# Patient Record
Sex: MALE | Race: WHITE | NOT HISPANIC OR LATINO | Employment: OTHER | URBAN - METROPOLITAN AREA
[De-identification: names, ages, dates, MRNs, and addresses within clinical notes are randomized per-mention and may not be internally consistent; named-entity substitution may affect disease eponyms.]

---

## 2019-08-16 ENCOUNTER — HISTORICAL (OUTPATIENT)
Dept: RADIOLOGY | Facility: HOSPITAL | Age: 64
End: 2019-08-16

## 2022-08-30 ENCOUNTER — HOSPITAL ENCOUNTER (INPATIENT)
Facility: HOSPITAL | Age: 67
LOS: 10 days | Discharge: HOME HEALTH | DRG: 231 | End: 2022-09-10
Attending: INTERNAL MEDICINE | Admitting: INTERNAL MEDICINE
Payer: MEDICARE

## 2022-08-30 ENCOUNTER — HOSPITAL ENCOUNTER (EMERGENCY)
Facility: HOSPITAL | Age: 67
Discharge: ANOTHER HOSPITAL | End: 2022-08-30
Attending: EMERGENCY MEDICINE
Payer: MEDICARE

## 2022-08-30 VITALS
SYSTOLIC BLOOD PRESSURE: 135 MMHG | WEIGHT: 150 LBS | DIASTOLIC BLOOD PRESSURE: 73 MMHG | RESPIRATION RATE: 15 BRPM | HEIGHT: 64 IN | HEART RATE: 83 BPM | TEMPERATURE: 98 F | OXYGEN SATURATION: 96 % | BODY MASS INDEX: 25.61 KG/M2

## 2022-08-30 DIAGNOSIS — I25.10 CORONARY ARTERY DISEASE: ICD-10-CM

## 2022-08-30 DIAGNOSIS — I21.4 TYPE 1 NON-ST ELEVATION MYOCARDIAL INFARCTION (NSTEMI): ICD-10-CM

## 2022-08-30 DIAGNOSIS — U07.1 COVID: ICD-10-CM

## 2022-08-30 DIAGNOSIS — R07.9 CHEST PAIN: ICD-10-CM

## 2022-08-30 DIAGNOSIS — I25.10 CAD (CORONARY ARTERY DISEASE): ICD-10-CM

## 2022-08-30 DIAGNOSIS — I20.89 STABLE ANGINA PECTORIS: Primary | ICD-10-CM

## 2022-08-30 DIAGNOSIS — I21.9 MI (MYOCARDIAL INFARCTION): ICD-10-CM

## 2022-08-30 DIAGNOSIS — I21.4 NSTEMI (NON-ST ELEVATED MYOCARDIAL INFARCTION): ICD-10-CM

## 2022-08-30 DIAGNOSIS — I21.4 NSTEMI, INITIAL EPISODE OF CARE: Primary | ICD-10-CM

## 2022-08-30 LAB
ALBUMIN SERPL-MCNC: 3.7 GM/DL (ref 3.4–4.8)
ALBUMIN/GLOB SERPL: 1.4 RATIO (ref 1.1–2)
ALP SERPL-CCNC: 51 UNIT/L (ref 40–150)
ALT SERPL-CCNC: 141 UNIT/L (ref 0–55)
AMPHET UR QL SCN: NEGATIVE
APPEARANCE UR: CLEAR
AST SERPL-CCNC: 112 UNIT/L (ref 5–34)
BACTERIA #/AREA URNS AUTO: ABNORMAL /HPF
BARBITURATE SCN PRESENT UR: NEGATIVE
BASOPHILS # BLD AUTO: 0.04 X10(3)/MCL (ref 0–0.2)
BASOPHILS NFR BLD AUTO: 0.6 %
BENZODIAZ UR QL SCN: NEGATIVE
BILIRUB UR QL STRIP.AUTO: NEGATIVE MG/DL
BILIRUBIN DIRECT+TOT PNL SERPL-MCNC: 0.4 MG/DL
BUN SERPL-MCNC: 13 MG/DL (ref 8.4–25.7)
CALCIUM SERPL-MCNC: 9.3 MG/DL (ref 8.8–10)
CANNABINOIDS UR QL SCN: POSITIVE
CHLORIDE SERPL-SCNC: 110 MMOL/L (ref 98–107)
CO2 SERPL-SCNC: 23 MMOL/L (ref 23–31)
COCAINE UR QL SCN: NEGATIVE
COLOR UR AUTO: YELLOW
CREAT SERPL-MCNC: 0.78 MG/DL (ref 0.73–1.18)
D DIMER PPP IA.FEU-MCNC: 0.44 UG/ML FEU (ref 0–0.5)
EOSINOPHIL # BLD AUTO: 0.07 X10(3)/MCL (ref 0–0.9)
EOSINOPHIL NFR BLD AUTO: 1 %
ERYTHROCYTE [DISTWIDTH] IN BLOOD BY AUTOMATED COUNT: 13.6 % (ref 11.5–17)
FENTANYL UR QL SCN: NEGATIVE
FLUAV AG UPPER RESP QL IA.RAPID: NOT DETECTED
FLUBV AG UPPER RESP QL IA.RAPID: NOT DETECTED
GFR SERPLBLD CREATININE-BSD FMLA CKD-EPI: >60 MLS/MIN/1.73/M2
GLOBULIN SER-MCNC: 2.6 GM/DL (ref 2.4–3.5)
GLUCOSE SERPL-MCNC: 111 MG/DL (ref 82–115)
GLUCOSE UR QL STRIP.AUTO: NEGATIVE MG/DL
HCT VFR BLD AUTO: 36.6 % (ref 42–52)
HGB BLD-MCNC: 11.8 GM/DL (ref 14–18)
IMM GRANULOCYTES # BLD AUTO: 0.02 X10(3)/MCL (ref 0–0.04)
IMM GRANULOCYTES NFR BLD AUTO: 0.3 %
KETONES UR QL STRIP.AUTO: NEGATIVE MG/DL
LEUKOCYTE ESTERASE UR QL STRIP.AUTO: NEGATIVE UNIT/L
LIPASE SERPL-CCNC: 125 U/L
LYMPHOCYTES # BLD AUTO: 1.14 X10(3)/MCL (ref 0.6–4.6)
LYMPHOCYTES NFR BLD AUTO: 15.9 %
MCH RBC QN AUTO: 29.2 PG (ref 27–31)
MCHC RBC AUTO-ENTMCNC: 32.2 MG/DL (ref 33–36)
MCV RBC AUTO: 90.6 FL (ref 80–94)
MDMA UR QL SCN: NEGATIVE
MONOCYTES # BLD AUTO: 0.66 X10(3)/MCL (ref 0.1–1.3)
MONOCYTES NFR BLD AUTO: 9.2 %
MUCOUS THREADS URNS QL MICRO: ABNORMAL /LPF
NEUTROPHILS # BLD AUTO: 5.2 X10(3)/MCL (ref 2.1–9.2)
NEUTROPHILS NFR BLD AUTO: 73 %
NITRITE UR QL STRIP.AUTO: NEGATIVE
OPIATES UR QL SCN: NEGATIVE
PCP UR QL: NEGATIVE
PH UR STRIP.AUTO: 5.5 [PH]
PH UR: 5.5 [PH] (ref 3–11)
PLATELET # BLD AUTO: 291 X10(3)/MCL (ref 130–400)
PMV BLD AUTO: 10.5 FL (ref 7.4–10.4)
POTASSIUM SERPL-SCNC: 4.4 MMOL/L (ref 3.5–5.1)
PROT SERPL-MCNC: 6.3 GM/DL (ref 5.8–7.6)
PROT UR QL STRIP.AUTO: 30 MG/DL
RBC # BLD AUTO: 4.04 X10(6)/MCL (ref 4.7–6.1)
RBC #/AREA URNS AUTO: ABNORMAL /HPF
RBC UR QL AUTO: NEGATIVE UNIT/L
SARS-COV-2 RNA RESP QL NAA+PROBE: DETECTED
SODIUM SERPL-SCNC: 142 MMOL/L (ref 136–145)
SP GR UR STRIP.AUTO: 1.02
SPECIFIC GRAVITY, URINE AUTO (.000) (OHS): 1.02 (ref 1–1.03)
SQUAMOUS #/AREA URNS AUTO: ABNORMAL /HPF
TROPONIN I SERPL-MCNC: 1.08 NG/ML (ref 0–0.04)
UROBILINOGEN UR STRIP-ACNC: 0.2 MG/DL
WBC # SPEC AUTO: 7.2 X10(3)/MCL (ref 4.5–11.5)
WBC #/AREA URNS AUTO: ABNORMAL /HPF

## 2022-08-30 PROCEDURE — 82728 ASSAY OF FERRITIN: CPT | Performed by: INTERNAL MEDICINE

## 2022-08-30 PROCEDURE — 36415 COLL VENOUS BLD VENIPUNCTURE: CPT | Performed by: INTERNAL MEDICINE

## 2022-08-30 PROCEDURE — 87389 HIV-1 AG W/HIV-1&-2 AB AG IA: CPT | Performed by: INTERNAL MEDICINE

## 2022-08-30 PROCEDURE — 93010 ELECTROCARDIOGRAM REPORT: CPT | Mod: ,,, | Performed by: INTERNAL MEDICINE

## 2022-08-30 PROCEDURE — G0379 DIRECT REFER HOSPITAL OBSERV: HCPCS

## 2022-08-30 PROCEDURE — 80307 DRUG TEST PRSMV CHEM ANLYZR: CPT | Performed by: NURSE PRACTITIONER

## 2022-08-30 PROCEDURE — 80061 LIPID PANEL: CPT | Performed by: INTERNAL MEDICINE

## 2022-08-30 PROCEDURE — 83880 ASSAY OF NATRIURETIC PEPTIDE: CPT | Performed by: INTERNAL MEDICINE

## 2022-08-30 PROCEDURE — 83036 HEMOGLOBIN GLYCOSYLATED A1C: CPT | Performed by: INTERNAL MEDICINE

## 2022-08-30 PROCEDURE — 84484 ASSAY OF TROPONIN QUANT: CPT | Performed by: NURSE PRACTITIONER

## 2022-08-30 PROCEDURE — G0378 HOSPITAL OBSERVATION PER HR: HCPCS

## 2022-08-30 PROCEDURE — 85379 FIBRIN DEGRADATION QUANT: CPT | Performed by: NURSE PRACTITIONER

## 2022-08-30 PROCEDURE — 87636 SARSCOV2 & INF A&B AMP PRB: CPT | Performed by: NURSE PRACTITIONER

## 2022-08-30 PROCEDURE — 93010 EKG 12-LEAD: ICD-10-PCS | Mod: ,,, | Performed by: INTERNAL MEDICINE

## 2022-08-30 PROCEDURE — 86141 C-REACTIVE PROTEIN HS: CPT | Performed by: INTERNAL MEDICINE

## 2022-08-30 PROCEDURE — 93005 ELECTROCARDIOGRAM TRACING: CPT

## 2022-08-30 PROCEDURE — 99291 CRITICAL CARE FIRST HOUR: CPT | Mod: 25

## 2022-08-30 PROCEDURE — 81001 URINALYSIS AUTO W/SCOPE: CPT | Performed by: NURSE PRACTITIONER

## 2022-08-30 PROCEDURE — 84484 ASSAY OF TROPONIN QUANT: CPT | Performed by: INTERNAL MEDICINE

## 2022-08-30 PROCEDURE — 80074 ACUTE HEPATITIS PANEL: CPT | Performed by: INTERNAL MEDICINE

## 2022-08-30 PROCEDURE — 85025 COMPLETE CBC W/AUTO DIFF WBC: CPT | Performed by: NURSE PRACTITIONER

## 2022-08-30 PROCEDURE — 83690 ASSAY OF LIPASE: CPT | Performed by: NURSE PRACTITIONER

## 2022-08-30 PROCEDURE — 25000003 PHARM REV CODE 250: Performed by: EMERGENCY MEDICINE

## 2022-08-30 PROCEDURE — 82550 ASSAY OF CK (CPK): CPT | Performed by: INTERNAL MEDICINE

## 2022-08-30 PROCEDURE — 36415 COLL VENOUS BLD VENIPUNCTURE: CPT | Performed by: NURSE PRACTITIONER

## 2022-08-30 PROCEDURE — 80053 COMPREHEN METABOLIC PANEL: CPT | Performed by: NURSE PRACTITIONER

## 2022-08-30 RX ORDER — NAPROXEN SODIUM 220 MG/1
81 TABLET, FILM COATED ORAL DAILY
Status: DISCONTINUED | OUTPATIENT
Start: 2022-08-31 | End: 2022-09-08

## 2022-08-30 RX ORDER — POLYETHYLENE GLYCOL 3350 17 G/17G
17 POWDER, FOR SOLUTION ORAL 2 TIMES DAILY PRN
Status: DISCONTINUED | OUTPATIENT
Start: 2022-08-31 | End: 2022-09-10 | Stop reason: HOSPADM

## 2022-08-30 RX ORDER — MAG HYDROX/ALUMINUM HYD/SIMETH 200-200-20
30 SUSPENSION, ORAL (FINAL DOSE FORM) ORAL 4 TIMES DAILY PRN
Status: DISCONTINUED | OUTPATIENT
Start: 2022-08-31 | End: 2022-09-10 | Stop reason: HOSPADM

## 2022-08-30 RX ORDER — NITROGLYCERIN 0.4 MG/1
0.4 TABLET SUBLINGUAL EVERY 5 MIN PRN
Status: DISCONTINUED | OUTPATIENT
Start: 2022-08-31 | End: 2022-09-10 | Stop reason: HOSPADM

## 2022-08-30 RX ORDER — AMOXICILLIN 250 MG
1 CAPSULE ORAL 2 TIMES DAILY PRN
Status: DISCONTINUED | OUTPATIENT
Start: 2022-08-31 | End: 2022-09-10 | Stop reason: HOSPADM

## 2022-08-30 RX ORDER — FAMOTIDINE 20 MG/1
20 TABLET, FILM COATED ORAL 2 TIMES DAILY
Status: DISCONTINUED | OUTPATIENT
Start: 2022-08-31 | End: 2022-09-10 | Stop reason: HOSPADM

## 2022-08-30 RX ORDER — NAPROXEN SODIUM 220 MG/1
324 TABLET, FILM COATED ORAL ONCE
Status: COMPLETED | OUTPATIENT
Start: 2022-08-30 | End: 2022-08-31

## 2022-08-30 RX ORDER — PROCHLORPERAZINE EDISYLATE 5 MG/ML
5 INJECTION INTRAMUSCULAR; INTRAVENOUS EVERY 6 HOURS PRN
Status: DISCONTINUED | OUTPATIENT
Start: 2022-08-31 | End: 2022-09-10 | Stop reason: HOSPADM

## 2022-08-30 RX ORDER — ENOXAPARIN SODIUM 100 MG/ML
1 INJECTION SUBCUTANEOUS EVERY 12 HOURS
Status: DISCONTINUED | OUTPATIENT
Start: 2022-08-30 | End: 2022-08-31

## 2022-08-30 RX ORDER — TALC
6 POWDER (GRAM) TOPICAL NIGHTLY PRN
Status: DISCONTINUED | OUTPATIENT
Start: 2022-08-31 | End: 2022-09-10 | Stop reason: HOSPADM

## 2022-08-30 RX ORDER — SODIUM CHLORIDE 0.9 % (FLUSH) 0.9 %
10 SYRINGE (ML) INJECTION
Status: DISCONTINUED | OUTPATIENT
Start: 2022-08-31 | End: 2022-09-10 | Stop reason: HOSPADM

## 2022-08-30 RX ORDER — ACETAMINOPHEN 500 MG
1000 TABLET ORAL EVERY 6 HOURS PRN
Status: DISCONTINUED | OUTPATIENT
Start: 2022-08-31 | End: 2022-09-10 | Stop reason: HOSPADM

## 2022-08-30 RX ORDER — BENZONATATE 100 MG/1
200 CAPSULE ORAL 3 TIMES DAILY PRN
Status: DISCONTINUED | OUTPATIENT
Start: 2022-08-31 | End: 2022-09-10 | Stop reason: HOSPADM

## 2022-08-30 RX ORDER — ONDANSETRON 2 MG/ML
4 INJECTION INTRAMUSCULAR; INTRAVENOUS EVERY 4 HOURS PRN
Status: DISCONTINUED | OUTPATIENT
Start: 2022-08-31 | End: 2022-09-10 | Stop reason: HOSPADM

## 2022-08-30 RX ORDER — SIMETHICONE 80 MG
1 TABLET,CHEWABLE ORAL 4 TIMES DAILY PRN
Status: DISCONTINUED | OUTPATIENT
Start: 2022-08-31 | End: 2022-09-10 | Stop reason: HOSPADM

## 2022-08-30 RX ORDER — ACETAMINOPHEN 325 MG/1
650 TABLET ORAL EVERY 4 HOURS PRN
Status: DISCONTINUED | OUTPATIENT
Start: 2022-08-31 | End: 2022-09-10 | Stop reason: HOSPADM

## 2022-08-30 RX ORDER — GUAIFENESIN/DEXTROMETHORPHAN 100-10MG/5
5 SYRUP ORAL EVERY 4 HOURS PRN
Status: DISCONTINUED | OUTPATIENT
Start: 2022-08-30 | End: 2022-09-10 | Stop reason: HOSPADM

## 2022-08-30 RX ADMIN — NITROGLYCERIN 1 INCH: 20 OINTMENT TOPICAL at 02:08

## 2022-08-30 NOTE — ED PROVIDER NOTES
"Encounter Date: 8/30/2022       History     Chief Complaint   Patient presents with    Chest Pain     Brought in per [facility] with reports of chest pain x1 hour with diaphoresis. Pt stated he felt a "weird feeling of a sphere of pain around him and his head." Pain during triage is 2/10.      Please see mid level's note same day same visit    Woke up with chest pain at about 10 and 10 30 today actually denies it being chest pain he describes it as a  "Sphere " of breath fever chills denies SOBnausea vomiting denies radiation of any pain of pain around him associated with diaphoresis general malaise.  Denies shortness of breath     Denies ever having this pain before denies any pain with exertion    Review of patient's allergies indicates:  No Known Allergies  No past medical history on file.  No past surgical history on file.  No family history on file.     Review of Systems   Constitutional:  Positive for diaphoresis. Negative for fever.   HENT:  Negative for sore throat.    Respiratory:  Positive for chest tightness. Negative for shortness of breath.    Cardiovascular:  Negative for chest pain.   Gastrointestinal:  Negative for nausea and vomiting.   Genitourinary:  Negative for dysuria.   Musculoskeletal:  Negative for back pain.   Skin:  Negative for rash.   Neurological:  Negative for weakness.   Hematological:  Does not bruise/bleed easily.     Physical Exam     Initial Vitals [08/30/22 1211]   BP Pulse Resp Temp SpO2   114/77 83 18 98.2 °F (36.8 °C) 95 %      MAP       --         Physical Exam    Nursing note and vitals reviewed.  Constitutional: He appears well-developed and well-nourished.   HENT:   Head: Normocephalic and atraumatic.   Right Ear: Tympanic membrane and external ear normal.   Left Ear: Tympanic membrane and external ear normal.   Nose: Nose normal.   Mouth/Throat: Oropharynx is clear and moist and mucous membranes are normal. Oral lesions: moist muc memb.   Eyes: Conjunctivae and EOM are normal. " Pupils are equal, round, and reactive to light.   Neck: Neck supple. No thyromegaly present. No tracheal deviation present. No JVD present.   Normal range of motion.  Cardiovascular:  Normal rate, regular rhythm, normal heart sounds and intact distal pulses.     Exam reveals no gallop and no friction rub.       No murmur heard.  Pulmonary/Chest: Breath sounds normal. No stridor. No respiratory distress. He has no wheezes. He has no rhonchi. He has no rales. He exhibits no tenderness.   Abdominal: Abdomen is soft. Bowel sounds are normal. He exhibits no distension and no mass. No signs of injury. There is no abdominal tenderness.   Musculoskeletal:         General: No tenderness or edema. Normal range of motion.      Cervical back: Normal range of motion and neck supple.     Lymphadenopathy:     He has no cervical adenopathy.   Neurological: He is alert and oriented to person, place, and time. He has normal strength. No cranial nerve deficit or sensory deficit. GCS score is 15. GCS eye subscore is 4. GCS verbal subscore is 5. GCS motor subscore is 6.   Skin: Skin is warm and dry. Capillary refill takes 2 to 3 seconds. No rash noted.   Psychiatric: He has a normal mood and affect. His behavior is normal. Judgment and thought content normal.       ED Course   Critical Care    Date/Time: 8/30/2022 2:57 PM  Performed by: Kemar Burns MD  Authorized by: Kemar Burns MD   Direct patient critical care time: 15 minutes  Ordering / reviewing critical care time: 5 minutes  Documentation critical care time: 5 minutes  Consulting other physicians critical care time: 8 minutes  Total critical care time (exclusive of procedural time) : 33 minutes  Critical care time was exclusive of separately billable procedures and treating other patients.  Critical care was necessary to treat or prevent imminent or life-threatening deterioration of the following conditions: cardiac failure.  Comments: I did my attestation.  After the  midlevel had appropriately seen and evaluated the patient.  I of course was in charge of the patient the entire time and had substanive input into his treatment plan and consult .  I did  obtain the tele-cardiology consult      Labs Reviewed   COMPREHENSIVE METABOLIC PANEL - Abnormal; Notable for the following components:       Result Value    Chloride 110 (*)     Alanine Aminotransferase 141 (*)     Aspartate Aminotransferase 112 (*)     All other components within normal limits   COVID/FLU A&B PCR - Abnormal; Notable for the following components:    SARS-CoV-2 PCR Detected (*)     All other components within normal limits   LIPASE - Abnormal; Notable for the following components:    Lipase Level 125 (*)     All other components within normal limits   DRUG SCREEN, URINE (BEAKER) - Abnormal; Notable for the following components:    Cannabinoids, Urine Positive (*)     All other components within normal limits    Narrative:     Cut off concentrations:    Amphetamines - 1000 ng/ml  Barbiturates - 200 ng/ml  Benzodiazepine - 200 ng/ml  Cannabinoids (THC) - 50 ng/ml  Cocaine - 300 ng/ml  Fentanyl - 1.0 ng/ml  MDMA - 500 ng/ml  Opiates - 300 ng/ml   Phencyclidine (PCP) - 25 ng/ml    Specimen submitted for drug analysis and tested for pH and specific gravity in order to evaluate sample integrity. Suspect tampering if specific gravity is <1.003 and/or pH is not within the range of 4.5 - 8.0  False negatives may result form substances such as bleach added to urine.  False positives may result for the presence of a substance with similar chemical structure to the drug or its metabolite.    This test provides only a PRELIMINARY analytical test result. A more specific alternate chemical method must be used in order to obtain a confirmed analytical result. Gas chromatography/mass spectrometry (GC/MS) is the preferred confirmatory method. Other chemical confirmation methods are available. Clinical consideration and professional  judgement should be applied to any drug of abuse test result, particularly when preliminary positive results are used.    Positive results will be confirmed only at the physicians request. Unconfirmed screening results are to be used only for medical purposes (treatment).        TROPONIN I - Abnormal; Notable for the following components:    Troponin-I 1.078 (*)     All other components within normal limits   URINALYSIS, REFLEX TO URINE CULTURE - Abnormal; Notable for the following components:    Protein, UA 30 (*)     All other components within normal limits   CBC WITH DIFFERENTIAL - Abnormal; Notable for the following components:    RBC 4.04 (*)     Hgb 11.8 (*)     Hct 36.6 (*)     MCHC 32.2 (*)     MPV 10.5 (*)     All other components within normal limits   URINALYSIS, MICROSCOPIC - Abnormal; Notable for the following components:    Mucous, UA Small (*)     All other components within normal limits   D DIMER, QUANTITATIVE - Normal   CBC W/ AUTO DIFFERENTIAL    Narrative:     The following orders were created for panel order CBC auto differential.  Procedure                               Abnormality         Status                     ---------                               -----------         ------                     CBC with Differential[[id number]]        Abnormal            Final result                 Please view results for these tests on the individual orders.     EKG Readings: (Independently Interpreted)   Initial Reading: Ischemia. Heart Rate: 79. Ectopy: No Ectopy. T Waves Flipped: I, AVL, V2, V3, V4 and V5.   No old EKGs in the systems patient EKG was done at 1:11 p.m. 79 per normal sinus rhythm with marked ST segment inversion.  Leads 1 and avl also in V 2 V3 V4 V5.  With flattening of the T-waves no acute ST segment elevation appreciated   ECG Results              EKG 12-lead (In process)  Result time 08/30/22 14:06:58      In process by Interface, Lab In [facility] (08/30/22 14:06:58)               "     Narrative:    Test Reason : R07.9,    Vent. Rate : 079 BPM     Atrial Rate : 079 BPM     P-R Int : 202 ms          QRS Dur : 080 ms      QT Int : 402 ms       P-R-T Axes : 068 083 131 degrees     QTc Int : 460 ms    Normal sinus rhythm  ST and T wave abnormality, consider anterolateral ischemia  Prolonged QT  Abnormal ECG  No previous ECGs available    Referred By: [email or web address]   SELF           Confirmed By:                                   Imaging Results              X-Ray Chest PA And Lateral (Final result)  Result time 08/30/22 14:36:08      Final result by Kemar Palm MD (08/30/22 14:36:08)                   Impression:      1. No active cardiopulmonary disease identified      Electronically signed by: Kemar Palm  Date:    08/30/2022  Time:    14:36               Narrative:    EXAMINATION:  XR CHEST PA AND LATERAL    CLINICAL HISTORY:  , Chest pain, unspecified.    COMPARISON:  06/11/2019    FINDINGS:  PA/AP and lateral views reveal the heart to be normal in size.  The trachea is midline.  No definite infiltrate or effusion is seen.  Degenerative changes are noted to the thoracic spine.  Central pulmonary vasculature is mildly prominent.                                       Medications   nitroGLYCERIN 2% TD oint ointment 1 inch (1 inch Transdermal Given 8/30/22 2574)                Attending Attestation:   Physician Attestation Statement for Resident:  As the supervising MD   Physician Attestation Statement: I have personally seen and examined this patient.   I agree with the above history.  -: This gentleman comes to the hospital about  this morning he woke up that is his normal routine to get up at that time.  And when he did he had a "Sphere" of pain he indicates around his chest he did not feel good with that he was slightly short of breath no nausea no vomiting no radiation to arms or legs did have diaphoresis however.  He received sublingual nitroglycerin and aspirin.  Two sprays of " nitro that and he says seemed he arrived here he was pain free not having shortness of breath diaphoresis with resolved.  And he had no more SPHERE of pain around him.  He has never had any cardiac issues.  He denies that he had pain in his chest just the sphere of pain around him.    Cardiac risk factors include age smoked until  does not drink does smoke marijuana.  Does not use other drugs.  Has no family history of myocardial infarctions mother or father they both  not from heart attacks.  Normally can walk do his normal daily activities without any chest pain    We have no old EKGs here.   As the supervising MD I agree with the above PE.   -: EKG was done.  EKG was done at 1:11 p.m..  It shows a sinus rhythm with ST segment changes basically there is T-wave inversion in leads 1 aVL.  There is also of inversion of the T-waves throughout leads V2 V3 V4 V5 and flattening of the T-wave in V6.  Very pleasant gentleman awake oriented not toxic normocephalic atraumatic heart is regular rate and rhythm without murmur gallop or rub lungs are clear to auscultation and percussion abdomen is quite benign without masses neurological cranial nerves are intact speech is clear and goal oriented.   As the supervising MD I agree with the above treatment, course, plan, and disposition.   -: The patient does have elevation of his troponin.  Patient has cardiac risk factors.  Patient's pain was relieved with nitroglycerin.  Tele cardiology consult is being obtained.  He is currently pain-free  And says that he feels good at this point.  My working impression is a non ST segment elevation myocardial infarction.    Patient is currently without any healthcare he does not go to clinics or see any body part he does not have a history of diabetes hypertension or prior cardiac issues.    I have had a substansive  input and this patient's diagnosis and care.  Going to empirically place nitro paste on him.  There were going to  await cardiology consult   I have reviewed and agree with the residents interpretation of the following: lab data, x-rays and EKG.               ED Course as of 08/30/22 1755   Tue Aug 30, 2022   1521 Dr SCOT Trevino tele-card consult  said patient should be transferred to facility that can do a angiogram  [DM]   1608 I received a call from the nurse practitioner at our [facility] patient has been accepted to be a direct admit to telemetry on the floor.  I gave her report patient still pain free vital signs are stable and patient is prepared for release he will be transferred by an ACLS unit. [DM]   1603 Regan Rose NP  [DM]      ED Course User Index  [DM] Kemar Burns MD               Clinical Impression:   Final diagnoses:  [R07.9] Chest pain  [I21.4] NSTEMI, initial episode of care (Primary)  [U07.1] COVID        ED Disposition Condition    Transfer to Another Facility Stable                Kemar Burns MD  08/30/22 1459       Kemar Burns MD  08/30/22 1505       Kemar Burns MD  08/30/22 1524       Kemar Burns MD  08/30/22 1755

## 2022-08-30 NOTE — Clinical Note
The catheter was inserted into the circumflex. An angiography was performed of the left coronary arteries.

## 2022-08-30 NOTE — Clinical Note
The radial band was applied to the right radial artery. 12 cc's of air were inserted into the closure device.

## 2022-08-30 NOTE — Clinical Note
The catheter was repositioned into the ostium   left main. An angiography was performed of the left coronary arteries. Multiple views were taken.

## 2022-08-30 NOTE — ED PROVIDER NOTES
"Encounter Date: 8/30/2022       History     Chief Complaint   Patient presents with    Chest Pain     Brought in per [facility] with reports of chest pain x1 hour with diaphoresis. Pt stated he felt a "weird feeling of a sphere of pain around him and his head." Pain during triage is 2/10.      Patient is a 66-year-old  male with complaints of pain to the head chest and shoulders.  Patient states this started approximately 1 hour prior to EKG in coming to his home.  He states that it was a feeling of tightness that surround his head chest and shoulders and he states that he was very clammy and sweating at the time of the chest pain.  Patient denies any shortness of breath.  Patient denies any past medical history and states the last time he saw his doctor he was told he was a borderline diabetic.  Patient states he does smoke marijuana but denies any other substances including tobacco alcohol.    Review of patient's allergies indicates:  No Known Allergies  No past medical history on file.  No past surgical history on file.  No family history on file.     Review of Systems   Constitutional:  Positive for diaphoresis. Negative for chills and fever.   HENT: Negative.  Negative for congestion and postnasal drip.    Eyes: Negative.  Negative for redness and itching.   Respiratory:  Positive for chest tightness. Negative for cough and shortness of breath.    Cardiovascular: Negative.  Negative for chest pain and leg swelling.   Gastrointestinal: Negative.  Negative for abdominal distention, abdominal pain, nausea and vomiting.   Endocrine: Negative.  Negative for polyuria.   Genitourinary: Negative.  Negative for difficulty urinating, dysuria and hematuria.   Musculoskeletal: Negative.  Negative for back pain.   Skin: Negative.  Negative for pallor and rash.   Allergic/Immunologic: Negative.  Negative for environmental allergies and immunocompromised state.   Neurological:  Positive for dizziness. Negative for " light-headedness and headaches.        Reports near syncope with episodes of dizziness.   Hematological: Negative.  Negative for adenopathy. Does not bruise/bleed easily.   Psychiatric/Behavioral: Negative.  Negative for agitation, behavioral problems and confusion.      Physical Exam     Initial Vitals [08/30/22 1211]   BP Pulse Resp Temp SpO2   114/77 83 18 98.2 °F (36.8 °C) 95 %      MAP       --         Physical Exam    Nursing note and vitals reviewed.  Constitutional: Vital signs are normal. He appears well-developed and well-nourished.  Non-toxic appearance. He does not have a sickly appearance.   HENT:   Head: Normocephalic and atraumatic.   Eyes: Conjunctivae, EOM and lids are normal. Lids are everted and swept, no foreign bodies found.   Neck: Trachea normal and phonation normal. Neck supple. No thyroid mass and no thyromegaly present.   Normal range of motion.   Full passive range of motion without pain.     Cardiovascular:  Normal rate, regular rhythm, S1 normal, S2 normal and normal pulses.           Slightly diminished on auscultation.   Pulmonary/Chest: No respiratory distress. He has no wheezes. He has no rhonchi. He has no rales. He exhibits no tenderness.   Musculoskeletal:      Cervical back: Full passive range of motion without pain, normal range of motion and neck supple.     Lymphadenopathy:     He has no cervical adenopathy.   Neurological: He is alert and oriented to person, place, and time. He has normal strength and normal reflexes.   Skin: Skin is warm, dry and intact. Capillary refill takes less than 2 seconds.   Psychiatric: He has a normal mood and affect. His speech is normal and behavior is normal. Judgment normal. Cognition and memory are normal.       ED Course   Procedures  Labs Reviewed - No data to display       Imaging Results    None          Medications - No data to display  Medical Decision Making:   Initial Assessment:   Patient does have history of what he describes as  prediabetes but no other past medical history.  His chest pain is moderately improved after nitroglycerin per [place] an ambulance.  He also received 325 aspirin in route.  He received nitroglycerin sprays x2.  Will begin cardiac workup to include urine, blood work, chest x-ray with EKG.  Will develop further plan of care once his results have been obtained.                    Clinical Impression:   Final diagnoses:  [R07.9] Chest pain               Dajuan Chin, [facility]  08/30/22 1445       Dajuan Chin, [facility]  08/30/22 1447

## 2022-08-31 LAB
ALBUMIN SERPL-MCNC: 3.8 GM/DL (ref 3.4–4.8)
ALBUMIN/GLOB SERPL: 1.7 RATIO (ref 1.1–2)
ALP SERPL-CCNC: 50 UNIT/L (ref 40–150)
ALT SERPL-CCNC: 104 UNIT/L (ref 0–55)
APTT PPP: 34.8 SECONDS (ref 23.2–33.7)
AST SERPL-CCNC: 64 UNIT/L (ref 5–34)
AV INDEX (PROSTH): 0.54
AV MEAN GRADIENT: 4 MMHG
AV PEAK GRADIENT: 8 MMHG
AV VALVE AREA: 1.7 CM2
AV VELOCITY RATIO: 0.53
BASOPHILS # BLD AUTO: 0.05 X10(3)/MCL (ref 0–0.2)
BASOPHILS NFR BLD AUTO: 0.6 %
BILIRUBIN DIRECT+TOT PNL SERPL-MCNC: 0.3 MG/DL
BNP BLD-MCNC: 191.8 PG/ML
BSA FOR ECHO PROCEDURE: 1.75 M2
BUN SERPL-MCNC: 11.2 MG/DL (ref 8.4–25.7)
CALCIUM SERPL-MCNC: 9.3 MG/DL (ref 8.8–10)
CHLORIDE SERPL-SCNC: 110 MMOL/L (ref 98–107)
CHOLEST SERPL-MCNC: 180 MG/DL
CHOLEST/HDLC SERPL: 4 {RATIO} (ref 0–5)
CK SERPL-CCNC: 243 U/L (ref 30–200)
CO2 SERPL-SCNC: 21 MMOL/L (ref 23–31)
CREAT SERPL-MCNC: 0.77 MG/DL (ref 0.73–1.18)
CRP SERPL HS-MCNC: 2.19 MG/L
CV ECHO LV RWT: 0.44 CM
DOP CALC AO PEAK VEL: 1.37 M/S
DOP CALC AO VTI: 25 CM
DOP CALC LVOT AREA: 3.1 CM2
DOP CALC LVOT DIAMETER: 2 CM
DOP CALC LVOT PEAK VEL: 0.73 M/S
DOP CALC LVOT STROKE VOLUME: 42.39 CM3
DOP CALC MV VTI: 20.7 CM
DOP CALCLVOT PEAK VEL VTI: 13.5 CM
E WAVE DECELERATION TIME: 201 MSEC
E/A RATIO: 0.72
E/E' RATIO: 10.67 M/S
ECHO LV POSTERIOR WALL: 1.04 CM (ref 0.6–1.1)
EJECTION FRACTION: 42 %
EOSINOPHIL # BLD AUTO: 0.19 X10(3)/MCL (ref 0–0.9)
EOSINOPHIL NFR BLD AUTO: 2.2 %
ERYTHROCYTE [DISTWIDTH] IN BLOOD BY AUTOMATED COUNT: 13.8 % (ref 11.5–17)
EST. AVERAGE GLUCOSE BLD GHB EST-MCNC: 122.6 MG/DL
FERRITIN SERPL-MCNC: 69.62 NG/ML (ref 21.81–274.66)
FRACTIONAL SHORTENING: 25 % (ref 28–44)
GFR SERPLBLD CREATININE-BSD FMLA CKD-EPI: >60 MLS/MIN/1.73/M2
GLOBULIN SER-MCNC: 2.2 GM/DL (ref 2.4–3.5)
GLUCOSE SERPL-MCNC: 95 MG/DL (ref 82–115)
HAV IGM SERPL QL IA: NONREACTIVE
HBA1C MFR BLD: 5.9 %
HBV CORE IGM SERPL QL IA: NONREACTIVE
HBV SURFACE AG SERPL QL IA: NONREACTIVE
HCT VFR BLD AUTO: 39.3 % (ref 42–52)
HCV AB SERPL QL IA: NONREACTIVE
HDLC SERPL-MCNC: 41 MG/DL (ref 35–60)
HGB BLD-MCNC: 12.5 GM/DL (ref 14–18)
HIV 1+2 AB+HIV1 P24 AG SERPL QL IA: NONREACTIVE
IMM GRANULOCYTES # BLD AUTO: 0.02 X10(3)/MCL (ref 0–0.04)
IMM GRANULOCYTES NFR BLD AUTO: 0.2 %
INR BLD: 1.07 (ref 0–1.3)
INTERVENTRICULAR SEPTUM: 0.86 CM (ref 0.6–1.1)
LDLC SERPL CALC-MCNC: 108 MG/DL (ref 50–140)
LEFT ATRIUM SIZE: 3.5 CM
LEFT ATRIUM VOLUME INDEX MOD: 19.2 ML/M2
LEFT ATRIUM VOLUME MOD: 33.2 CM3
LEFT INTERNAL DIMENSION IN SYSTOLE: 3.55 CM (ref 2.1–4)
LEFT VENTRICLE DIASTOLIC VOLUME INDEX: 60.12 ML/M2
LEFT VENTRICLE DIASTOLIC VOLUME: 104 ML
LEFT VENTRICLE MASS INDEX: 90 G/M2
LEFT VENTRICLE SYSTOLIC VOLUME INDEX: 30.4 ML/M2
LEFT VENTRICLE SYSTOLIC VOLUME: 52.6 ML
LEFT VENTRICULAR INTERNAL DIMENSION IN DIASTOLE: 4.74 CM (ref 3.5–6)
LEFT VENTRICULAR MASS: 155.57 G
LV LATERAL E/E' RATIO: 10.67 M/S
LV SEPTAL E/E' RATIO: 10.67 M/S
LVOT MG: 1 MMHG
LVOT MV: 0.47 CM/S
LYMPHOCYTES # BLD AUTO: 2.32 X10(3)/MCL (ref 0.6–4.6)
LYMPHOCYTES NFR BLD AUTO: 26.7 %
MAGNESIUM SERPL-MCNC: 2.2 MG/DL (ref 1.6–2.6)
MCH RBC QN AUTO: 29.4 PG (ref 27–31)
MCHC RBC AUTO-ENTMCNC: 31.8 MG/DL (ref 33–36)
MCV RBC AUTO: 92.5 FL (ref 80–94)
MONOCYTES # BLD AUTO: 0.72 X10(3)/MCL (ref 0.1–1.3)
MONOCYTES NFR BLD AUTO: 8.3 %
MV MEAN GRADIENT: 1 MMHG
MV PEAK A VEL: 0.89 M/S
MV PEAK E VEL: 0.64 M/S
MV PEAK GRADIENT: 4 MMHG
MV STENOSIS PRESSURE HALF TIME: 64 MS
MV VALVE AREA BY CONTINUITY EQUATION: 2.05 CM2
MV VALVE AREA P 1/2 METHOD: 3.44 CM2
NEUTROPHILS # BLD AUTO: 5.4 X10(3)/MCL (ref 2.1–9.2)
NEUTROPHILS NFR BLD AUTO: 62 %
NRBC BLD AUTO-RTO: 0 %
PHOSPHATE SERPL-MCNC: 3.8 MG/DL (ref 2.3–4.7)
PISA TR MAX VEL: 1.23 M/S
PLATELET # BLD AUTO: 313 X10(3)/MCL (ref 130–400)
PMV BLD AUTO: 11.5 FL (ref 7.4–10.4)
POTASSIUM SERPL-SCNC: 4.2 MMOL/L (ref 3.5–5.1)
PROT SERPL-MCNC: 6 GM/DL (ref 5.8–7.6)
PROTHROMBIN TIME: 13.8 SECONDS (ref 12.5–14.5)
RA PRESSURE: 8 MMHG
RBC # BLD AUTO: 4.25 X10(6)/MCL (ref 4.7–6.1)
RIGHT VENTRICULAR END-DIASTOLIC DIMENSION: 3.25 CM
SODIUM SERPL-SCNC: 140 MMOL/L (ref 136–145)
TDI LATERAL: 0.06 M/S
TDI SEPTAL: 0.06 M/S
TDI: 0.06 M/S
TR MAX PG: 6 MMHG
TRICUSPID ANNULAR PLANE SYSTOLIC EXCURSION: 1.61 CM
TRIGL SERPL-MCNC: 156 MG/DL (ref 34–140)
TROPONIN I SERPL-MCNC: 2.87 NG/ML (ref 0–0.04)
TROPONIN I SERPL-MCNC: 3.87 NG/ML (ref 0–0.04)
TROPONIN I SERPL-MCNC: 4 NG/ML (ref 0–0.04)
TV REST PULMONARY ARTERY PRESSURE: 14 MMHG
VLDLC SERPL CALC-MCNC: 31 MG/DL
WBC # SPEC AUTO: 8.7 X10(3)/MCL (ref 4.5–11.5)

## 2022-08-31 PROCEDURE — C1769 GUIDE WIRE: HCPCS | Performed by: INTERNAL MEDICINE

## 2022-08-31 PROCEDURE — C1894 INTRO/SHEATH, NON-LASER: HCPCS | Performed by: INTERNAL MEDICINE

## 2022-08-31 PROCEDURE — 99153 MOD SED SAME PHYS/QHP EA: CPT | Performed by: INTERNAL MEDICINE

## 2022-08-31 PROCEDURE — 27201423 OPTIME MED/SURG SUP & DEVICES STERILE SUPPLY: Performed by: INTERNAL MEDICINE

## 2022-08-31 PROCEDURE — C1725 CATH, TRANSLUMIN NON-LASER: HCPCS | Performed by: INTERNAL MEDICINE

## 2022-08-31 PROCEDURE — 80053 COMPREHEN METABOLIC PANEL: CPT | Performed by: INTERNAL MEDICINE

## 2022-08-31 PROCEDURE — 84484 ASSAY OF TROPONIN QUANT: CPT | Performed by: INTERNAL MEDICINE

## 2022-08-31 PROCEDURE — 93458 L HRT ARTERY/VENTRICLE ANGIO: CPT | Mod: XU | Performed by: INTERNAL MEDICINE

## 2022-08-31 PROCEDURE — 84100 ASSAY OF PHOSPHORUS: CPT | Performed by: INTERNAL MEDICINE

## 2022-08-31 PROCEDURE — 25000003 PHARM REV CODE 250: Performed by: INTERNAL MEDICINE

## 2022-08-31 PROCEDURE — 21400001 HC TELEMETRY ROOM

## 2022-08-31 PROCEDURE — 83735 ASSAY OF MAGNESIUM: CPT | Performed by: INTERNAL MEDICINE

## 2022-08-31 PROCEDURE — 85730 THROMBOPLASTIN TIME PARTIAL: CPT

## 2022-08-31 PROCEDURE — 92920 PRQ TRLUML C ANGIOP 1ART&/BR: CPT | Mod: LD | Performed by: INTERNAL MEDICINE

## 2022-08-31 PROCEDURE — 25500020 PHARM REV CODE 255: Performed by: INTERNAL MEDICINE

## 2022-08-31 PROCEDURE — 96372 THER/PROPH/DIAG INJ SC/IM: CPT | Performed by: INTERNAL MEDICINE

## 2022-08-31 PROCEDURE — 11000001 HC ACUTE MED/SURG PRIVATE ROOM

## 2022-08-31 PROCEDURE — 85610 PROTHROMBIN TIME: CPT

## 2022-08-31 PROCEDURE — 99152 MOD SED SAME PHYS/QHP 5/>YRS: CPT | Performed by: INTERNAL MEDICINE

## 2022-08-31 PROCEDURE — 85025 COMPLETE CBC W/AUTO DIFF WBC: CPT | Performed by: INTERNAL MEDICINE

## 2022-08-31 PROCEDURE — C1887 CATHETER, GUIDING: HCPCS | Performed by: INTERNAL MEDICINE

## 2022-08-31 PROCEDURE — 93571 IV DOP VEL&/PRESS C FLO 1ST: CPT | Mod: 74 | Performed by: INTERNAL MEDICINE

## 2022-08-31 PROCEDURE — 27000207 HC ISOLATION

## 2022-08-31 PROCEDURE — 36415 COLL VENOUS BLD VENIPUNCTURE: CPT | Performed by: INTERNAL MEDICINE

## 2022-08-31 PROCEDURE — 63600175 PHARM REV CODE 636 W HCPCS: Performed by: INTERNAL MEDICINE

## 2022-08-31 PROCEDURE — 63600175 PHARM REV CODE 636 W HCPCS

## 2022-08-31 RX ORDER — HEPARIN SODIUM 1000 [USP'U]/ML
INJECTION, SOLUTION INTRAVENOUS; SUBCUTANEOUS
Status: DISCONTINUED | OUTPATIENT
Start: 2022-08-31 | End: 2022-09-05

## 2022-08-31 RX ORDER — LIDOCAINE HYDROCHLORIDE 10 MG/ML
INJECTION INFILTRATION; PERINEURAL
Status: DISCONTINUED | OUTPATIENT
Start: 2022-08-31 | End: 2022-09-10 | Stop reason: HOSPADM

## 2022-08-31 RX ORDER — VERAPAMIL HYDROCHLORIDE 2.5 MG/ML
INJECTION, SOLUTION INTRAVENOUS
Status: DISCONTINUED | OUTPATIENT
Start: 2022-08-31 | End: 2022-09-10 | Stop reason: HOSPADM

## 2022-08-31 RX ORDER — MIDAZOLAM HYDROCHLORIDE 1 MG/ML
INJECTION INTRAMUSCULAR; INTRAVENOUS
Status: DISCONTINUED | OUTPATIENT
Start: 2022-08-31 | End: 2022-09-06

## 2022-08-31 RX ORDER — HEPARIN SODIUM,PORCINE/D5W 25000/250
0-40 INTRAVENOUS SOLUTION INTRAVENOUS CONTINUOUS
Status: DISCONTINUED | OUTPATIENT
Start: 2022-08-31 | End: 2022-09-05

## 2022-08-31 RX ORDER — FENTANYL CITRATE 50 UG/ML
INJECTION, SOLUTION INTRAMUSCULAR; INTRAVENOUS
Status: DISCONTINUED | OUTPATIENT
Start: 2022-08-31 | End: 2022-09-06

## 2022-08-31 RX ORDER — SODIUM CHLORIDE 0.9 % (FLUSH) 0.9 %
10 SYRINGE (ML) INJECTION
Status: DISCONTINUED | OUTPATIENT
Start: 2022-08-31 | End: 2022-09-10 | Stop reason: HOSPADM

## 2022-08-31 RX ORDER — SODIUM CHLORIDE 9 MG/ML
INJECTION, SOLUTION INTRAVENOUS CONTINUOUS
Status: ACTIVE | OUTPATIENT
Start: 2022-08-31 | End: 2022-09-01

## 2022-08-31 RX ORDER — NITROGLYCERIN 5 MG/ML
INJECTION, SOLUTION INTRAVENOUS
Status: DISCONTINUED | OUTPATIENT
Start: 2022-08-31 | End: 2022-09-10 | Stop reason: HOSPADM

## 2022-08-31 RX ORDER — ACETAMINOPHEN 325 MG/1
650 TABLET ORAL EVERY 4 HOURS PRN
Status: DISCONTINUED | OUTPATIENT
Start: 2022-08-31 | End: 2022-09-10 | Stop reason: HOSPADM

## 2022-08-31 RX ORDER — ONDANSETRON 4 MG/1
8 TABLET, ORALLY DISINTEGRATING ORAL EVERY 8 HOURS PRN
Status: DISCONTINUED | OUTPATIENT
Start: 2022-08-31 | End: 2022-09-10 | Stop reason: HOSPADM

## 2022-08-31 RX ADMIN — ACETAMINOPHEN 1000 MG: 500 TABLET, FILM COATED ORAL at 07:08

## 2022-08-31 RX ADMIN — TICAGRELOR 180 MG: 90 TABLET ORAL at 02:08

## 2022-08-31 RX ADMIN — HEPARIN SODIUM 12 UNITS/KG/HR: 10000 INJECTION, SOLUTION INTRAVENOUS at 08:08

## 2022-08-31 RX ADMIN — METOPROLOL SUCCINATE 12.5 MG: 25 TABLET, EXTENDED RELEASE ORAL at 12:08

## 2022-08-31 RX ADMIN — Medication 81 MG: at 08:08

## 2022-08-31 RX ADMIN — FAMOTIDINE 20 MG: 20 TABLET, FILM COATED ORAL at 08:08

## 2022-08-31 RX ADMIN — ENOXAPARIN SODIUM 70 MG: 80 INJECTION SUBCUTANEOUS at 12:08

## 2022-08-31 RX ADMIN — ASPIRIN 81 MG CHEWABLE TABLET 324 MG: 81 TABLET CHEWABLE at 12:08

## 2022-08-31 RX ADMIN — SODIUM CHLORIDE: 9 INJECTION, SOLUTION INTRAVENOUS at 05:08

## 2022-08-31 RX ADMIN — METOPROLOL SUCCINATE 12.5 MG: 25 TABLET, EXTENDED RELEASE ORAL at 08:08

## 2022-08-31 RX ADMIN — TICAGRELOR 90 MG: 90 TABLET ORAL at 08:08

## 2022-08-31 NOTE — CARE UPDATE
Troponin trending up  1.078 to  2.866. Repeat EKG with dynamic changes  compared to prior with diffuse T-wave inversions except an AVR and early Q wave in lead II.     He remained chest pain-free.     Received full-dose aspirin and enoxaparin 1mg/kg and beta-blocker earlier     Will loaded with ticagrelor 180 mg.     Notify Cardiology urgently

## 2022-08-31 NOTE — H&P
[facility]  Hospital Medicine   History & Physical Note      Patient Name: Gustavo Cheung  : 1955  MRN: [id number]  PCP: No primary care provider on file.  Admitting Service: Hospital Medicine  Attending Physician: Yomi Milligan MD  Admission Date: 2022 - OP- Observation   Length of Stay: 0  History source: EMR, patient and/or patient's family  Face-to-Face encounter date: 2022  Code status: Full    Chief Complaint    Transfer from [facility] ED for NSTEMI    History of Present Illness    This is a 66-year-old male with medical history of former cigarette smoker quit 2016 present to [facility] ED with complaint of chest pain that started  today 2022 around 10:30 AM.  Described the pain as pressure-like substernal, nonradiating and associated with diaphoresis,  pain lasted for about 1 hour and improved with sublingual nitro spray given with EMS and again in the emergency room.  On arrival he was noted to be hemodynamically stable and afebrile and saturating above 95% on room air. EKG  sinus rhythm and showed T-wave inversion lateral leads.    Chest x-ray showed normal cardiac silhouette and no parenchymal lung opacities. Labs notable for troponin 1.078,  elevated transaminases and COVID-19 positive.  He was given nitro paste and currently is chest pain-free.  Cardiology consulted and transferred to [facility] and referred to hospital medicine service for further evaluation and management.    ROS   Except as documented, all other systems reviewed and negative     Past Medical History    Deny and past medical history   Former tobacco smoker    Past Surgical History   Hernia repair  Right high and tendon repair surgery    Social History   Former smoker quit in   Occasional alcohol less than once per month  No drug use    Family History   Reviewed and negative    Allergies   Patient has no known allergies.    Home Medications   Not taking any  medications    Inpatient Medications   Scheduled Meds   aspirin  324 mg Oral Once    [START ON 8/31/2022] aspirin  81 mg Oral Daily    enoxaparin  1 mg/kg Subcutaneous Q12H    [START ON 8/31/2022] famotidine  20 mg Oral BID    metoprolol succinate  12.5 mg Oral Daily     Continuous Infusions    PRN Meds  acetaminophen, acetaminophen, aluminum-magnesium hydroxide-simethicone, benzonatate, dextromethorphan-guaiFENesin  mg/5 ml, melatonin, nitroGLYCERIN, ondansetron, polyethylene glycol, prochlorperazine, senna-docusate 8.6-50 mg, simethicone, sodium chloride 0.9%    Physical Exam   Vital Signs  Temp:  [98.2 °F (36.8 °C)]   Pulse:  [66-84]   Resp:  [10-22]   BP: (106-138)/(73-98)   SpO2:  [95 %-98 %]    General: Appears comfortable  HEENT: NC/AT  Neck:  No JVD  Chest: CTABL  CVS: Regular rhythm. Normal S1/S2.  Abdomen: nondistended, normoactive BS, soft and non-tender.  MSK: No obvious deformity or joint swelling  Skin: Warm and dry  Neuro: AAOx3, no focal neurological deficit  Psych: Cooperative    Labs     Recent Labs     08/30/22  1337   WBC 7.2   RBC 4.04*   HGB 11.8*   HCT 36.6*   MCV 90.6   MCH 29.2   MCHC 32.2*   RDW 13.6          Recent Labs     08/30/22  1337   D-DIMER 0.44        Recent Labs     08/30/22  1337      K 4.4   CHLORIDE 110*   CO2 23   BUN 13.0   CREATININE 0.78   GLUCOSE 111   CALCIUM 9.3   ALBUMIN 3.7   GLOBULIN 2.6   ALKPHOS 51   *   *   BILITOT 0.4   LIPASE 125*     Recent Labs     08/30/22  1337   TROPONINI 1.078*          Microbiology Results (last 7 days)       ** No results found for the last 168 hours. **           Imaging     No orders to display     Assessment & Plan   NSTEMI - Unknown type  COVID19 postive- no respiratory symptoms at this time  Elevated transaminases    Plan:    Currently chest pain-free, no shortness breath and saturating  above 95% on room air  Trend Troponin Q6H until peak, check CKx1  Repeat EKG NOW  Transthoracic ECHO -  pending  Start Aspirin 324 mg x1 then 81mg daily  Start Enoxaparin 1 mg/kg SC Q12h  Start Metoprolol succinate 12.5 mg daily, first dose NOW  Hold on initiating statin due to elevated transaminases  Check A1c and lipid panel  Abdominal US, Hepatitis viral panel and HIV  D-dimer was normal, check CRP and ferritin  Isolation precation  Cardiology consult  VTE Prophylaxis:  FD enoxaparin    Critical care time:  35 minutes  Critical care diagnosis: NSTEMI    Yomi Milligan MD  Internal Medicine

## 2022-08-31 NOTE — PROGRESS NOTES
[facility] Hospital Medicine Progress Note        Chief Complaint: Inpatient Follow-up for NSTEMI    HPI: This is a 66-year-old male with medical history of former cigarette smoker quit 2016 present to [facility] ED with complaint of chest pain that started  today 8/30/2022 around 10:30 AM.  Described the pain as pressure-like substernal, nonradiating and associated with diaphoresis,  pain lasted for about 1 hour and improved with sublingual nitro spray given with EMS and again in the emergency room.  On arrival he was noted to be hemodynamically stable and afebrile and saturating above 95% on room air. EKG  sinus rhythm and showed T-wave inversion lateral leads.    Chest x-ray showed normal cardiac silhouette and no parenchymal lung opacities. Labs notable for troponin 1.078,  elevated transaminases and COVID-19 positive.  He was given nitro paste and currently is chest pain-free.  Cardiology consulted and transferred to [facility] and referred to hospital medicine service for further evaluation and management.    Interval Hx:   Laying in bed.  Report he is feeling okay.  No chest pain anymore.  Currently NPO.  Plan for angiogram today  Patient report he is vaccinated x2 + booster x1.  Has no COVID symptoms of cough, shortness of breath, body aches, fever  Reports does intermittent fasting  No family at bedside  Case discussed with patient's nurse on the phone    Objective/physical exam:  General: In no acute distress, afebrile  Chest: Clear to auscultation bilaterally  Heart: RRR, +S1, S2, no appreciable murmur  Abdomen: Soft, nontender, BS +  MSK: Warm, no lower extremity edema, no clubbing or cyanosis  Neurologic: Alert and oriented x4, Cranial nerve II-XII intact, Strength 5/5 in all 4 extremities    VITAL SIGNS: 24 HRS MIN & MAX LAST   Temp  Min: 98 °F (36.7 °C)  Max: 98.5 °F (36.9 °C) 98 °F (36.7 °C)   BP  Min: 106/75  Max: 138/94 129/83   Pulse  Min: 66  Max: 84  70   Resp  Min:  10  Max: 22 18   SpO2  Min: 95 %  Max: 98 % 98 %       Recent Labs   Lab 08/30/22  1337 08/31/22  0344   WBC 7.2 8.7   RBC 4.04* 4.25*   HGB 11.8* 12.5*   HCT 36.6* 39.3*   MCV 90.6 92.5   MCH 29.2 29.4   MCHC 32.2* 31.8*   RDW 13.6 13.8    313   MPV 10.5* 11.5*       Recent Labs   Lab 08/30/22  1337 08/31/22  0344    140   K 4.4 4.2   CO2 23 21*   BUN 13.0 11.2   CREATININE 0.78 0.77   CALCIUM 9.3 9.3   MG  --  2.20   ALBUMIN 3.7 3.8   ALKPHOS 51 50   * 104*   * 64*   BILITOT 0.4 0.3          Microbiology Results (last 7 days)       ** No results found for the last 168 hours. **             See below for Radiology    Scheduled Med:   aspirin  81 mg Oral Daily    enoxaparin  1 mg/kg Subcutaneous Q12H    famotidine  20 mg Oral BID    metoprolol succinate  12.5 mg Oral Daily    ticagrelor  90 mg Oral BID        Continuous Infusions:       PRN Meds:  acetaminophen, acetaminophen, aluminum-magnesium hydroxide-simethicone, benzonatate, dextromethorphan-guaiFENesin  mg/5 ml, melatonin, nitroGLYCERIN, ondansetron, polyethylene glycol, prochlorperazine, senna-docusate 8.6-50 mg, simethicone, sodium chloride 0.9%       Assessment/Plan:  NSTEMI - probably type 1  COVID19 postive- no respiratory symptoms at this time  Elevated transaminases          Currently chest pain-free, no shortness breath and saturating  above 95% on room air  Troponin trending up  1.078 --> 2.866--> 4.00--> 3.87  Cardiology on board, appreciate recommendation  Currently NPO for left heart catheterization today  Transthoracic ECHO - pending  Received Aspirin 324 mg x1 then 81mg daily  Started Enoxaparin 1 mg/kg SC Q12h  Started Metoprolol succinate 12.5 mg daily  Hold on initiating statin due to elevated transaminases  A1c 5.9 and lipid panel with .   Abdominal US--> hepatic steatosis   Hepatitis viral panel-nonreactive   HIV- nonreactive  UDS positive for THC   Maintain covid isolation and precautions per  CDC guideline  D-dimer was normal  Check CRP 2.19 and ferritin 69, both wnl  Morning CBC, CMP, Mag ordered       VTE prophylaxis: FD enoxaparin    Patient condition:  Guarded    Anticipated discharge and Disposition:   TBD    Critical care note:  Critical care diagnosis:  NSTEMI type 1 on full-dose Lovenox  Critical care interventions: Hands-on evaluation, review of labs/radiographs/records and discussion with patient and family if present  Critical care time spent: 35 minutes        All diagnosis and differential diagnosis have been reviewed; assessment and plan has been documented; I have personally reviewed the labs and test results that are presently available; I have reviewed the patients medication list; I have reviewed the consulting providers response and recommendations. I have reviewed or attempted to review medical records based upon their availability    All of the patient's questions have been  addressed and answered. Patient's is agreeable to the above stated plan. I will continue to monitor closely and make adjustments to medical management as needed.  _____________________________________________________________________    Nutrition Status:    Radiology:  US Abdomen Limited  Narrative: EXAMINATION:  US ABDOMEN LIMITED    CLINICAL HISTORY:  transaminitis;    COMPARISON:  13 June 2019.    FINDINGS:  Grayscale, color and spectral doppler evaluation of the right upper quadrant.    No focal abnormality of limited visualized pancreas. Abdominal aorta is not seen.  Imaged portion of the IVC normal in caliber.    Liver is upper limit of normal in size.  There is mildly increased hepatic parenchymal echogenicity.  There is no focal liver lesion.  Normal hepatopetal flow is noted in the portal vein.    No gallstones. No significant gallbladder wall thickening or pericholecystic fluid.  The common bile duct is normal in caliber  and measures 3 mm.    Right kidney measures 9 cm in length. No  hydronephrosis.  Impression: Suspect hepatic steatosis.    Electronically signed by: Raghav Her  Date:    08/31/2022  Time:    06:57      Bahman Riojas MD   08/31/2022

## 2022-08-31 NOTE — CONSULTS
Inpatient consult to Cardiology  Consult performed by: SHELBY Coker  Consult ordered by: Yomi Milligan MD  Reason for consult: NSTEMI    [facility] Medical Tele[facility]  Cardiology  Consult Note    Patient Name: Gustavo Cheung  MRN: [id number]  Admission Date: 8/30/2022  Hospital Length of Stay: 0 days  Code Status: Full Code   Attending Provider: Bahman Riojas MD   Consulting Provider: SHELBY Coker  Primary Care Physician: No primary care provider on file.  Principal Problem:<principal problem not specified>    Patient information was obtained from patient, past medical records, and ER records.     Subjective:     Chief Complaint:  CP, NSTEMI     HPI:   Mr. Cheung is a 66 year old male who is unknown to [facility]. He presented to [facility] ED on 8.30.22 with complaints of chest pain that started yesterday. He describes the pain as pressure-like substernal, nonradiating with an association of diaphoresis. He reports that the pain lasted for about an hour and resolved with nitro SL provided by EMS. Upon arrival to the ED, his initial troponin was 1.078 and AST//104. He was also noted to be COVID positive. His UDS was positive for cannabis. His EKG demonstrated SR w/ T-wave inversion in lateral leads. He denies current CP, SOB, or palps. [facility] has been consulted to further evaluate the patient's CP and elevated troponin.     PMH: pre DM  PSH: hernia repair, right thigh & tendon repair surgery  Family History: Noncontributory  Social History: Ex smoker (quit in 2016). Reports occasional alcohol use (less than once/month). Denies drug use.     Previous Cardiac Diagnostics:   None for review      Review of patient's allergies indicates:  No Known Allergies    Review of Systems   Respiratory:  Negative for shortness of breath.    Cardiovascular:  Negative for chest pain and palpitations.     Objective:     Vital Signs (Most Recent):  Temp: 98.5 °F (36.9 °C) (08/31/22 0441)  Pulse:  72 (08/31/22 0441)  Resp: 18 (08/31/22 0057)  BP: 128/80 (08/31/22 0441)  SpO2: 96 % (08/31/22 0441) Vital Signs (24h Range):  Temp:  [98.2 °F (36.8 °C)-98.5 °F (36.9 °C)] 98.5 °F (36.9 °C)  Pulse:  [66-84] 72  Resp:  [10-22] 18  SpO2:  [95 %-98 %] 96 %  BP: (106-138)/(73-98) 128/80     Weight: 68 kg (150 lb)  Body mass index is 25.75 kg/m².    SpO2: 96 %       No intake or output data in the 24 hours ending 08/31/22 0752    Lines/Drains/Airways       Peripheral Intravenous Line  Duration                  Peripheral IV - Single Lumen 08/30/22 1214 20 G Right Antecubital <1 day                    Significant Labs:  Recent Results (from the past 72 hour(s))   COVID/FLU A&B PCR    Collection Time: 08/30/22  1:25 PM   Result Value Ref Range    Influenza A PCR Not Detected Not Detected    Influenza B PCR Not Detected Not Detected    SARS-CoV-2 PCR Detected (A) Not Detected   Comprehensive metabolic panel    Collection Time: 08/30/22  1:37 PM   Result Value Ref Range    Sodium Level 142 136 - 145 mmol/L    Potassium Level 4.4 3.5 - 5.1 mmol/L    Chloride 110 (H) 98 - 107 mmol/L    Carbon Dioxide 23 23 - 31 mmol/L    Glucose Level 111 82 - 115 mg/dL    Blood Urea Nitrogen 13.0 8.4 - 25.7 mg/dL    Creatinine 0.78 0.73 - 1.18 mg/dL    Calcium Level Total 9.3 8.8 - 10.0 mg/dL    Protein Total 6.3 5.8 - 7.6 gm/dL    Albumin Level 3.7 3.4 - 4.8 gm/dL    Globulin 2.6 2.4 - 3.5 gm/dL    Albumin/Globulin Ratio 1.4 1.1 - 2.0 ratio    Bilirubin Total 0.4 <=1.5 mg/dL    Alkaline Phosphatase 51 40 - 150 unit/L    Alanine Aminotransferase 141 (H) 0 - 55 unit/L    Aspartate Aminotransferase 112 (H) 5 - 34 unit/L    eGFR >60 mls/min/1.73/m2   D dimer, quantitative    Collection Time: 08/30/22  1:37 PM   Result Value Ref Range    D-Dimer 0.44 0.00 - 0.50 ug/mL FEU   Lipase    Collection Time: 08/30/22  1:37 PM   Result Value Ref Range    Lipase Level 125 (H) <=60 U/L   Troponin I    Collection Time: 08/30/22  1:37 PM   Result Value Ref  Range    Troponin-I 1.078 (H) 0.000 - 0.045 ng/mL   CBC with Differential    Collection Time: 08/30/22  1:37 PM   Result Value Ref Range    WBC 7.2 4.5 - 11.5 x10(3)/mcL    RBC 4.04 (L) 4.70 - 6.10 x10(6)/mcL    Hgb 11.8 (L) 14.0 - 18.0 gm/dL    Hct 36.6 (L) 42.0 - 52.0 %    MCV 90.6 80.0 - 94.0 fL    MCH 29.2 27.0 - 31.0 pg    MCHC 32.2 (L) 33.0 - 36.0 mg/dL    RDW 13.6 11.5 - 17.0 %    Platelet 291 130 - 400 x10(3)/mcL    MPV 10.5 (H) 7.4 - 10.4 fL    Neut % 73.0 %    Lymph % 15.9 %    Mono % 9.2 %    Eos % 1.0 %    Basophil % 0.6 %    Lymph # 1.14 0.6 - 4.6 x10(3)/mcL    Neut # 5.2 2.1 - 9.2 x10(3)/mcL    Mono # 0.66 0.1 - 1.3 x10(3)/mcL    Eos # 0.07 0 - 0.9 x10(3)/mcL    Baso # 0.04 0 - 0.2 x10(3)/mcL    IG# 0.02 0 - 0.04 x10(3)/mcL    IG% 0.3 %   Drug Screen, Urine    Collection Time: 08/30/22  2:02 PM   Result Value Ref Range    Amphetamines, Urine Negative Negative    Barbituates, Urine Negative Negative    Benzodiazepine, Urine Negative Negative    Cannabinoids, Urine Positive (A) Negative    Cocaine, Urine Negative Negative    Fentanyl, Urine Negative Negative    MDMA, Urine Negative Negative    Opiates, Urine Negative Negative    Phencyclidine, Urine Negative Negative    pH, Urine 5.5 3.0 - 11.0    Specific Gravity, Urine Auto 1.025 1.001 - 1.035   Urinalysis, Reflex to Urine Culture Urine, Clean Catch    Collection Time: 08/30/22  2:02 PM    Specimen: Urine   Result Value Ref Range    Color, UA Yellow Yellow, Colorless, Other, Clear    Appearance, UA Clear Clear    Specific Gravity, UA 1.025     pH, UA 5.5 5.0, 5.5, 6.0, 6.5, 7.0, 7.5, 8.0, 8.5    Protein, UA 30 (A) Negative, 300  mg/dL    Glucose, UA Negative Negative, Normal mg/dL    Ketones, UA Negative Negative, +1, +2, +3, +4, +5, >=160, >=80 mg/dL    Blood, UA Negative Negative unit/L    Bilirubin, UA Negative Negative mg/dL    Urobilinogen, UA 0.2 0.2, 1.0, Normal mg/dL    Nitrites, UA Negative Negative    Leukocyte Esterase, UA Negative Negative,  75  unit/L   Urinalysis, Microscopic    Collection Time: 08/30/22  2:02 PM   Result Value Ref Range    Bacteria, UA None Seen None Seen, Rare, Occasional /HPF    Mucous, UA Small (A) None Seen /LPF    RBC, UA None Seen None Seen, 0-2, 3-5, 0-5 /HPF    WBC, UA 0-2 None Seen, 0-2, 3-5, 0-5 /HPF    Squamous Epithelial Cells, UA Rare None Seen, Rare, Occasional, Occ /HPF   Troponin I    Collection Time: 08/30/22 11:37 PM   Result Value Ref Range    Troponin-I 2.866 (H) 0.000 - 0.045 ng/mL   Hemoglobin A1C    Collection Time: 08/30/22 11:37 PM   Result Value Ref Range    Hemoglobin A1c 5.9 <=7.0 %    Estimated Average Glucose 122.6 mg/dL   Lipid Panel    Collection Time: 08/30/22 11:37 PM   Result Value Ref Range    Cholesterol Total 180 <=200 mg/dL    HDL Cholesterol 41 35 - 60 mg/dL    Triglyceride 156 (H) 34 - 140 mg/dL    Cholesterol/HDL Ratio 4 0 - 5    Very Low Density Lipoprotein 31     LDL Cholesterol 108.00 50.00 - 140.00 mg/dL   CRP, High Sensitivity    Collection Time: 08/30/22 11:37 PM   Result Value Ref Range    C-Reactive Protein High Sensitivity 2.19 <=5.00 mg/L   Ferritin    Collection Time: 08/30/22 11:37 PM   Result Value Ref Range    Ferritin Level 69.62 21.81 - 274.66 ng/mL   BNP    Collection Time: 08/30/22 11:37 PM   Result Value Ref Range    Natriuretic Peptide 191.8 (H) <=100.0 pg/mL   CK    Collection Time: 08/30/22 11:37 PM   Result Value Ref Range    Creatine Kinase 243 (H) 30 - 200 U/L   HIV 1/2 Ag/Ab (4th Gen)    Collection Time: 08/30/22 11:37 PM   Result Value Ref Range    HIV Nonreactive Nonreactive   Troponin I    Collection Time: 08/31/22  3:44 AM   Result Value Ref Range    Troponin-I 4.003 (H) 0.000 - 0.045 ng/mL   Comprehensive Metabolic Panel    Collection Time: 08/31/22  3:44 AM   Result Value Ref Range    Sodium Level 140 136 - 145 mmol/L    Potassium Level 4.2 3.5 - 5.1 mmol/L    Chloride 110 (H) 98 - 107 mmol/L    Carbon Dioxide 21 (L) 23 - 31 mmol/L    Glucose Level 95 82 - 115  mg/dL    Blood Urea Nitrogen 11.2 8.4 - 25.7 mg/dL    Creatinine 0.77 0.73 - 1.18 mg/dL    Calcium Level Total 9.3 8.8 - 10.0 mg/dL    Protein Total 6.0 5.8 - 7.6 gm/dL    Albumin Level 3.8 3.4 - 4.8 gm/dL    Globulin 2.2 (L) 2.4 - 3.5 gm/dL    Albumin/Globulin Ratio 1.7 1.1 - 2.0 ratio    Bilirubin Total 0.3 <=1.5 mg/dL    Alkaline Phosphatase 50 40 - 150 unit/L    Alanine Aminotransferase 104 (H) 0 - 55 unit/L    Aspartate Aminotransferase 64 (H) 5 - 34 unit/L    eGFR >60 mls/min/1.73/m2   Magnesium    Collection Time: 08/31/22  3:44 AM   Result Value Ref Range    Magnesium Level 2.20 1.60 - 2.60 mg/dL   Phosphorus    Collection Time: 08/31/22  3:44 AM   Result Value Ref Range    Phosphorus Level 3.8 2.3 - 4.7 mg/dL   CBC with Differential    Collection Time: 08/31/22  3:44 AM   Result Value Ref Range    WBC 8.7 4.5 - 11.5 x10(3)/mcL    RBC 4.25 (L) 4.70 - 6.10 x10(6)/mcL    Hgb 12.5 (L) 14.0 - 18.0 gm/dL    Hct 39.3 (L) 42.0 - 52.0 %    MCV 92.5 80.0 - 94.0 fL    MCH 29.4 27.0 - 31.0 pg    MCHC 31.8 (L) 33.0 - 36.0 mg/dL    RDW 13.8 11.5 - 17.0 %    Platelet 313 130 - 400 x10(3)/mcL    MPV 11.5 (H) 7.4 - 10.4 fL    Neut % 62.0 %    Lymph % 26.7 %    Mono % 8.3 %    Eos % 2.2 %    Basophil % 0.6 %    Lymph # 2.32 0.6 - 4.6 x10(3)/mcL    Neut # 5.4 2.1 - 9.2 x10(3)/mcL    Mono # 0.72 0.1 - 1.3 x10(3)/mcL    Eos # 0.19 0 - 0.9 x10(3)/mcL    Baso # 0.05 0 - 0.2 x10(3)/mcL    IG# 0.02 0 - 0.04 x10(3)/mcL    IG% 0.2 %    NRBC% 0.0 %       Significant Imaging:      EKG:          Telemetry:  SR    Physical Exam  Vitals reviewed.   Cardiovascular:      Rate and Rhythm: Normal rate and regular rhythm.   Deferred s/t active COVID 19 infection.     Home Medications:   Current Facility-Administered Medications on File Prior to Encounter   Medication Dose Route Frequency Provider Last Rate Last Admin    [COMPLETED] nitroGLYCERIN 2% TD oint ointment 1 inch  1 inch Transdermal ED 1 Time Kemar Burns MD   1 inch at 08/30/22  4141     No current outpatient medications on file prior to encounter.       Current Inpatient Medications:    Current Facility-Administered Medications:     acetaminophen tablet 1,000 mg, 1,000 mg, Oral, Q6H PRN, Yomi Milligan MD    acetaminophen tablet 650 mg, 650 mg, Oral, Q4H PRN, Yomi Milligan MD    aluminum-magnesium hydroxide-simethicone 200-200-20 mg/5 mL suspension 30 mL, 30 mL, Oral, QID PRN, Yomi Milligan MD    aspirin chewable tablet 81 mg, 81 mg, Oral, Daily, Yomi Milligan MD    benzonatate capsule 200 mg, 200 mg, Oral, TID PRN, Ymoi Milligan MD    dextromethorphan-guaiFENesin  mg/5 ml liquid 5 mL, 5 mL, Oral, Q4H PRN, Yomi Milligan MD    enoxaparin injection 70 mg, 1 mg/kg, Subcutaneous, Q12H, Ymoi Milligan MD, 70 mg at 08/31/22 0011    famotidine tablet 20 mg, 20 mg, Oral, BID, Yomi Milligan MD    melatonin tablet 6 mg, 6 mg, Oral, Nightly PRN, Yomi Milligan MD    metoprolol succinate (TOPROL-XL) 24 hr split tablet 12.5 mg, 12.5 mg, Oral, Daily, Yomi Milligan MD, 12.5 mg at 08/31/22 0011    nitroGLYCERIN SL tablet 0.4 mg, 0.4 mg, Sublingual, Q5 Min PRN, Yomi Milligan MD    ondansetron injection 4 mg, 4 mg, Intravenous, Q4H PRN, Yomi Milligan MD    polyethylene glycol packet 17 g, 17 g, Oral, BID PRN, Yomi Milligan MD    prochlorperazine injection Soln 5 mg, 5 mg, Intravenous, Q6H PRN, Yomi Milligan MD    senna-docusate 8.6-50 mg per tablet 1 tablet, 1 tablet, Oral, BID PRN, Yomi Milligan MD    simethicone chewable tablet 80 mg, 1 tablet, Oral, QID PRN, Yomi Milligan MD    sodium chloride 0.9% flush 10 mL, 10 mL, Intravenous, PRN, Yomi Milligan MD    ticagrelor tablet 90 mg, 90 mg, Oral, BID, Yomi Milligan MD         VTE Risk Mitigation (From admission, onward)           Ordered     enoxaparin injection 70 mg  Every 12 hours         08/30/22 2316     IP VTE LOW RISK PATIENT  Once         08/30/22 2316     Place sequential compression device  Until discontinued         08/30/22 2316                    Assessment:    NSTEMI - Unknown Type    - Trop 4.003 (no peak reached yet).    - EKG changes     - Currently CP free  Active COVID 19 infection  Transaminitis  Pre DM  No Hx of GIB    Plan:   Trend troponins until peak.  Echo pending.   Continue ASA & BB.  Hold Statin s/t elevated liver enzymes.  Plans for LHC today +/- stenting.   R/B/A discussed with the patient. He agrees to proceed with the procedure.  Verbal consent obtained.  Labs and EKG in AM: CBC, CMP, & Mg.       Thank you for your consult.     SHELBY Coker  Cardiology  [facility] Tele[facility]  08/31/2022 7:52 AM

## 2022-08-31 NOTE — CLINICAL REVIEW
66-year-old male admitted on 08/30/2022 as a transfer from [facility].  The patient had a significant troponin elevation of 1.078.  He was also found to be COVID-19 positive.  Cardiology consulted and he was transferred here for further management.  The patient has been started on ACS protocol.  Troponin has continued to peak at a level of 4.  With a diagnosis of type 1 NSTEMI, the patient is undergoing cardiac catheterization.  He remains appropriate for inpatient level of care    Thompson Kan MD  Utilization Management  Physician Advisor

## 2022-09-01 LAB
ALBUMIN SERPL-MCNC: 3.8 GM/DL (ref 3.4–4.8)
ALBUMIN/GLOB SERPL: 1.3 RATIO (ref 1.1–2)
ALP SERPL-CCNC: 49 UNIT/L (ref 40–150)
ALT SERPL-CCNC: 77 UNIT/L (ref 0–55)
APTT PPP: 36.6 SECONDS (ref 23.2–33.7)
APTT PPP: 37.7 SECONDS (ref 23.2–33.7)
APTT PPP: 58.2 SECONDS (ref 23.2–33.7)
AST SERPL-CCNC: 47 UNIT/L (ref 5–34)
BASOPHILS # BLD AUTO: 0.07 X10(3)/MCL (ref 0–0.2)
BASOPHILS NFR BLD AUTO: 0.9 %
BILIRUBIN DIRECT+TOT PNL SERPL-MCNC: 0.3 MG/DL
BUN SERPL-MCNC: 8 MG/DL (ref 8.4–25.7)
CALCIUM SERPL-MCNC: 9.3 MG/DL (ref 8.8–10)
CHLORIDE SERPL-SCNC: 110 MMOL/L (ref 98–107)
CO2 SERPL-SCNC: 20 MMOL/L (ref 23–31)
CREAT SERPL-MCNC: 0.71 MG/DL (ref 0.73–1.18)
EOSINOPHIL # BLD AUTO: 0.19 X10(3)/MCL (ref 0–0.9)
EOSINOPHIL NFR BLD AUTO: 2.3 %
ERYTHROCYTE [DISTWIDTH] IN BLOOD BY AUTOMATED COUNT: 14 % (ref 11.5–17)
GFR SERPLBLD CREATININE-BSD FMLA CKD-EPI: >60 MLS/MIN/1.73/M2
GLOBULIN SER-MCNC: 3 GM/DL (ref 2.4–3.5)
GLUCOSE SERPL-MCNC: 85 MG/DL (ref 82–115)
GROUP & RH: NORMAL
HCT VFR BLD AUTO: 41.4 % (ref 42–52)
HGB BLD-MCNC: 13.4 GM/DL (ref 14–18)
IMM GRANULOCYTES # BLD AUTO: 0.03 X10(3)/MCL (ref 0–0.04)
IMM GRANULOCYTES NFR BLD AUTO: 0.4 %
INDIRECT COOMBS GEL: NORMAL
LYMPHOCYTES # BLD AUTO: 2.59 X10(3)/MCL (ref 0.6–4.6)
LYMPHOCYTES NFR BLD AUTO: 32 %
MAGNESIUM SERPL-MCNC: 2.3 MG/DL (ref 1.6–2.6)
MCH RBC QN AUTO: 30 PG (ref 27–31)
MCHC RBC AUTO-ENTMCNC: 32.4 MG/DL (ref 33–36)
MCV RBC AUTO: 92.6 FL (ref 80–94)
MONOCYTES # BLD AUTO: 0.62 X10(3)/MCL (ref 0.1–1.3)
MONOCYTES NFR BLD AUTO: 7.7 %
NEUTROPHILS # BLD AUTO: 4.6 X10(3)/MCL (ref 2.1–9.2)
NEUTROPHILS NFR BLD AUTO: 56.7 %
NRBC BLD AUTO-RTO: 0 %
PATH REV: NORMAL
PLATELET # BLD AUTO: 301 X10(3)/MCL (ref 130–400)
PMV BLD AUTO: 11.3 FL (ref 7.4–10.4)
POTASSIUM SERPL-SCNC: 4.4 MMOL/L (ref 3.5–5.1)
PROT SERPL-MCNC: 6.8 GM/DL (ref 5.8–7.6)
RBC # BLD AUTO: 4.47 X10(6)/MCL (ref 4.7–6.1)
SODIUM SERPL-SCNC: 138 MMOL/L (ref 136–145)
WBC # SPEC AUTO: 8.1 X10(3)/MCL (ref 4.5–11.5)

## 2022-09-01 PROCEDURE — 86901 BLOOD TYPING SEROLOGIC RH(D): CPT | Performed by: INTERNAL MEDICINE

## 2022-09-01 PROCEDURE — 80053 COMPREHEN METABOLIC PANEL: CPT | Performed by: INTERNAL MEDICINE

## 2022-09-01 PROCEDURE — 85025 COMPLETE CBC W/AUTO DIFF WBC: CPT | Performed by: INTERNAL MEDICINE

## 2022-09-01 PROCEDURE — 86920 COMPATIBILITY TEST SPIN: CPT

## 2022-09-01 PROCEDURE — 25000003 PHARM REV CODE 250: Performed by: INTERNAL MEDICINE

## 2022-09-01 PROCEDURE — 36415 COLL VENOUS BLD VENIPUNCTURE: CPT | Performed by: INTERNAL MEDICINE

## 2022-09-01 PROCEDURE — 21400001 HC TELEMETRY ROOM

## 2022-09-01 PROCEDURE — 83735 ASSAY OF MAGNESIUM: CPT | Performed by: INTERNAL MEDICINE

## 2022-09-01 PROCEDURE — 63600175 PHARM REV CODE 636 W HCPCS

## 2022-09-01 PROCEDURE — 85730 THROMBOPLASTIN TIME PARTIAL: CPT | Performed by: INTERNAL MEDICINE

## 2022-09-01 PROCEDURE — 27000207 HC ISOLATION

## 2022-09-01 RX ADMIN — HEPARIN SODIUM 18 UNITS/KG/HR: 10000 INJECTION, SOLUTION INTRAVENOUS at 02:09

## 2022-09-01 RX ADMIN — METOPROLOL SUCCINATE 12.5 MG: 25 TABLET, EXTENDED RELEASE ORAL at 10:09

## 2022-09-01 RX ADMIN — FAMOTIDINE 20 MG: 20 TABLET, FILM COATED ORAL at 10:09

## 2022-09-01 RX ADMIN — Medication 81 MG: at 10:09

## 2022-09-01 RX ADMIN — FAMOTIDINE 20 MG: 20 TABLET, FILM COATED ORAL at 09:09

## 2022-09-01 NOTE — PROGRESS NOTES
[facility] - [facility] Medical Telemetry  Cardiology  Progress Note    Patient Name: Gustavo Cheung  MRN: [id number]  Admission Date: 8/30/2022  Hospital Length of Stay: 1 days  Code Status: Full Code   Attending Physician: Bahman Riojas MD   Primary Care Physician: No primary care provider on file.  Expected Discharge Date:   Principal Problem:<principal problem not specified>    Subjective:     Brief HPI:  Mr. Cheung is a 66 year old male who is unknown to [facility]. He presented to [facility] ED on 8.30.22 with complaints of chest pain that started yesterday. He describes the pain as pressure-like substernal, nonradiating with an association of diaphoresis. He reports that the pain lasted for about an hour and resolved with nitro SL provided by EMS. Upon arrival to the ED, his initial troponin was 1.078 and AST//104. He was also noted to be COVID positive. His UDS was positive for cannabis. His EKG demonstrated SR w/ T-wave inversion in lateral leads. He denies current CP, SOB, or palps. [facility] has been consulted to further evaluate the patient's CP and elevated troponin.     Hospital Course:  PMH: pre DM  PSH: hernia repair, right thigh & tendon repair surgery  Family History: Noncontributory  Social History: Ex smoker (quit in 2016). Reports occasional alcohol use (less than once/month). Denies drug use.      Previous Diagnostics:  [facility] 8.31.22  Left main-short, normal  Lad-99% prox, 80% Mid, Large diagonal occluded with circumflex collaterals  LCX-50% mid, IFR not significant  RCA-dominant, 80% distal involving PDA and PLB  EDP 15 mmHg     Maximize medical managemen  Consult CT surgery for bypass to the LAD, diagonal, PLB, PDA   If patient is turned down for surgery consider staged intervention to LAD across the diagonal  Check out has been given the cardiology service and on-call Cardiology  CT surgery consult has been placed      Echo 8.31.22  The left ventricle is normal in size with concentric  remodeling and mildly decreased systolic function.  The estimated ejection fraction is 42%.  Regional wall motion abnormalities of the LAD and RCA territories.  Grade I left ventricular diastolic dysfunction.  Normal right ventricular size with normal right ventricular systolic function.  The estimated PA systolic pressure is 14 mmHg    Review of Systems   Cardiovascular: Negative.    Respiratory: Negative.     All other systems reviewed and are negative.    Objective:     Vital Signs (Most Recent):  Temp: 98.4 °F (36.9 °C) (09/01/22 1211)  Pulse: 73 (09/01/22 1211)  Resp: (!) 24 (09/01/22 0641)  BP: 132/85 (09/01/22 1211)  SpO2: 97 % (09/01/22 1211)   Vital Signs (24h Range):  Temp:  [97.7 °F (36.5 °C)-98.6 °F (37 °C)] 98.4 °F (36.9 °C)  Pulse:  [59-90] 73  Resp:  [18-24] 24  SpO2:  [96 %-97 %] 97 %  BP: (107-132)/(68-85) 132/85     Weight: 73.4 kg (161 lb 13.1 oz)  Body mass index is 27.78 kg/m².    SpO2: 97 %  O2 Device (Oxygen Therapy): nasal cannula      Intake/Output Summary (Last 24 hours) at 9/1/2022 1518  Last data filed at 9/1/2022 1455  Gross per 24 hour   Intake 720 ml   Output 975 ml   Net -255 ml       Lines/Drains/Airways       Peripheral Intravenous Line  Duration                  Peripheral IV - Single Lumen 08/30/22 1214 20 G Right Antecubital 2 days                    Significant Labs:   Recent Results (from the past 72 hour(s))   COVID/FLU A&B PCR    Collection Time: 08/30/22  1:25 PM   Result Value Ref Range    Influenza A PCR Not Detected Not Detected    Influenza B PCR Not Detected Not Detected    SARS-CoV-2 PCR Detected (A) Not Detected   Comprehensive metabolic panel    Collection Time: 08/30/22  1:37 PM   Result Value Ref Range    Sodium Level 142 136 - 145 mmol/L    Potassium Level 4.4 3.5 - 5.1 mmol/L    Chloride 110 (H) 98 - 107 mmol/L    Carbon Dioxide 23 23 - 31 mmol/L    Glucose Level 111 82 - 115 mg/dL    Blood Urea Nitrogen 13.0 8.4 - 25.7 mg/dL    Creatinine 0.78 0.73 - 1.18 mg/dL     Calcium Level Total 9.3 8.8 - 10.0 mg/dL    Protein Total 6.3 5.8 - 7.6 gm/dL    Albumin Level 3.7 3.4 - 4.8 gm/dL    Globulin 2.6 2.4 - 3.5 gm/dL    Albumin/Globulin Ratio 1.4 1.1 - 2.0 ratio    Bilirubin Total 0.4 <=1.5 mg/dL    Alkaline Phosphatase 51 40 - 150 unit/L    Alanine Aminotransferase 141 (H) 0 - 55 unit/L    Aspartate Aminotransferase 112 (H) 5 - 34 unit/L    eGFR >60 mls/min/1.73/m2   D dimer, quantitative    Collection Time: 08/30/22  1:37 PM   Result Value Ref Range    D-Dimer 0.44 0.00 - 0.50 ug/mL FEU   Lipase    Collection Time: 08/30/22  1:37 PM   Result Value Ref Range    Lipase Level 125 (H) <=60 U/L   Troponin I    Collection Time: 08/30/22  1:37 PM   Result Value Ref Range    Troponin-I 1.078 (H) 0.000 - 0.045 ng/mL   CBC with Differential    Collection Time: 08/30/22  1:37 PM   Result Value Ref Range    WBC 7.2 4.5 - 11.5 x10(3)/mcL    RBC 4.04 (L) 4.70 - 6.10 x10(6)/mcL    Hgb 11.8 (L) 14.0 - 18.0 gm/dL    Hct 36.6 (L) 42.0 - 52.0 %    MCV 90.6 80.0 - 94.0 fL    MCH 29.2 27.0 - 31.0 pg    MCHC 32.2 (L) 33.0 - 36.0 mg/dL    RDW 13.6 11.5 - 17.0 %    Platelet 291 130 - 400 x10(3)/mcL    MPV 10.5 (H) 7.4 - 10.4 fL    Neut % 73.0 %    Lymph % 15.9 %    Mono % 9.2 %    Eos % 1.0 %    Basophil % 0.6 %    Lymph # 1.14 0.6 - 4.6 x10(3)/mcL    Neut # 5.2 2.1 - 9.2 x10(3)/mcL    Mono # 0.66 0.1 - 1.3 x10(3)/mcL    Eos # 0.07 0 - 0.9 x10(3)/mcL    Baso # 0.04 0 - 0.2 x10(3)/mcL    IG# 0.02 0 - 0.04 x10(3)/mcL    IG% 0.3 %   Drug Screen, Urine    Collection Time: 08/30/22  2:02 PM   Result Value Ref Range    Amphetamines, Urine Negative Negative    Barbituates, Urine Negative Negative    Benzodiazepine, Urine Negative Negative    Cannabinoids, Urine Positive (A) Negative    Cocaine, Urine Negative Negative    Fentanyl, Urine Negative Negative    MDMA, Urine Negative Negative    Opiates, Urine Negative Negative    Phencyclidine, Urine Negative Negative    pH, Urine 5.5 3.0 - 11.0    Specific  Gravity, Urine Auto 1.025 1.001 - 1.035   Urinalysis, Reflex to Urine Culture Urine, Clean Catch    Collection Time: 08/30/22  2:02 PM    Specimen: Urine   Result Value Ref Range    Color, UA Yellow Yellow, Colorless, Other, Clear    Appearance, UA Clear Clear    Specific Gravity, UA 1.025     pH, UA 5.5 5.0, 5.5, 6.0, 6.5, 7.0, 7.5, 8.0, 8.5    Protein, UA 30 (A) Negative, 300  mg/dL    Glucose, UA Negative Negative, Normal mg/dL    Ketones, UA Negative Negative, +1, +2, +3, +4, +5, >=160, >=80 mg/dL    Blood, UA Negative Negative unit/L    Bilirubin, UA Negative Negative mg/dL    Urobilinogen, UA 0.2 0.2, 1.0, Normal mg/dL    Nitrites, UA Negative Negative    Leukocyte Esterase, UA Negative Negative, 75  unit/L   Urinalysis, Microscopic    Collection Time: 08/30/22  2:02 PM   Result Value Ref Range    Bacteria, UA None Seen None Seen, Rare, Occasional /HPF    Mucous, UA Small (A) None Seen /LPF    RBC, UA None Seen None Seen, 0-2, 3-5, 0-5 /HPF    WBC, UA 0-2 None Seen, 0-2, 3-5, 0-5 /HPF    Squamous Epithelial Cells, UA Rare None Seen, Rare, Occasional, Occ /HPF   Troponin I    Collection Time: 08/30/22 11:37 PM   Result Value Ref Range    Troponin-I 2.866 (H) 0.000 - 0.045 ng/mL   Hemoglobin A1C    Collection Time: 08/30/22 11:37 PM   Result Value Ref Range    Hemoglobin A1c 5.9 <=7.0 %    Estimated Average Glucose 122.6 mg/dL   Lipid Panel    Collection Time: 08/30/22 11:37 PM   Result Value Ref Range    Cholesterol Total 180 <=200 mg/dL    HDL Cholesterol 41 35 - 60 mg/dL    Triglyceride 156 (H) 34 - 140 mg/dL    Cholesterol/HDL Ratio 4 0 - 5    Very Low Density Lipoprotein 31     LDL Cholesterol 108.00 50.00 - 140.00 mg/dL   CRP, High Sensitivity    Collection Time: 08/30/22 11:37 PM   Result Value Ref Range    C-Reactive Protein High Sensitivity 2.19 <=5.00 mg/L   Ferritin    Collection Time: 08/30/22 11:37 PM   Result Value Ref Range    Ferritin Level 69.62 21.81 - 274.66 ng/mL   BNP    Collection Time:  08/30/22 11:37 PM   Result Value Ref Range    Natriuretic Peptide 191.8 (H) <=100.0 pg/mL   CK    Collection Time: 08/30/22 11:37 PM   Result Value Ref Range    Creatine Kinase 243 (H) 30 - 200 U/L   HIV 1/2 Ag/Ab (4th Gen)    Collection Time: 08/30/22 11:37 PM   Result Value Ref Range    HIV Nonreactive Nonreactive   Hepatitis Panel, Acute    Collection Time: 08/30/22 11:37 PM   Result Value Ref Range    Hepatitis A IgM Nonreactive Nonreactive    Hepatitis B Core IgM Nonreactive Nonreactive    Hepatitis B Surface Antigen Nonreactive Nonreactive    Hepatitis C Antibody Nonreactive Nonreactive   Pathologist Interpretation    Collection Time: 08/30/22 11:37 PM   Result Value Ref Range    Pathology Review       No serological evidence of recent or past hepatitis A, B, or C infection.    Amita Cuevas MD     Troponin I    Collection Time: 08/31/22  3:44 AM   Result Value Ref Range    Troponin-I 4.003 (H) 0.000 - 0.045 ng/mL   Comprehensive Metabolic Panel    Collection Time: 08/31/22  3:44 AM   Result Value Ref Range    Sodium Level 140 136 - 145 mmol/L    Potassium Level 4.2 3.5 - 5.1 mmol/L    Chloride 110 (H) 98 - 107 mmol/L    Carbon Dioxide 21 (L) 23 - 31 mmol/L    Glucose Level 95 82 - 115 mg/dL    Blood Urea Nitrogen 11.2 8.4 - 25.7 mg/dL    Creatinine 0.77 0.73 - 1.18 mg/dL    Calcium Level Total 9.3 8.8 - 10.0 mg/dL    Protein Total 6.0 5.8 - 7.6 gm/dL    Albumin Level 3.8 3.4 - 4.8 gm/dL    Globulin 2.2 (L) 2.4 - 3.5 gm/dL    Albumin/Globulin Ratio 1.7 1.1 - 2.0 ratio    Bilirubin Total 0.3 <=1.5 mg/dL    Alkaline Phosphatase 50 40 - 150 unit/L    Alanine Aminotransferase 104 (H) 0 - 55 unit/L    Aspartate Aminotransferase 64 (H) 5 - 34 unit/L    eGFR >60 mls/min/1.73/m2   Magnesium    Collection Time: 08/31/22  3:44 AM   Result Value Ref Range    Magnesium Level 2.20 1.60 - 2.60 mg/dL   Phosphorus    Collection Time: 08/31/22  3:44 AM   Result Value Ref Range    Phosphorus Level 3.8 2.3 - 4.7 mg/dL   CBC  with Differential    Collection Time: 08/31/22  3:44 AM   Result Value Ref Range    WBC 8.7 4.5 - 11.5 x10(3)/mcL    RBC 4.25 (L) 4.70 - 6.10 x10(6)/mcL    Hgb 12.5 (L) 14.0 - 18.0 gm/dL    Hct 39.3 (L) 42.0 - 52.0 %    MCV 92.5 80.0 - 94.0 fL    MCH 29.4 27.0 - 31.0 pg    MCHC 31.8 (L) 33.0 - 36.0 mg/dL    RDW 13.8 11.5 - 17.0 %    Platelet 313 130 - 400 x10(3)/mcL    MPV 11.5 (H) 7.4 - 10.4 fL    Neut % 62.0 %    Lymph % 26.7 %    Mono % 8.3 %    Eos % 2.2 %    Basophil % 0.6 %    Lymph # 2.32 0.6 - 4.6 x10(3)/mcL    Neut # 5.4 2.1 - 9.2 x10(3)/mcL    Mono # 0.72 0.1 - 1.3 x10(3)/mcL    Eos # 0.19 0 - 0.9 x10(3)/mcL    Baso # 0.05 0 - 0.2 x10(3)/mcL    IG# 0.02 0 - 0.04 x10(3)/mcL    IG% 0.2 %    NRBC% 0.0 %   Troponin I    Collection Time: 08/31/22 11:07 AM   Result Value Ref Range    Troponin-I 3.873 (H) 0.000 - 0.045 ng/mL   Echo    Collection Time: 08/31/22  3:32 PM   Result Value Ref Range    BSA 1.75 m2    TDI SEPTAL 0.06 m/s    LV LATERAL E/E' RATIO 10.67 m/s    LV SEPTAL E/E' RATIO 10.67 m/s    Right Atrial Pressure (from IVC) 8 mmHg    EF 42 %    Left Ventricular Outflow Tract Mean Velocity 0.47 cm/s    Left Ventricular Outflow Tract Mean Gradient 1.00 mmHg    TDI LATERAL 0.06 m/s    LVIDd 4.74 3.5 - 6.0 cm    IVS 0.86 0.6 - 1.1 cm    Posterior Wall 1.04 0.6 - 1.1 cm    LVIDs 3.55 2.1 - 4.0 cm    FS 25 28 - 44 %    LV mass 155.57 g    LA size 3.50 cm    RVDD 3.25 cm    TAPSE 1.61 cm    Left Ventricle Relative Wall Thickness 0.44 cm    AV mean gradient 4 mmHg    AV valve area 1.70 cm2    AV Velocity Ratio 0.53     AV index (prosthetic) 0.54     MV mean gradient 1 mmHg    MV valve area p 1/2 method 3.44 cm2    MV valve area by continuity eq 2.05 cm2    E/A ratio 0.72     Mean e' 0.06 m/s    E wave deceleration time 201.00 msec    LVOT diameter 2.00 cm    LVOT area 3.1 cm2    LVOT peak dominic 0.73 m/s    LVOT peak VTI 13.50 cm    Ao peak dominic 1.37 m/s    Ao VTI 25.0 cm    LVOT stroke volume 42.39 cm3    AV peak  gradient 8 mmHg    MV peak gradient 4 mmHg    TV rest pulmonary artery pressure 14 mmHg    E/E' ratio 10.67 m/s    MV Peak E Dick 0.64 m/s    TR Max Dick 1.23 m/s    MV VTI 20.7 cm    MV stenosis pressure 1/2 time 64.00 ms    MV Peak A Dick 0.89 m/s    LV Systolic Volume 52.60 mL    LV Systolic Volume Index 30.4 mL/m2    LV Diastolic Volume 104.00 mL    LV Diastolic Volume Index 60.12 mL/m2    LV Mass Index 90 g/m2    Triscuspid Valve Regurgitation Peak Gradient 6 mmHg    LA Volume Index (Mod) 19.2 mL/m2    LA volume (mod) 33.20 cm3   APTT    Collection Time: 08/31/22  6:42 PM   Result Value Ref Range    PTT 34.8 (H) 23.2 - 33.7 seconds   Protime-INR    Collection Time: 08/31/22  6:42 PM   Result Value Ref Range    PT 13.8 12.5 - 14.5 seconds    INR 1.07 0.00 - 1.30   PTT Heparin Monitoring    Collection Time: 09/01/22  2:40 AM   Result Value Ref Range    PTT Heparin Monitor 37.7 (H) 23.2 - 33.7 seconds   CBC with Differential    Collection Time: 09/01/22  2:40 AM   Result Value Ref Range    WBC 8.1 4.5 - 11.5 x10(3)/mcL    RBC 4.47 (L) 4.70 - 6.10 x10(6)/mcL    Hgb 13.4 (L) 14.0 - 18.0 gm/dL    Hct 41.4 (L) 42.0 - 52.0 %    MCV 92.6 80.0 - 94.0 fL    MCH 30.0 27.0 - 31.0 pg    MCHC 32.4 (L) 33.0 - 36.0 mg/dL    RDW 14.0 11.5 - 17.0 %    Platelet 301 130 - 400 x10(3)/mcL    MPV 11.3 (H) 7.4 - 10.4 fL    Neut % 56.7 %    Lymph % 32.0 %    Mono % 7.7 %    Eos % 2.3 %    Basophil % 0.9 %    Lymph # 2.59 0.6 - 4.6 x10(3)/mcL    Neut # 4.6 2.1 - 9.2 x10(3)/mcL    Mono # 0.62 0.1 - 1.3 x10(3)/mcL    Eos # 0.19 0 - 0.9 x10(3)/mcL    Baso # 0.07 0 - 0.2 x10(3)/mcL    IG# 0.03 0 - 0.04 x10(3)/mcL    IG% 0.4 %    NRBC% 0.0 %   Comprehensive Metabolic Panel    Collection Time: 09/01/22  3:00 AM   Result Value Ref Range    Sodium Level 138 136 - 145 mmol/L    Potassium Level 4.4 3.5 - 5.1 mmol/L    Chloride 110 (H) 98 - 107 mmol/L    Carbon Dioxide 20 (L) 23 - 31 mmol/L    Glucose Level 85 82 - 115 mg/dL    Blood Urea Nitrogen 8.0  (L) 8.4 - 25.7 mg/dL    Creatinine 0.71 (L) 0.73 - 1.18 mg/dL    Calcium Level Total 9.3 8.8 - 10.0 mg/dL    Protein Total 6.8 5.8 - 7.6 gm/dL    Albumin Level 3.8 3.4 - 4.8 gm/dL    Globulin 3.0 2.4 - 3.5 gm/dL    Albumin/Globulin Ratio 1.3 1.1 - 2.0 ratio    Bilirubin Total 0.3 <=1.5 mg/dL    Alkaline Phosphatase 49 40 - 150 unit/L    Alanine Aminotransferase 77 (H) 0 - 55 unit/L    Aspartate Aminotransferase 47 (H) 5 - 34 unit/L    eGFR >60 mls/min/1.73/m2   Magnesium    Collection Time: 09/01/22  3:00 AM   Result Value Ref Range    Magnesium Level 2.30 1.60 - 2.60 mg/dL   Type & Screen    Collection Time: 09/01/22  5:08 AM   Result Value Ref Range    Group & Rh A POS     Indirect Selena GEL NEG    PTT Heparin Monitoring    Collection Time: 09/01/22 12:22 PM   Result Value Ref Range    PTT Heparin Monitor 36.6 (H) 23.2 - 33.7 seconds       Telemetry: NSR    Physical Exam  Vitals reviewed.   Constitutional:       Appearance: Normal appearance.   Cardiovascular:      Rate and Rhythm: Normal rate and regular rhythm.      Pulses: Normal pulses.      Heart sounds: Normal heart sounds.   Pulmonary:      Effort: Pulmonary effort is normal.      Breath sounds: Normal breath sounds.   Abdominal:      General: Bowel sounds are normal.      Palpations: Abdomen is soft.   Musculoskeletal:         General: Normal range of motion.   Skin:     General: Skin is warm and dry.      Capillary Refill: Capillary refill takes less than 2 seconds.   Neurological:      Mental Status: He is alert and oriented to person, place, and time.       Current Inpatient Medications:    Current Facility-Administered Medications:     acetaminophen tablet 1,000 mg, 1,000 mg, Oral, Q6H PRN, Yomi Milligan MD, 1,000 mg at 08/31/22 1934    acetaminophen tablet 650 mg, 650 mg, Oral, Q4H PRN, Yomi Milligan MD    acetaminophen tablet 650 mg, 650 mg, Oral, Q4H PRN, Luis Daniel Maza MD    aluminum-magnesium hydroxide-simethicone 200-200-20 mg/5 mL suspension  30 mL, 30 mL, Oral, QID PRN, Yomi Milligan MD    aspirin chewable tablet 81 mg, 81 mg, Oral, Daily, Yomi Milligan MD, 81 mg at 09/01/22 1029    benzonatate capsule 200 mg, 200 mg, Oral, TID PRN, Yomi Milligan MD    dextromethorphan-guaiFENesin  mg/5 ml liquid 5 mL, 5 mL, Oral, Q4H PRN, Yomi Milligan MD    famotidine tablet 20 mg, 20 mg, Oral, BID, Yomi Milligan MD, 20 mg at 09/01/22 1029    fentaNYL 50 mcg/mL injection, , , PRN, Luis Daniel Maza MD, 50 mcg at 08/31/22 1546    heparin (porcine) injection, , , PRN, Luis Daniel Maza MD, 5,000 Units at 08/31/22 1549    heparin 25,000 units in dextrose 5% (100 units/ml) IV bolus from bag - ADDITIONAL PRN BOLUS - 30 units/kg (max bolus 4000 units), 30 Units/kg (Adjusted), Intravenous, PRN, Mouna Rm, ARLETP    heparin 25,000 units in dextrose 5% (100 units/ml) IV bolus from bag - ADDITIONAL PRN BOLUS - 60 units/kg (max bolus 4000 units), 60 Units/kg (Adjusted), Intravenous, PRN, SHELBY Coker, 3,760 Units at 09/01/22 1452    heparin 25,000 units in dextrose 5% 250 mL (100 units/mL) infusion LOW INTENSITY nomogram - [facility], 0-40 Units/kg/hr (Adjusted), Intravenous, Continuous, SHELBY Coker, Last Rate: 11.3 mL/hr at 09/01/22 1455, 18 Units/kg/hr at 09/01/22 1455    iopamidoL (ISOVUE-370) injection, , , PRN, Luis Daniel Maza MD, 100 mL at 08/31/22 1635    LIDOcaine HCL 10 mg/ml (1%) injection, , , PRN, Luis Daniel Maza MD, 10 mL at 08/31/22 1546    melatonin tablet 6 mg, 6 mg, Oral, Nightly PRN, Yomi Milligan MD    metoprolol succinate (TOPROL-XL) 24 hr split tablet 12.5 mg, 12.5 mg, Oral, Daily, Yomi Milligan MD, 12.5 mg at 09/01/22 1029    midazolam (VERSED) 1 mg/mL injection, , , PRN, Luis Daniel Maza MD, 1 mg at 08/31/22 1551    nitroGLYCERIN injection, , , PRN, Luis Daniel Maza MD, 200 mcg at 08/31/22 1603    nitroGLYCERIN SL tablet 0.4 mg, 0.4 mg, Sublingual, Q5 Min PRN, Yomi Milligan MD    ondansetron disintegrating tablet 8 mg, 8 mg,  Oral, Q8H PRN, Luis Daniel Maza MD    ondansetron injection 4 mg, 4 mg, Intravenous, Q4H PRN, Yomi Milligan MD    polyethylene glycol packet 17 g, 17 g, Oral, BID PRN, Yomi Milligan MD    prochlorperazine injection Soln 5 mg, 5 mg, Intravenous, Q6H PRN, Yomi Milligan MD    senna-docusate 8.6-50 mg per tablet 1 tablet, 1 tablet, Oral, BID PRN, Yomi Milligan MD    simethicone chewable tablet 80 mg, 1 tablet, Oral, QID PRN, Yomi Milligan MD    sodium chloride 0.9% flush 10 mL, 10 mL, Intravenous, PRN, Yomi Milligan MD    sodium chloride 0.9% flush 10 mL, 10 mL, Intravenous, PRN, Mouna Rm, FNP    verapamiL injection, , , PRN, Luis Daniel Maza MD, 2.5 mg at 08/31/22 1548    VTE Risk Mitigation (From admission, onward)           Ordered     heparin 25,000 units in dextrose 5% (100 units/ml) IV bolus from bag - ADDITIONAL PRN BOLUS - 60 units/kg (max bolus 4000 units)  As needed (PRN)        Question:  Heparin Infusion Adjustment (DO NOT MODIFY ANSWER)  Answer:  \[email or web address]\Images\Pharmacy\HeparinInfusions\heparin LOW INTENSITY nomogram for [facility] [id number].pdf    08/31/22 1642     heparin 25,000 units in dextrose 5% (100 units/ml) IV bolus from bag - ADDITIONAL PRN BOLUS - 30 units/kg (max bolus 4000 units)  As needed (PRN)        Question:  Heparin Infusion Adjustment (DO NOT MODIFY ANSWER)  Answer:  \\[email or web address]\Images\Pharmacy\HeparinInfusions\heparin LOW INTENSITY nomogram for [facility] [id number].pdf    08/31/22 1642     heparin 25,000 units in dextrose 5% 250 mL (100 units/mL) infusion LOW INTENSITY nomogram - [facility]  Continuous        Question Answer Comment   Begin at (in units/kg/hr) 12    Heparin Infusion Adjustment (DO NOT MODIFY ANSWER) [email or web address]\Images\Pharmacy\HeparinInfusions\heparin LOW INTENSITY nomogram for [facility] [id number].pdf        08/31/22 1642     heparin (porcine) injection  As needed (PRN)         08/31/22 1550     IP VTE LOW RISK PATIENT  Once         08/30/22 2316     Place sequential  compression device  Until discontinued         08/30/22 8052                    Assessment:     NSTEMI -Type 1    - Trop 4.00 trending down    - EKG changes     - Currently CP free  Multi Vessel CAD   -Left main-normal; LAD-99% prox, mid, large diagonal occluded with circumflex collaterals; Left CX-50% mid, IFR not significant; RCA-dominant, 80% distal involving PDA and PLB; EDP 15 mm HG.  Acute Systolic/Diastolic Heart Failure-euvolemic on exam   -EF is 42%   -Grade I DD   -regional wall motion abnormalities of the LAD and RCA territories  Active COVID 19 infection  Transaminitis  Pre DM  No Hx of GIB    Plan:   Consult CV surgery for CABG evaluation  Continue heparin gtt.  Continue Asa/BB  Hold Statin s/t elevated liver enzymes  CBC/CMP/Mag in am    SHELBY Sanchez  Cardiology  [facility] - [address] [place] [address]  09/01/2022

## 2022-09-01 NOTE — PROGRESS NOTES
[facility]  Hospital Medicine Progress Note        Chief Complaint: Inpatient Follow-up for NSTEMI    HPI: This is a 66-year-old male with medical history of former cigarette smoker quit 2016 present to [facility] ED with complaint of chest pain that started  today 8/30/2022 around 10:30 AM.  Described the pain as pressure-like substernal, nonradiating and associated with diaphoresis, pain lasted for about 1 hour and improved with sublingual nitro spray given with EMS and again in the emergency room.  On arrival he was noted to be hemodynamically stable and afebrile and saturating above 95% on room air. EKG  sinus rhythm and showed T-wave inversion lateral leads. Chest x-ray showed normal cardiac silhouette and no parenchymal lung opacities. Labs notable for troponin 1.078,  elevated transaminases and COVID-19 positive.  He was given nitro paste and currently is chest pain-free.  Cardiology consulted and transferred to [facility] and referred to hospital medicine service for further evaluation and management.    Interval Hx:   Laying in bed.  Aware of his angiogram result and feels very surprised that he had 3 vessels disease.  Had a long conversation with the patient and he had a lot of questions regarding surgery and recovery, I gave him a brief run and informed him that cardiothoracic surgeon Dr. Le will come see him and answered all his questions in depth.  Verbalized understanding.  Very appreciative of the care he is getting in the hospital    No family at bedside  Case discussed with patient's nurse on the floor    Objective/physical exam:  General: In no acute distress, afebrile  Chest: Clear to auscultation bilaterally  Heart: RRR, +S1, S2, no appreciable murmur  Abdomen: Soft, nontender, BS +  MSK: Warm, no lower extremity edema, no clubbing or cyanosis  Neurologic: Alert and oriented x4, Cranial nerve II-XII intact, Strength 5/5 in all 4 extremities    VITAL SIGNS: 24 HRS MIN  & MAX LAST   Temp  Min: 97.7 °F (36.5 °C)  Max: 98.6 °F (37 °C) 97.9 °F (36.6 °C)   BP  Min: 107/68  Max: 129/83 111/77   Pulse  Min: 59  Max: 90  90   Resp  Min: 18  Max: 24 (!) 24   SpO2  Min: 96 %  Max: 98 % 96 %       Recent Labs   Lab 08/30/22  1337 08/31/22  0344 09/01/22  0240   WBC 7.2 8.7 8.1   RBC 4.04* 4.25* 4.47*   HGB 11.8* 12.5* 13.4*   HCT 36.6* 39.3* 41.4*   MCV 90.6 92.5 92.6   MCH 29.2 29.4 30.0   MCHC 32.2* 31.8* 32.4*   RDW 13.6 13.8 14.0    313 301   MPV 10.5* 11.5* 11.3*       Recent Labs   Lab 08/30/22 1337 08/31/22  0344 09/01/22  0300    140 138   K 4.4 4.2 4.4   CO2 23 21* 20*   BUN 13.0 11.2 8.0*   CREATININE 0.78 0.77 0.71*   CALCIUM 9.3 9.3 9.3   MG  --  2.20 2.30   ALBUMIN 3.7 3.8 3.8   ALKPHOS 51 50 49   * 104* 77*   * 64* 47*   BILITOT 0.4 0.3 0.3          Microbiology Results (last 7 days)       ** No results found for the last 168 hours. **             See below for Radiology    Scheduled Med:   aspirin  81 mg Oral Daily    famotidine  20 mg Oral BID    metoprolol succinate  12.5 mg Oral Daily        Continuous Infusions:   heparin (porcine) in D5W 15 Units/kg/hr (09/01/22 0542)        PRN Meds:  acetaminophen, acetaminophen, acetaminophen, aluminum-magnesium hydroxide-simethicone, benzonatate, dextromethorphan-guaiFENesin  mg/5 ml, fentaNYL, heparin (porcine), heparin (PORCINE), heparin (PORCINE), iopamidoL, LIDOcaine HCL 10 mg/ml (1%), melatonin, midazolam, nitroGLYCERIN, nitroGLYCERIN, ondansetron, ondansetron, polyethylene glycol, prochlorperazine, senna-docusate 8.6-50 mg, simethicone, sodium chloride 0.9%, sodium chloride 0.9%, verapamiL       Assessment/Plan:  NSTEMI - probably type 1  New acute systolic heart failure with EF 42% and grade 1 diastolic dysfunction  COVID 19 postive- no respiratory symptoms at this time  Hyperlipidemia  Elevated transaminases          Currently chest pain-free, no shortness breath and saturating  above 95% on  room air  Troponin trending up  1.078 --> 2.866--> 4.00--> 3.87  8/31- Underwent LHC --> multivessel disease, consulted cardiothoracic surgery Dr Le for bypass to the LAD, diagonal, PLB, PDA   Transthoracic ECHO - The estimated ejection fraction is 42%. Regional wall motion abnormalities of the LAD and RCA territories. Grade I left ventricular diastolic dysfunction.  Received Aspirin 324 mg x1 on admission and now 81mg daily  Received Enoxaparin 1 mg/kg SC Q12h and now changed to heparin drip post LHC   Cont Metoprolol succinate 12.5 mg daily  Hold on initiating statin due to elevated transaminases  A1c 5.9 and lipid panel with .   Abdominal US--> hepatic steatosis   Hepatitis viral panel-nonreactive   HIV- nonreactive  UDS positive for THC   Maintain covid isolation and precautions per CDC guideline  D-dimer was normal  CRP 2.19 and ferritin 69, both wnl  Morning CBC , CMP, Mag ordered       VTE prophylaxis: Heparin drip    Patient condition:  Guarded    Anticipated discharge and Disposition:   TBD    Critical care note:  Critical care diagnosis:  NSTEMI type 1 on heparin drip  Critical care interventions: Hands-on evaluation, review of labs/radiographs/records and discussion with patient and family if present  Critical care time spent: 35 minutes        All diagnosis and differential diagnosis have been reviewed; assessment and plan has been documented; I have personally reviewed the labs and test results that are presently available; I have reviewed the patients medication list; I have reviewed the consulting providers response and recommendations. I have reviewed or attempted to review medical records based upon their availability    All of the patient's questions have been  addressed and answered. Patient's is agreeable to the above stated plan. I will continue to monitor closely and make adjustments to medical management as  needed.  _____________________________________________________________________    Nutrition Status:    Radiology:  Echo  · The left ventricle is normal in size with concentric remodeling and   mildly decreased systolic function.  · The estimated ejection fraction is 42%.  · Regional wall motion abnormalities of the LAD and RCA territories.  · Grade I left ventricular diastolic dysfunction.  · Normal right ventricular size with normal right ventricular systolic   function.  · The estimated PA systolic pressure is 14 mmHg.     Cardiac catheterization    The estimated blood loss was none.    The procedure log was documented by Documenter: Tom Lewis RN and   verified by Luis Daniel Maza MD.    Date: 8/31/2022  Time: 4:40 PM  Preprocedure diagnosis-NSTEMI  Postprocedure diagnosis-Obstructive CAD  Estimated blood loss-5 cc  Tissue removal-none  Complications-none    Procedures performed:  1. Ultrasound-guided right radial access  2. Coronary angiography  3. Left ventricular hemodynamics  4. IFR LCX 0.97, not significant  5. PTA prox LAD 2.5 balloon  4. TR band access site hemostasis    Findings:  1. Left main-short, normal  2. Lad-99% prox, 80% Mid, Large diagonal occluded with circumflex   collaterals  3. LCX-50% mid, IFR not significant  4. RCA-dominant, 80% distal involving PDA and PLB  5. EDP 15 mmHg    Procedure detail:  Ultrasound-guided right radial access obtained.  Sheath inserted.    Vasodilators and heparin administered.  Tiger catheter used for left   ventricular hemodynamics.  Catheter used for selective left and right   coronary angiography.  Patient was found to have multivessel disease.  I   decided to proceed with IFR interrogation of the circumflex.  EBU 3.5   guide was utilized to cannulate the left main.  Artery was treated with IC   nitro.   Wire was normalized in the aorta.  Interrogation of the   circumflex was not significant, pullback to the guide was 1.  Given the   circumflex was not  significant I felt that if I could wire the diagonal I   would proceed with stenting of the LAD if not I would send for surgery   evaluation.  I placed corey blue wire into the LAD and pre-dilated the 99%   proximal LAD stenosis with a 2.5 balloon.  Subsequent angiography   demonstrated MARY ANNE 3 flow, residual 80% stenosis.  Attempts were made at   wiring the diagonal with a corey black but were unsuccessful.  Final   angiography demonstrated MARY ANNE 3 flow in the LAD with residual high-grade   stenosis,  of the large diagonal, collateral flow from the circumflex   to the diagonal.    Catheter was removed and a TR band was utilized for   access site hemostasis.    Plan:  1. Maximize medical management  2. Restart heparin in 2 hours   3. Consult CT surgery for bypass to the LAD, diagonal, PLB, PDA   4. If patient is turned down for surgery consider staged intervention to   LAD across the diagonal  5. Check out has been given the cardiology service and on-call Cardiology  6. CT surgery consult has been placed.    US Abdomen Limited  Narrative: EXAMINATION:  US ABDOMEN LIMITED    CLINICAL HISTORY:  transaminitis;    COMPARISON:  13 June 2019.    FINDINGS:  Grayscale, color and spectral doppler evaluation of the right upper quadrant.    No focal abnormality of limited visualized pancreas. Abdominal aorta is not seen.  Imaged portion of the IVC normal in caliber.    Liver is upper limit of normal in size.  There is mildly increased hepatic parenchymal echogenicity.  There is no focal liver lesion.  Normal hepatopetal flow is noted in the portal vein.    No gallstones. No significant gallbladder wall thickening or pericholecystic fluid.  The common bile duct is normal in caliber  and measures 3 mm.    Right kidney measures 9 cm in length. No hydronephrosis.  Impression: Suspect hepatic steatosis.    Electronically signed by: Raghav Her  Date:    08/31/2022  Time:    06:57      Bahman Riojas MD   09/01/2022

## 2022-09-02 PROBLEM — I20.89 STABLE ANGINA PECTORIS: Status: ACTIVE | Noted: 2022-09-02

## 2022-09-02 PROBLEM — I21.4 TYPE 1 NON-ST ELEVATION MYOCARDIAL INFARCTION (NSTEMI): Status: ACTIVE | Noted: 2022-09-02

## 2022-09-02 LAB
ABORH RETYPE: NORMAL
ALBUMIN SERPL-MCNC: 3.9 GM/DL (ref 3.4–4.8)
ALBUMIN/GLOB SERPL: 1.9 RATIO (ref 1.1–2)
ALP SERPL-CCNC: 48 UNIT/L (ref 40–150)
ALT SERPL-CCNC: 53 UNIT/L (ref 0–55)
APTT PPP: 117.6 SECONDS (ref 23.2–33.7)
APTT PPP: 42.3 SECONDS (ref 23.2–33.7)
APTT PPP: 78.2 SECONDS (ref 23.2–33.7)
AST SERPL-CCNC: 25 UNIT/L (ref 5–34)
BASOPHILS # BLD AUTO: 0.06 X10(3)/MCL (ref 0–0.2)
BASOPHILS NFR BLD AUTO: 0.6 %
BILIRUBIN DIRECT+TOT PNL SERPL-MCNC: 0.4 MG/DL
BUN SERPL-MCNC: 11.9 MG/DL (ref 8.4–25.7)
CALCIUM SERPL-MCNC: 9.4 MG/DL (ref 8.8–10)
CHLORIDE SERPL-SCNC: 107 MMOL/L (ref 98–107)
CO2 SERPL-SCNC: 21 MMOL/L (ref 23–31)
CREAT SERPL-MCNC: 0.75 MG/DL (ref 0.73–1.18)
EOSINOPHIL # BLD AUTO: 0.19 X10(3)/MCL (ref 0–0.9)
EOSINOPHIL NFR BLD AUTO: 2 %
ERYTHROCYTE [DISTWIDTH] IN BLOOD BY AUTOMATED COUNT: 13.8 % (ref 11.5–17)
GFR SERPLBLD CREATININE-BSD FMLA CKD-EPI: >60 MLS/MIN/1.73/M2
GLOBULIN SER-MCNC: 2.1 GM/DL (ref 2.4–3.5)
GLUCOSE SERPL-MCNC: 85 MG/DL (ref 82–115)
HCT VFR BLD AUTO: 40.5 % (ref 42–52)
HGB BLD-MCNC: 12.9 GM/DL (ref 14–18)
IMM GRANULOCYTES # BLD AUTO: 0.03 X10(3)/MCL (ref 0–0.04)
IMM GRANULOCYTES NFR BLD AUTO: 0.3 %
LYMPHOCYTES # BLD AUTO: 2.66 X10(3)/MCL (ref 0.6–4.6)
LYMPHOCYTES NFR BLD AUTO: 27.8 %
MAGNESIUM SERPL-MCNC: 2.2 MG/DL (ref 1.6–2.6)
MCH RBC QN AUTO: 29.5 PG (ref 27–31)
MCHC RBC AUTO-ENTMCNC: 31.9 MG/DL (ref 33–36)
MCV RBC AUTO: 92.7 FL (ref 80–94)
MONOCYTES # BLD AUTO: 0.85 X10(3)/MCL (ref 0.1–1.3)
MONOCYTES NFR BLD AUTO: 8.9 %
NEUTROPHILS # BLD AUTO: 5.8 X10(3)/MCL (ref 2.1–9.2)
NEUTROPHILS NFR BLD AUTO: 60.4 %
NRBC BLD AUTO-RTO: 0 %
PLATELET # BLD AUTO: 328 X10(3)/MCL (ref 130–400)
PMV BLD AUTO: 10.9 FL (ref 7.4–10.4)
POTASSIUM SERPL-SCNC: 4 MMOL/L (ref 3.5–5.1)
PROT SERPL-MCNC: 6 GM/DL (ref 5.8–7.6)
RBC # BLD AUTO: 4.37 X10(6)/MCL (ref 4.7–6.1)
SODIUM SERPL-SCNC: 138 MMOL/L (ref 136–145)
WBC # SPEC AUTO: 9.6 X10(3)/MCL (ref 4.5–11.5)

## 2022-09-02 PROCEDURE — 63600175 PHARM REV CODE 636 W HCPCS

## 2022-09-02 PROCEDURE — 21400001 HC TELEMETRY ROOM

## 2022-09-02 PROCEDURE — 85730 THROMBOPLASTIN TIME PARTIAL: CPT | Performed by: INTERNAL MEDICINE

## 2022-09-02 PROCEDURE — 94761 N-INVAS EAR/PLS OXIMETRY MLT: CPT

## 2022-09-02 PROCEDURE — 99223 PR INITIAL HOSPITAL CARE,LEVL III: ICD-10-PCS | Mod: ,,, | Performed by: THORACIC SURGERY (CARDIOTHORACIC VASCULAR SURGERY)

## 2022-09-02 PROCEDURE — 25000003 PHARM REV CODE 250: Performed by: INTERNAL MEDICINE

## 2022-09-02 PROCEDURE — 27000221 HC OXYGEN, UP TO 24 HOURS

## 2022-09-02 PROCEDURE — 99223 1ST HOSP IP/OBS HIGH 75: CPT | Mod: ,,, | Performed by: THORACIC SURGERY (CARDIOTHORACIC VASCULAR SURGERY)

## 2022-09-02 PROCEDURE — 36415 COLL VENOUS BLD VENIPUNCTURE: CPT | Performed by: NURSE PRACTITIONER

## 2022-09-02 PROCEDURE — 27000207 HC ISOLATION

## 2022-09-02 PROCEDURE — 80053 COMPREHEN METABOLIC PANEL: CPT | Performed by: NURSE PRACTITIONER

## 2022-09-02 PROCEDURE — 36415 COLL VENOUS BLD VENIPUNCTURE: CPT | Performed by: INTERNAL MEDICINE

## 2022-09-02 PROCEDURE — 85025 COMPLETE CBC W/AUTO DIFF WBC: CPT | Performed by: NURSE PRACTITIONER

## 2022-09-02 PROCEDURE — 83735 ASSAY OF MAGNESIUM: CPT | Performed by: NURSE PRACTITIONER

## 2022-09-02 RX ORDER — HYDROCODONE BITARTRATE AND ACETAMINOPHEN 500; 5 MG/1; MG/1
TABLET ORAL
Status: DISCONTINUED | OUTPATIENT
Start: 2022-09-02 | End: 2022-09-10 | Stop reason: HOSPADM

## 2022-09-02 RX ORDER — ATORVASTATIN CALCIUM 40 MG/1
40 TABLET, FILM COATED ORAL NIGHTLY
Status: DISCONTINUED | OUTPATIENT
Start: 2022-09-03 | End: 2022-09-10 | Stop reason: HOSPADM

## 2022-09-02 RX ADMIN — FAMOTIDINE 20 MG: 20 TABLET, FILM COATED ORAL at 09:09

## 2022-09-02 RX ADMIN — Medication 81 MG: at 10:09

## 2022-09-02 RX ADMIN — FAMOTIDINE 20 MG: 20 TABLET, FILM COATED ORAL at 10:09

## 2022-09-02 RX ADMIN — HEPARIN SODIUM 20 UNITS/KG/HR: 10000 INJECTION, SOLUTION INTRAVENOUS at 06:09

## 2022-09-02 RX ADMIN — HEPARIN SODIUM 23 UNITS/KG/HR: 10000 INJECTION, SOLUTION INTRAVENOUS at 11:09

## 2022-09-02 RX ADMIN — METOPROLOL SUCCINATE 12.5 MG: 25 TABLET, EXTENDED RELEASE ORAL at 10:09

## 2022-09-02 NOTE — PLAN OF CARE
09/02/22 1114   Discharge Assessment   Assessment Type Discharge Planning Assessment   Confirmed/corrected address, phone number and insurance Yes   Confirmed Demographics Correct on Facesheet   Source of Information patient   Lives With spouse   Do you expect to return to your current living situation? Yes   Do you have help at home or someone to help you manage your care at home? Yes   Prior to hospitilization cognitive status: Alert/Oriented   Current cognitive status: Alert/Oriented   Walking or Climbing Stairs Difficulty none   Dressing/Bathing Difficulty none   Equipment Currently Used at Home none   Readmission within 30 days? Yes   Patient currently being followed by outpatient case management? No   Do you currently have service(s) that help you manage your care at home? No   Do you take prescription medications? Yes   Do you have prescription coverage? Yes   Do you have any problems affording any of your prescribed medications? No   Is the patient taking medications as prescribed? yes   How do you get to doctors appointments? car, drives self   Are you on dialysis? No   Do you take coumadin? No   Discharge Plan A Home Health   Discharge Plan B Home with family   DME Needed Upon Discharge  none   Discharge Barriers Identified None

## 2022-09-02 NOTE — CONSULTS
CT SURGERY PROGRESS NOTE  Gustavo Cheung  67 y.o.  1955    Patients Procedure: Procedure(s) (LRB):  Left heart cath (Left)  Instantaneous Wave-Free Ratio (IFR) (N/A)  Angioplasty-coronary  Percutaneous coronary intervention (N/A)    Subjective  Interval History: Dr Pickering consulted for CABG eval    Review of Systems   Constitutional:         COVID positive   HENT: Negative.     Eyes: Negative.    Respiratory:  Positive for cough.    Cardiovascular:  Positive for chest pain.        CAD- CABG eval   Gastrointestinal: Negative.    Genitourinary: Negative.    Musculoskeletal: Negative.    Skin: Negative.    Neurological: Negative.    Endo/Heme/Allergies: Negative.      Medication List  Infusions   heparin (porcine) in D5W 20 Units/kg/hr (09/02/22 0632)     Scheduled   aspirin  81 mg Oral Daily    famotidine  20 mg Oral BID    metoprolol succinate  12.5 mg Oral Daily       Objective:  Recent Vitals:  Temp:  [97.3 °F (36.3 °C)-98.8 °F (37.1 °C)] 98.8 °F (37.1 °C)  Pulse:  [71-78] 78  Resp:  [12-20] 18  SpO2:  [94 %-97 %] 94 %  BP: (106-132)/(68-85) 107/73    Physical Exam  Constitutional:       Appearance: Normal appearance.   HENT:      Head: Normocephalic.      Mouth/Throat:      Mouth: Mucous membranes are moist.   Eyes:      Extraocular Movements: Extraocular movements intact.      Pupils: Pupils are equal, round, and reactive to light.   Cardiovascular:      Rate and Rhythm: Normal rate and regular rhythm.      Pulses: Normal pulses.   Pulmonary:      Effort: Pulmonary effort is normal.      Breath sounds: Normal breath sounds.   Abdominal:      General: Abdomen is flat. Bowel sounds are normal.   Musculoskeletal:         General: Normal range of motion.      Cervical back: Normal range of motion.   Skin:     General: Skin is warm.   Neurological:      General: No focal deficit present.      Mental Status: He is alert and oriented to person, place, and time.   Psychiatric:         Mood and Affect: Mood  normal.        I/O last 24 hrs:  Intake/Output - Last 3 Shifts         08/31 0700  09/01 0659 09/01 0700  09/02 0659 09/02 0700  09/03 0659    P.O.  1200     Total Intake(mL/kg)  1200 (16.3)     Urine (mL/kg/hr) 400 (0.2) 575 (0.3) 850 (4.8)    Total Output 400 575 850    Net -400 +625 -850           Urine Occurrence 2 x 6 x     Stool Occurrence 0 x              Labs  BMP:   Recent Labs   Lab 09/02/22  0401      K 4.0   CO2 21*   BUN 11.9   CREATININE 0.75   CALCIUM 9.4   MG 2.20     CBC:   Recent Labs   Lab 09/02/22  0401   WBC 9.6   RBC 4.37*   HGB 12.9*   HCT 40.5*      MCV 92.7   MCH 29.5   MCHC 31.9*     CMP:   Recent Labs   Lab 09/02/22  0401   CALCIUM 9.4   ALBUMIN 3.9      K 4.0   CO2 21*   BUN 11.9   CREATININE 0.75   ALKPHOS 48   ALT 53   AST 25   BILITOT 0.4         Imaging:   CXR: No results found in the last 24 hours.        ASSESSMENT/PLAN:  This is a 67-year-old male who was admitted to an [facility] hospital with substernal chest discomfort.  A left heart catheterization was performed that showed a high-grade proximal lesion in the left anterior descending.  There is a  of the 1st diagonal  and  the distal circumflex is small..  The right coronary has minimal luminal disease however at its bifurcation to the posterior descending and posterolateral, there is another high-grade tubular stenosis.  Left ventricular function is preserved.  The patient was tested for COVID and has come up positive.  He is presently stable and pain-free.       With the patient being COVID positive, we are disinclined to take the patient directly to surgery until he is well recovered.  I would prefer that he be medically stabilized and referred for surgery 2 weeks after he turns COVID negative.  However, if his symptoms require and if the Cardiology Service prefers that he be treated sooner, percutaneous solution should be entertained.          Case and plan of care discussed with MD Ross Gutierrez,  PAVEL

## 2022-09-02 NOTE — H&P (VIEW-ONLY)
[facility]  Cardiothoracic Surgery  Consult Note    Patient Name: Gustavo Cheung  MRN: [id number]  Admission Date: 8/30/2022  Attending Physician: Bahman Riojas MD  Referring Provider: Kemar Burns MD    Patient information was obtained from patient, past medical records, ER records, and primary team.     Inpatient consult to Cardiothoracic Surgery  Consult performed by: Adolfo Coleman MD  Consult ordered by: SHELBY Coker  Reason for consult: Second opinion for surgical revacularization in COVID + patient with chest pain syndrome currently asymptomatic  Assessment/Recommendations: This is a 67-year-old male who was admitted to an [facility] hospital with substernal chest discomfort.  A left heart catheterization was performed that showed a high-grade proximal lesion in the left anterior descending with moderately depressed LVF.   The right coronary has minimal luminal disease however at its bifurcation to the posterior descending and posterolateral, there is another high-grade stenosis.   The patient was tested for COVID and has come up positive.  He is presently stable and pain-free.       Usually with the patient being COVID positive, would defer surgery until he has recovered from his COVID syndrome if at all possible, as the risk of COVID related myocardial dysfunction is quite high.  Fortunately, the patient has minimal COVID symptoms.  Considering the degree of proximal LAD stenosis,  I would recommend surgery this admission.  Also recommend IV heparin drip in the interim.  Will discuss with cardiology.  Please repeat COVID assay Sunday as this will be 5 days from his last positive test.            Subjective:     Chief Complaint/Reason for Admission: Chest pain syndrome    History of Present Illness: Mr. Cheung is a 66 year old male who presented to [place] ED on 8.30.22 with complaints of chest pain that started one day prior. He describes the pain as  pressure-like substernal, nonradiating with an association of diaphoresis. He reports that the pain lasted for about an hour and resolved with nitro SL provided by EMS. Upon arrival to the ED, his initial troponin was 1.078 and AST//104. He was also noted to be COVID positive. His UDS was positive for cannabis. His EKG demonstrated SR w/ T-wave inversion in lateral leads. He denies current CP, SOB, or palps. [facility] has been consulted to further evaluate the patient's CP and elevated troponin. And I have been asked to render a second opinion about treatment.    No current facility-administered medications on file prior to encounter.     No current outpatient medications on file prior to encounter.       Review of patient's allergies indicates:  No Known Allergies    History reviewed. No pertinent past medical history.  Past Surgical History:   Procedure Laterality Date    HERNIA REPAIR      LEFT HEART CATHETERIZATION Left 2022    Procedure: Left heart cath;  Surgeon: Luis Daniel Maza MD;  Location: [facility] CATH LAB;  Service: Cardiology;  Laterality: Left;    PERCUTANEOUS TRANSLUMINAL BALLOON ANGIOPLASTY OF CORONARY ARTERY  2022    Procedure: Angioplasty-coronary;  Surgeon: Luis Daniel Maza MD;  Location: [facility] CATH LAB;  Service: Cardiology;;     Family History    None       Tobacco Use    Smoking status: Former     Types: Cigarettes     Quit date: 2016     Years since quittin.0    Smokeless tobacco: Never   Substance and Sexual Activity    Alcohol use: Not Currently    Drug use: Never    Sexual activity: Not Currently     Birth control/protection: Abstinence     Review of Systems   Constitutional:  Negative for activity change, chills, diaphoresis and fever.   HENT:  Negative for congestion, dental problem, drooling, mouth sores, sore throat, trouble swallowing and voice change.    Eyes: Negative.    Respiratory:  Negative for apnea, cough, choking, chest tightness, shortness of breath,  wheezing and stridor.    Cardiovascular:  Negative for chest pain, palpitations and leg swelling.   Gastrointestinal:  Negative for abdominal distention, abdominal pain, blood in stool, nausea and vomiting.   Endocrine: Negative.    Musculoskeletal:  Negative for arthralgias, back pain, gait problem, neck pain and neck stiffness.   Skin:  Negative for color change, pallor, rash and wound.   Allergic/Immunologic: Negative.    Neurological:  Negative for dizziness, seizures, syncope, facial asymmetry, weakness, light-headedness and headaches.   Hematological:  Negative for adenopathy.   Psychiatric/Behavioral:  Negative for agitation and hallucinations.    Objective:     Vital Signs (Most Recent):  Temp: 98.7 °F (37.1 °C) (09/02/22 1207)  Pulse: 74 (09/02/22 1207)  Resp: 18 (09/02/22 0849)  BP: 120/77 (09/02/22 1207)  SpO2: 97 % (09/02/22 1207) Vital Signs (24h Range):  Temp:  [97.3 °F (36.3 °C)-98.8 °F (37.1 °C)] 98.7 °F (37.1 °C)  Pulse:  [71-78] 74  Resp:  [12-20] 18  SpO2:  [94 %-97 %] 97 %  BP: (106-128)/(68-80) 120/77     Weight: 73.4 kg (161 lb 13.1 oz)  Body mass index is 27.78 kg/m².    SpO2: 97 %  O2 Device (Oxygen Therapy): nasal cannula     Intake/Output - Last 3 Shifts         08/31 0700 09/01 0659 09/01 0700 09/02 0659 09/02 0700  09/03 0659    P.O.  1200     Total Intake(mL/kg)  1200 (16.3)     Urine (mL/kg/hr) 400 (0.2) 575 (0.3) 850 (2)    Total Output 400 575 850    Net -400 +625 -850           Urine Occurrence 2 x 6 x     Stool Occurrence 0 x               Lines/Drains/Airways       Peripheral Intravenous Line  Duration                  Peripheral IV - Single Lumen 08/30/22 1214 20 G Right Antecubital 3 days                     STS Risk Score: 0.6/5%    Physical Exam  Vitals and nursing note reviewed.   Constitutional:       General: He is not in acute distress.     Appearance: Normal appearance.   HENT:      Head: Normocephalic and atraumatic.      Nose: Nose normal. No congestion.       Mouth/Throat:      Mouth: Mucous membranes are moist.   Eyes:      General: No scleral icterus.     Extraocular Movements: Extraocular movements intact.      Conjunctiva/sclera: Conjunctivae normal.      Pupils: Pupils are equal, round, and reactive to light.   Neck:      Thyroid: No thyroid mass or thyromegaly.      Vascular: No carotid bruit or JVD.   Cardiovascular:      Rate and Rhythm: Normal rate and regular rhythm.      Pulses: Normal pulses.           Carotid pulses are 2+ on the right side and 2+ on the left side.       Radial pulses are 2+ on the right side and 2+ on the left side.        Femoral pulses are 2+ on the right side and 2+ on the left side.       Dorsalis pedis pulses are 2+ on the right side and 2+ on the left side.        Posterior tibial pulses are 2+ on the right side and 2+ on the left side.      Heart sounds: No murmur heard.    No friction rub. No gallop.   Pulmonary:      Effort: Pulmonary effort is normal. No respiratory distress.      Breath sounds: Normal breath sounds. No stridor. No wheezing, rhonchi or rales.   Chest:      Chest wall: No tenderness.   Abdominal:      General: Abdomen is flat. Bowel sounds are normal. There is no distension.      Palpations: Abdomen is soft. There is no hepatomegaly, splenomegaly, mass or pulsatile mass.      Tenderness: There is no abdominal tenderness. There is no rebound.   Musculoskeletal:         General: No swelling. Normal range of motion.      Cervical back: Normal range of motion. No rigidity or tenderness.      Right lower leg: No edema.      Left lower leg: No edema.   Lymphadenopathy:      Cervical: No cervical adenopathy.      Upper Body:      Right upper body: No supraclavicular or axillary adenopathy.      Left upper body: No supraclavicular or axillary adenopathy.   Skin:     General: Skin is warm and dry.   Neurological:      General: No focal deficit present.      Mental Status: He is alert and oriented to person, place, and time.  Mental status is at baseline.      Cranial Nerves: No cranial nerve deficit.      Sensory: No sensory deficit.      Motor: No weakness.      Gait: Gait normal.   Psychiatric:         Mood and Affect: Mood normal.       Significant Labs:  All pertinent labs from the last 24 hours have been reviewed.    Significant Diagnostics:  I have reviewed and interpreted all pertinent imaging results/findings within the past 24 hours.    Assessment/Plan:     NYHA Score: NYHA I: cardiac disease, but without resulting limitations of physical activity    Active Diagnoses:    Diagnosis Date Noted POA    PRINCIPAL PROBLEM:  Type 1 non-ST elevation myocardial infarction (NSTEMI) [I21.4] 09/02/2022 Yes      Problems Resolved During this Admission:       Thank you for your consult. I will follow-up with patient. Please contact us if you have any additional questions.    Adolfo Coleman MD  Cardiothoracic Surgery  [facility]

## 2022-09-02 NOTE — PROGRESS NOTES
[facility] Medicine Progress Note        Chief Complaint: Inpatient Follow-up for NSTEMI    HPI: This is a 66-year-old male with medical history of former cigarette smoker quit 2016 present to [facility] ED with complaint of chest pain that started  today 8/30/2022 around 10:30 AM.  Described the pain as pressure-like substernal, nonradiating and associated with diaphoresis, pain lasted for about 1 hour and improved with sublingual nitro spray given with EMS and again in the emergency room.  On arrival he was noted to be hemodynamically stable and afebrile and saturating above 95% on room air. EKG  sinus rhythm and showed T-wave inversion lateral leads. Chest x-ray showed normal cardiac silhouette and no parenchymal lung opacities. Labs notable for troponin 1.078,  elevated transaminases and COVID-19 positive.  He was given nitro paste and currently is chest pain-free.  Cardiology consulted and transferred to [facility] and referred to hospital medicine service for further evaluation and management.    Interval Hx:   Sitting in bed, feeling okay.  Discussed that surgery will be next week probably if his COVID is negative.  Discuss that he cannot undergo surgery while he is positive for COVID.  Patient asked if he can go home and come back for surgery informed patient its is rather impossible given he had an MI and right now with his blockages, he is on heparin drip to keep his blood thin so we can maintain blood supply in the tightest spots even.  Verbalized understanding  Patient still have a lot of perioperative questions.  I did my best to give him all the information but in from home him that the cardiothoracic surgeon will explain everything  No family at bedside  Case discussed with patient's nurse on the floor, case was also discussed with Xenia and with Dr David    Objective/physical exam:  General: In no acute distress, afebrile  Chest: Clear to auscultation  bilaterally  Heart: RRR, +S1, S2, no appreciable murmur  Abdomen: Soft, nontender, BS +  MSK: Warm, no lower extremity edema, no clubbing or cyanosis  Neurologic: Alert and oriented x4, Cranial nerve II-XII intact, Strength 5/5 in all 4 extremities    VITAL SIGNS: 24 HRS MIN & MAX LAST   Temp  Min: 97.3 °F (36.3 °C)  Max: 98.5 °F (36.9 °C) 98.3 °F (36.8 °C)   BP  Min: 106/68  Max: 132/85 106/68   Pulse  Min: 71  Max: 75  74   Resp  Min: 12  Max: 20 12   SpO2  Min: 95 %  Max: 97 % 95 %       Recent Labs   Lab 08/31/22  0344 09/01/22  0240 09/02/22  0401   WBC 8.7 8.1 9.6   RBC 4.25* 4.47* 4.37*   HGB 12.5* 13.4* 12.9*   HCT 39.3* 41.4* 40.5*   MCV 92.5 92.6 92.7   MCH 29.4 30.0 29.5   MCHC 31.8* 32.4* 31.9*   RDW 13.8 14.0 13.8    301 328   MPV 11.5* 11.3* 10.9*       Recent Labs   Lab 08/31/22 0344 09/01/22  0300 09/02/22  0401    138 138   K 4.2 4.4 4.0   CO2 21* 20* 21*   BUN 11.2 8.0* 11.9   CREATININE 0.77 0.71* 0.75   CALCIUM 9.3 9.3 9.4   MG 2.20 2.30 2.20   ALBUMIN 3.8 3.8 3.9   ALKPHOS 50 49 48   * 77* 53   AST 64* 47* 25   BILITOT 0.3 0.3 0.4          Microbiology Results (last 7 days)       ** No results found for the last 168 hours. **             See below for Radiology    Scheduled Med:   aspirin  81 mg Oral Daily    famotidine  20 mg Oral BID    metoprolol succinate  12.5 mg Oral Daily        Continuous Infusions:   heparin (porcine) in D5W 20 Units/kg/hr (09/02/22 0632)        PRN Meds:  acetaminophen, acetaminophen, acetaminophen, aluminum-magnesium hydroxide-simethicone, benzonatate, dextromethorphan-guaiFENesin  mg/5 ml, fentaNYL, heparin (porcine), heparin (PORCINE), heparin (PORCINE), iopamidoL, LIDOcaine HCL 10 mg/ml (1%), melatonin, midazolam, nitroGLYCERIN, nitroGLYCERIN, ondansetron, ondansetron, polyethylene glycol, prochlorperazine, senna-docusate 8.6-50 mg, simethicone, sodium chloride 0.9%, sodium chloride 0.9%, verapamiL       Assessment/Plan:  NSTEMI -  type  1- on heparin  New acute systolic heart failure with EF 42% and grade 1 diastolic dysfunction  COVID 19 postive- no respiratory symptoms at this time  Hyperlipidemia  Transaminitis- resolved       Currently chest pain-free, no shortness breath and saturating  above 95% on room air  Troponin trending up  1.078 --> 2.866--> 4.00--> 3.87  Transthoracic ECHO - The estimated ejection fraction is 42%. Regional wall motion abnormalities of the LAD and RCA territories. Grade I left ventricular diastolic dysfunction.  8/31- Underwent LHC --> multivessel disease, consulted cardiothoracic surgery Dr Pickering for bypass to the LAD, diagonal, PLB, PDA --> reviewed his note, he would prefer to wait 2 weeks post negative COVID test for CABG.  Case personally discussed with cardiology Dr. David, due to the severity of the patient's CAD we all agreed for a 2nd opinion from Dr Jared Coleman evaluated the pt, recommended surgery next week on this admission, if repeat COVID on Sunday is negative, given it will be 5 days from the diagnosis and given the fact he is completely asymptomatic   Greatly appreciate his input  Will order COVID test for Sunday  Received Aspirin 324 mg x1 on admission and now 81mg daily  Received Enoxaparin 1 mg/kg SC Q12h and now changed to heparin drip post LHC   Cont Metoprolol succinate 12.5 mg daily  Liver enzyme normalized, will start atorvastatin tomorrow  A1c 5.9 and lipid panel with .   Abdominal US--> hepatic steatosis   Hepatitis viral panel-nonreactive   HIV- nonreactive  UDS positive for THC   Maintain covid isolation and precautions per CDC guideline  D-dimer was normal  CRP 2.19 and ferritin 69, both wnl  Morning cbc, cmp ordered       VTE prophylaxis: Heparin drip    Patient condition:  Guarded    Anticipated discharge and Disposition:   TBD    Critical care note:  Critical care diagnosis:  NSTEMI type 1 on heparin drip  Critical care interventions: Hands-on evaluation, review of  labs/radiographs/records and discussion with patient and family if present  Critical care time spent: 35 minutes        All diagnosis and differential diagnosis have been reviewed; assessment and plan has been documented; I have personally reviewed the labs and test results that are presently available; I have reviewed the patients medication list; I have reviewed the consulting providers response and recommendations. I have reviewed or attempted to review medical records based upon their availability    All of the patient's questions have been  addressed and answered. Patient's is agreeable to the above stated plan. I will continue to monitor closely and make adjustments to medical management as needed.  _____________________________________________________________________    Nutrition Status:    Radiology:  Cardiac catheterization    The estimated blood loss was none.    The procedure log was documented by Documenter: Tom Lewis RN and   verified by Luis Daniel Maza MD.    Date: 8/31/2022  Time: 4:40 PM  Preprocedure diagnosis-NSTEMI  Postprocedure diagnosis-Obstructive CAD  Estimated blood loss-5 cc  Tissue removal-none  Complications-none    Procedures performed:  1. Ultrasound-guided right radial access  2. Coronary angiography  3. Left ventricular hemodynamics  4. IFR LCX 0.97, not significant  5. PTA prox LAD 2.5 balloon  4. TR band access site hemostasis    Findings:  1. Left main-short, normal  2. Lad-99% prox, 80% Mid, Large diagonal occluded with circumflex   collaterals  3. LCX-50% mid, IFR not significant  4. RCA-dominant, 80% distal involving PDA and PLB  5. EDP 15 mmHg    Procedure detail:  Ultrasound-guided right radial access obtained.  Sheath inserted.    Vasodilators and heparin administered.  Tiger catheter used for left   ventricular hemodynamics.  Catheter used for selective left and right   coronary angiography.  Patient was found to have multivessel disease.  I   decided to proceed with IFR  interrogation of the circumflex.  EBU 3.5   guide was utilized to cannulate the left main.  Artery was treated with IC   nitro.   Wire was normalized in the aorta.  Interrogation of the   circumflex was not significant, pullback to the guide was 1.  Given the   circumflex was not significant I felt that if I could wire the diagonal I   would proceed with stenting of the LAD if not I would send for surgery   evaluation.  I placed corey blue wire into the LAD and pre-dilated the 99%   proximal LAD stenosis with a 2.5 balloon.  Subsequent angiography   demonstrated MARY ANNE 3 flow, residual 80% stenosis.  Attempts were made at   wiring the diagonal with a corey black but were unsuccessful.  Final   angiography demonstrated MARY ANNE 3 flow in the LAD with residual high-grade   stenosis,  of the large diagonal, collateral flow from the circumflex   to the diagonal.    Catheter was removed and a TR band was utilized for   access site hemostasis.    Plan:  1. Maximize medical management  2. Restart heparin in 2 hours   3. Consult CT surgery for bypass to the LAD, diagonal, PLB, PDA   4. If patient is turned down for surgery consider staged intervention to   LAD across the diagonal  5. Check out has been given the cardiology service and on-call Cardiology  6. CT surgery consult has been placed.        Bahman Riojas MD   09/02/2022

## 2022-09-02 NOTE — PROGRESS NOTES
[facility] - [facility] Medical Telemetry  Cardiology  Progress Note    Patient Name: Gustavo Cheung  MRN: [id number]  Admission Date: 8/30/2022  Hospital Length of Stay: 2 days  Code Status: Full Code   Attending Physician: Bahman Riojas MD   Primary Care Physician: No primary care provider on file.  Expected Discharge Date:   Principal Problem:Type 1 non-ST elevation myocardial infarction (NSTEMI)    Subjective:     Brief HPI:  Mr. Cheung is a 66 year old male who is unknown to [facility]. [facility] with complaints of chest pain that started yesterday. He describes the pain as pressure-like substernal, nonradiating with an association of diaphoresis. He reports that the pain lasted for about an hour and resolved with nitro SL provided by EMS. Upon arrival to the ED, his initial troponin was 1.078 and AST//104. He was also noted to be COVID positive. His UDS was positive for cannabis. His EKG demonstrated SR w/ T-wave inversion in lateral leads. He denies current CP, SOB, or palps. [facility] has been consulted to further evaluate the patient's CP and elevated troponin.     9.2.22: NAD. Denies CP, SOB, or palps. Heparin drip per protocol.     Hospital Course:  PMH: pre DM  PSH: hernia repair, right thigh & tendon repair surgery  Family History: Noncontributory  Social History: Ex smoker (quit in 2016). Reports occasional alcohol use (less than once/month). Denies drug use.      Previous Diagnostics:  [facility] 8.31.22  Left main-short, normal  Lad-99% prox, 80% Mid, Large diagonal occluded with circumflex collaterals  LCX-50% mid, IFR not significant  RCA-dominant, 80% distal involving PDA and PLB  EDP 15 mmHg     Maximize medical managemen  Consult CT surgery for bypass to the LAD, diagonal, PLB, PDA   If patient is turned down for surgery consider staged intervention to LAD across the diagonal  Check out has been given the cardiology service and on-call Cardiology  CT surgery consult  has been placed      Echo 8.31.22  The left ventricle is normal in size with concentric remodeling and mildly decreased systolic function.  The estimated ejection fraction is 42%.  Regional wall motion abnormalities of the LAD and RCA territories.  Grade I left ventricular diastolic dysfunction.  Normal right ventricular size with normal right ventricular systolic function.  The estimated PA systolic pressure is 14 mmHg    Review of Systems   Cardiovascular: Negative.  Negative for chest pain and palpitations.   Respiratory: Negative.  Negative for shortness of breath.    All other systems reviewed and are negative.    Objective:     Vital Signs (Most Recent):  Temp: 98.7 °F (37.1 °C) (09/02/22 1207)  Pulse: 74 (09/02/22 1207)  Resp: 18 (09/02/22 0849)  BP: 120/77 (09/02/22 1207)  SpO2: 97 % (09/02/22 1207)   Vital Signs (24h Range):  Temp:  [97.3 °F (36.3 °C)-98.8 °F (37.1 °C)] 98.7 °F (37.1 °C)  Pulse:  [71-78] 74  Resp:  [12-20] 18  SpO2:  [94 %-97 %] 97 %  BP: (106-128)/(68-80) 120/77     Weight: 73.4 kg (161 lb 13.1 oz)  Body mass index is 27.78 kg/m².    SpO2: 97 %  O2 Device (Oxygen Therapy): nasal cannula      Intake/Output Summary (Last 24 hours) at 9/2/2022 1314  Last data filed at 9/2/2022 0700  Gross per 24 hour   Intake 1200 ml   Output 850 ml   Net 350 ml         Lines/Drains/Airways       Peripheral Intravenous Line  Duration                  Peripheral IV - Single Lumen 08/30/22 1214 20 G Right Antecubital 3 days                    Significant Labs:   Recent Results (from the past 72 hour(s))   COVID/FLU A&B PCR    Collection Time: 08/30/22  1:25 PM   Result Value Ref Range    Influenza A PCR Not Detected Not Detected    Influenza B PCR Not Detected Not Detected    SARS-CoV-2 PCR Detected (A) Not Detected   Comprehensive metabolic panel    Collection Time: 08/30/22  1:37 PM   Result Value Ref Range    Sodium Level 142 136 - 145 mmol/L    Potassium Level 4.4 3.5 - 5.1 mmol/L    Chloride 110 (H) 98 -  107 mmol/L    Carbon Dioxide 23 23 - 31 mmol/L    Glucose Level 111 82 - 115 mg/dL    Blood Urea Nitrogen 13.0 8.4 - 25.7 mg/dL    Creatinine 0.78 0.73 - 1.18 mg/dL    Calcium Level Total 9.3 8.8 - 10.0 mg/dL    Protein Total 6.3 5.8 - 7.6 gm/dL    Albumin Level 3.7 3.4 - 4.8 gm/dL    Globulin 2.6 2.4 - 3.5 gm/dL    Albumin/Globulin Ratio 1.4 1.1 - 2.0 ratio    Bilirubin Total 0.4 <=1.5 mg/dL    Alkaline Phosphatase 51 40 - 150 unit/L    Alanine Aminotransferase 141 (H) 0 - 55 unit/L    Aspartate Aminotransferase 112 (H) 5 - 34 unit/L    eGFR >60 mls/min/1.73/m2   D dimer, quantitative    Collection Time: 08/30/22  1:37 PM   Result Value Ref Range    D-Dimer 0.44 0.00 - 0.50 ug/mL FEU   Lipase    Collection Time: 08/30/22  1:37 PM   Result Value Ref Range    Lipase Level 125 (H) <=60 U/L   Troponin I    Collection Time: 08/30/22  1:37 PM   Result Value Ref Range    Troponin-I 1.078 (H) 0.000 - 0.045 ng/mL   CBC with Differential    Collection Time: 08/30/22  1:37 PM   Result Value Ref Range    WBC 7.2 4.5 - 11.5 x10(3)/mcL    RBC 4.04 (L) 4.70 - 6.10 x10(6)/mcL    Hgb 11.8 (L) 14.0 - 18.0 gm/dL    Hct 36.6 (L) 42.0 - 52.0 %    MCV 90.6 80.0 - 94.0 fL    MCH 29.2 27.0 - 31.0 pg    MCHC 32.2 (L) 33.0 - 36.0 mg/dL    RDW 13.6 11.5 - 17.0 %    Platelet 291 130 - 400 x10(3)/mcL    MPV 10.5 (H) 7.4 - 10.4 fL    Neut % 73.0 %    Lymph % 15.9 %    Mono % 9.2 %    Eos % 1.0 %    Basophil % 0.6 %    Lymph # 1.14 0.6 - 4.6 x10(3)/mcL    Neut # 5.2 2.1 - 9.2 x10(3)/mcL    Mono # 0.66 0.1 - 1.3 x10(3)/mcL    Eos # 0.07 0 - 0.9 x10(3)/mcL    Baso # 0.04 0 - 0.2 x10(3)/mcL    IG# 0.02 0 - 0.04 x10(3)/mcL    IG% 0.3 %   Drug Screen, Urine    Collection Time: 08/30/22  2:02 PM   Result Value Ref Range    Amphetamines, Urine Negative Negative    Barbituates, Urine Negative Negative    Benzodiazepine, Urine Negative Negative    Cannabinoids, Urine Positive (A) Negative    Cocaine, Urine Negative Negative    Fentanyl, Urine Negative  Negative    MDMA, Urine Negative Negative    Opiates, Urine Negative Negative    Phencyclidine, Urine Negative Negative    pH, Urine 5.5 3.0 - 11.0    Specific Gravity, Urine Auto 1.025 1.001 - 1.035   Urinalysis, Reflex to Urine Culture Urine, Clean Catch    Collection Time: 08/30/22  2:02 PM    Specimen: Urine   Result Value Ref Range    Color, UA Yellow Yellow, Colorless, Other, Clear    Appearance, UA Clear Clear    Specific Gravity, UA 1.025     pH, UA 5.5 5.0, 5.5, 6.0, 6.5, 7.0, 7.5, 8.0, 8.5    Protein, UA 30 (A) Negative, 300  mg/dL    Glucose, UA Negative Negative, Normal mg/dL    Ketones, UA Negative Negative, +1, +2, +3, +4, +5, >=160, >=80 mg/dL    Blood, UA Negative Negative unit/L    Bilirubin, UA Negative Negative mg/dL    Urobilinogen, UA 0.2 0.2, 1.0, Normal mg/dL    Nitrites, UA Negative Negative    Leukocyte Esterase, UA Negative Negative, 75  unit/L   Urinalysis, Microscopic    Collection Time: 08/30/22  2:02 PM   Result Value Ref Range    Bacteria, UA None Seen None Seen, Rare, Occasional /HPF    Mucous, UA Small (A) None Seen /LPF    RBC, UA None Seen None Seen, 0-2, 3-5, 0-5 /HPF    WBC, UA 0-2 None Seen, 0-2, 3-5, 0-5 /HPF    Squamous Epithelial Cells, UA Rare None Seen, Rare, Occasional, Occ /HPF   Troponin I    Collection Time: 08/30/22 11:37 PM   Result Value Ref Range    Troponin-I 2.866 (H) 0.000 - 0.045 ng/mL   Hemoglobin A1C    Collection Time: 08/30/22 11:37 PM   Result Value Ref Range    Hemoglobin A1c 5.9 <=7.0 %    Estimated Average Glucose 122.6 mg/dL   Lipid Panel    Collection Time: 08/30/22 11:37 PM   Result Value Ref Range    Cholesterol Total 180 <=200 mg/dL    HDL Cholesterol 41 35 - 60 mg/dL    Triglyceride 156 (H) 34 - 140 mg/dL    Cholesterol/HDL Ratio 4 0 - 5    Very Low Density Lipoprotein 31     LDL Cholesterol 108.00 50.00 - 140.00 mg/dL   CRP, High Sensitivity    Collection Time: 08/30/22 11:37 PM   Result Value Ref Range    C-Reactive Protein High Sensitivity 2.19  <=5.00 mg/L   Ferritin    Collection Time: 08/30/22 11:37 PM   Result Value Ref Range    Ferritin Level 69.62 21.81 - 274.66 ng/mL   BNP    Collection Time: 08/30/22 11:37 PM   Result Value Ref Range    Natriuretic Peptide 191.8 (H) <=100.0 pg/mL   CK    Collection Time: 08/30/22 11:37 PM   Result Value Ref Range    Creatine Kinase 243 (H) 30 - 200 U/L   HIV 1/2 Ag/Ab (4th Gen)    Collection Time: 08/30/22 11:37 PM   Result Value Ref Range    HIV Nonreactive Nonreactive   Hepatitis Panel, Acute    Collection Time: 08/30/22 11:37 PM   Result Value Ref Range    Hepatitis A IgM Nonreactive Nonreactive    Hepatitis B Core IgM Nonreactive Nonreactive    Hepatitis B Surface Antigen Nonreactive Nonreactive    Hepatitis C Antibody Nonreactive Nonreactive   Pathologist Interpretation    Collection Time: 08/30/22 11:37 PM   Result Value Ref Range    Pathology Review       No serological evidence of recent or past hepatitis A, B, or C infection.    Amita Cuevas MD     Troponin I    Collection Time: 08/31/22  3:44 AM   Result Value Ref Range    Troponin-I 4.003 (H) 0.000 - 0.045 ng/mL   Comprehensive Metabolic Panel    Collection Time: 08/31/22  3:44 AM   Result Value Ref Range    Sodium Level 140 136 - 145 mmol/L    Potassium Level 4.2 3.5 - 5.1 mmol/L    Chloride 110 (H) 98 - 107 mmol/L    Carbon Dioxide 21 (L) 23 - 31 mmol/L    Glucose Level 95 82 - 115 mg/dL    Blood Urea Nitrogen 11.2 8.4 - 25.7 mg/dL    Creatinine 0.77 0.73 - 1.18 mg/dL    Calcium Level Total 9.3 8.8 - 10.0 mg/dL    Protein Total 6.0 5.8 - 7.6 gm/dL    Albumin Level 3.8 3.4 - 4.8 gm/dL    Globulin 2.2 (L) 2.4 - 3.5 gm/dL    Albumin/Globulin Ratio 1.7 1.1 - 2.0 ratio    Bilirubin Total 0.3 <=1.5 mg/dL    Alkaline Phosphatase 50 40 - 150 unit/L    Alanine Aminotransferase 104 (H) 0 - 55 unit/L    Aspartate Aminotransferase 64 (H) 5 - 34 unit/L    eGFR >60 mls/min/1.73/m2   Magnesium    Collection Time: 08/31/22  3:44 AM   Result Value Ref Range     Magnesium Level 2.20 1.60 - 2.60 mg/dL   Phosphorus    Collection Time: 08/31/22  3:44 AM   Result Value Ref Range    Phosphorus Level 3.8 2.3 - 4.7 mg/dL   CBC with Differential    Collection Time: 08/31/22  3:44 AM   Result Value Ref Range    WBC 8.7 4.5 - 11.5 x10(3)/mcL    RBC 4.25 (L) 4.70 - 6.10 x10(6)/mcL    Hgb 12.5 (L) 14.0 - 18.0 gm/dL    Hct 39.3 (L) 42.0 - 52.0 %    MCV 92.5 80.0 - 94.0 fL    MCH 29.4 27.0 - 31.0 pg    MCHC 31.8 (L) 33.0 - 36.0 mg/dL    RDW 13.8 11.5 - 17.0 %    Platelet 313 130 - 400 x10(3)/mcL    MPV 11.5 (H) 7.4 - 10.4 fL    Neut % 62.0 %    Lymph % 26.7 %    Mono % 8.3 %    Eos % 2.2 %    Basophil % 0.6 %    Lymph # 2.32 0.6 - 4.6 x10(3)/mcL    Neut # 5.4 2.1 - 9.2 x10(3)/mcL    Mono # 0.72 0.1 - 1.3 x10(3)/mcL    Eos # 0.19 0 - 0.9 x10(3)/mcL    Baso # 0.05 0 - 0.2 x10(3)/mcL    IG# 0.02 0 - 0.04 x10(3)/mcL    IG% 0.2 %    NRBC% 0.0 %   Troponin I    Collection Time: 08/31/22 11:07 AM   Result Value Ref Range    Troponin-I 3.873 (H) 0.000 - 0.045 ng/mL   Echo    Collection Time: 08/31/22  3:32 PM   Result Value Ref Range    BSA 1.75 m2    TDI SEPTAL 0.06 m/s    LV LATERAL E/E' RATIO 10.67 m/s    LV SEPTAL E/E' RATIO 10.67 m/s    Right Atrial Pressure (from IVC) 8 mmHg    EF 42 %    Left Ventricular Outflow Tract Mean Velocity 0.47 cm/s    Left Ventricular Outflow Tract Mean Gradient 1.00 mmHg    TDI LATERAL 0.06 m/s    LVIDd 4.74 3.5 - 6.0 cm    IVS 0.86 0.6 - 1.1 cm    Posterior Wall 1.04 0.6 - 1.1 cm    LVIDs 3.55 2.1 - 4.0 cm    FS 25 28 - 44 %    LV mass 155.57 g    LA size 3.50 cm    RVDD 3.25 cm    TAPSE 1.61 cm    Left Ventricle Relative Wall Thickness 0.44 cm    AV mean gradient 4 mmHg    AV valve area 1.70 cm2    AV Velocity Ratio 0.53     AV index (prosthetic) 0.54     MV mean gradient 1 mmHg    MV valve area p 1/2 method 3.44 cm2    MV valve area by continuity eq 2.05 cm2    E/A ratio 0.72     Mean e' 0.06 m/s    E wave deceleration time 201.00 msec    LVOT diameter 2.00  cm    LVOT area 3.1 cm2    LVOT peak dick 0.73 m/s    LVOT peak VTI 13.50 cm    Ao peak dick 1.37 m/s    Ao VTI 25.0 cm    LVOT stroke volume 42.39 cm3    AV peak gradient 8 mmHg    MV peak gradient 4 mmHg    TV rest pulmonary artery pressure 14 mmHg    E/E' ratio 10.67 m/s    MV Peak E Dick 0.64 m/s    TR Max Dick 1.23 m/s    MV VTI 20.7 cm    MV stenosis pressure 1/2 time 64.00 ms    MV Peak A Dick 0.89 m/s    LV Systolic Volume 52.60 mL    LV Systolic Volume Index 30.4 mL/m2    LV Diastolic Volume 104.00 mL    LV Diastolic Volume Index 60.12 mL/m2    LV Mass Index 90 g/m2    Triscuspid Valve Regurgitation Peak Gradient 6 mmHg    LA Volume Index (Mod) 19.2 mL/m2    LA volume (mod) 33.20 cm3   APTT    Collection Time: 08/31/22  6:42 PM   Result Value Ref Range    PTT 34.8 (H) 23.2 - 33.7 seconds   Protime-INR    Collection Time: 08/31/22  6:42 PM   Result Value Ref Range    PT 13.8 12.5 - 14.5 seconds    INR 1.07 0.00 - 1.30   PTT Heparin Monitoring    Collection Time: 09/01/22  2:40 AM   Result Value Ref Range    PTT Heparin Monitor 37.7 (H) 23.2 - 33.7 seconds   CBC with Differential    Collection Time: 09/01/22  2:40 AM   Result Value Ref Range    WBC 8.1 4.5 - 11.5 x10(3)/mcL    RBC 4.47 (L) 4.70 - 6.10 x10(6)/mcL    Hgb 13.4 (L) 14.0 - 18.0 gm/dL    Hct 41.4 (L) 42.0 - 52.0 %    MCV 92.6 80.0 - 94.0 fL    MCH 30.0 27.0 - 31.0 pg    MCHC 32.4 (L) 33.0 - 36.0 mg/dL    RDW 14.0 11.5 - 17.0 %    Platelet 301 130 - 400 x10(3)/mcL    MPV 11.3 (H) 7.4 - 10.4 fL    Neut % 56.7 %    Lymph % 32.0 %    Mono % 7.7 %    Eos % 2.3 %    Basophil % 0.9 %    Lymph # 2.59 0.6 - 4.6 x10(3)/mcL    Neut # 4.6 2.1 - 9.2 x10(3)/mcL    Mono # 0.62 0.1 - 1.3 x10(3)/mcL    Eos # 0.19 0 - 0.9 x10(3)/mcL    Baso # 0.07 0 - 0.2 x10(3)/mcL    IG# 0.03 0 - 0.04 x10(3)/mcL    IG% 0.4 %    NRBC% 0.0 %   Comprehensive Metabolic Panel    Collection Time: 09/01/22  3:00 AM   Result Value Ref Range    Sodium Level 138 136 - 145 mmol/L    Potassium  Level 4.4 3.5 - 5.1 mmol/L    Chloride 110 (H) 98 - 107 mmol/L    Carbon Dioxide 20 (L) 23 - 31 mmol/L    Glucose Level 85 82 - 115 mg/dL    Blood Urea Nitrogen 8.0 (L) 8.4 - 25.7 mg/dL    Creatinine 0.71 (L) 0.73 - 1.18 mg/dL    Calcium Level Total 9.3 8.8 - 10.0 mg/dL    Protein Total 6.8 5.8 - 7.6 gm/dL    Albumin Level 3.8 3.4 - 4.8 gm/dL    Globulin 3.0 2.4 - 3.5 gm/dL    Albumin/Globulin Ratio 1.3 1.1 - 2.0 ratio    Bilirubin Total 0.3 <=1.5 mg/dL    Alkaline Phosphatase 49 40 - 150 unit/L    Alanine Aminotransferase 77 (H) 0 - 55 unit/L    Aspartate Aminotransferase 47 (H) 5 - 34 unit/L    eGFR >60 mls/min/1.73/m2   Magnesium    Collection Time: 09/01/22  3:00 AM   Result Value Ref Range    Magnesium Level 2.30 1.60 - 2.60 mg/dL   Type & Screen    Collection Time: 09/01/22  5:08 AM   Result Value Ref Range    Group & Rh A POS     Indirect Selena GEL NEG    PTT Heparin Monitoring    Collection Time: 09/01/22 12:22 PM   Result Value Ref Range    PTT Heparin Monitor 36.6 (H) 23.2 - 33.7 seconds   PTT Heparin Monitoring    Collection Time: 09/01/22  8:32 PM   Result Value Ref Range    PTT Heparin Monitor 58.2 (H) 23.2 - 33.7 seconds   Magnesium    Collection Time: 09/02/22  4:01 AM   Result Value Ref Range    Magnesium Level 2.20 1.60 - 2.60 mg/dL   Comprehensive Metabolic Panel    Collection Time: 09/02/22  4:01 AM   Result Value Ref Range    Sodium Level 138 136 - 145 mmol/L    Potassium Level 4.0 3.5 - 5.1 mmol/L    Chloride 107 98 - 107 mmol/L    Carbon Dioxide 21 (L) 23 - 31 mmol/L    Glucose Level 85 82 - 115 mg/dL    Blood Urea Nitrogen 11.9 8.4 - 25.7 mg/dL    Creatinine 0.75 0.73 - 1.18 mg/dL    Calcium Level Total 9.4 8.8 - 10.0 mg/dL    Protein Total 6.0 5.8 - 7.6 gm/dL    Albumin Level 3.9 3.4 - 4.8 gm/dL    Globulin 2.1 (L) 2.4 - 3.5 gm/dL    Albumin/Globulin Ratio 1.9 1.1 - 2.0 ratio    Bilirubin Total 0.4 <=1.5 mg/dL    Alkaline Phosphatase 48 40 - 150 unit/L    Alanine Aminotransferase 53 0 - 55  unit/L    Aspartate Aminotransferase 25 5 - 34 unit/L    eGFR >60 mls/min/1.73/m2   PTT Heparin Monitoring    Collection Time: 09/02/22  4:01 AM   Result Value Ref Range    PTT Heparin Monitor 78.2 (H) 23.2 - 33.7 seconds   CBC with Differential    Collection Time: 09/02/22  4:01 AM   Result Value Ref Range    WBC 9.6 4.5 - 11.5 x10(3)/mcL    RBC 4.37 (L) 4.70 - 6.10 x10(6)/mcL    Hgb 12.9 (L) 14.0 - 18.0 gm/dL    Hct 40.5 (L) 42.0 - 52.0 %    MCV 92.7 80.0 - 94.0 fL    MCH 29.5 27.0 - 31.0 pg    MCHC 31.9 (L) 33.0 - 36.0 mg/dL    RDW 13.8 11.5 - 17.0 %    Platelet 328 130 - 400 x10(3)/mcL    MPV 10.9 (H) 7.4 - 10.4 fL    Neut % 60.4 %    Lymph % 27.8 %    Mono % 8.9 %    Eos % 2.0 %    Basophil % 0.6 %    Lymph # 2.66 0.6 - 4.6 x10(3)/mcL    Neut # 5.8 2.1 - 9.2 x10(3)/mcL    Mono # 0.85 0.1 - 1.3 x10(3)/mcL    Eos # 0.19 0 - 0.9 x10(3)/mcL    Baso # 0.06 0 - 0.2 x10(3)/mcL    IG# 0.03 0 - 0.04 x10(3)/mcL    IG% 0.3 %    NRBC% 0.0 %   PTT Heparin Monitoring    Collection Time: 09/02/22 10:39 AM   Result Value Ref Range    PTT Heparin Monitor 42.3 (H) 23.2 - 33.7 seconds       Telemetry: NSR    Physical Exam  Vitals and nursing note reviewed.   Deferred s/t active COVID 19 infection    Current Inpatient Medications:    Current Facility-Administered Medications:     acetaminophen tablet 1,000 mg, 1,000 mg, Oral, Q6H PRN, Yomi Milligan MD, 1,000 mg at 08/31/22 1934    acetaminophen tablet 650 mg, 650 mg, Oral, Q4H PRN, Yomi Milligan MD    acetaminophen tablet 650 mg, 650 mg, Oral, Q4H PRN, Luis Daniel Maza MD    aluminum-magnesium hydroxide-simethicone 200-200-20 mg/5 mL suspension 30 mL, 30 mL, Oral, QID PRN, Yomi Milligan MD    aspirin chewable tablet 81 mg, 81 mg, Oral, Daily, Yomi Milligan MD, 81 mg at 09/02/22 1023    benzonatate capsule 200 mg, 200 mg, Oral, TID PRN, Yomi Milligan MD    dextromethorphan-guaiFENesin  mg/5 ml liquid 5 mL, 5 mL, Oral, Q4H PRN, Yomi Milligan MD    famotidine tablet 20 mg, 20 mg,  Oral, BID, Yomi Milligan MD, 20 mg at 09/02/22 1023    fentaNYL 50 mcg/mL injection, , , PRN, Luis Daniel Maza MD, 50 mcg at 08/31/22 1546    heparin (porcine) injection, , , PRN, Luis Daniel Maza MD, 5,000 Units at 08/31/22 1549    heparin 25,000 units in dextrose 5% (100 units/ml) IV bolus from bag - ADDITIONAL PRN BOLUS - 30 units/kg (max bolus 4000 units), 30 Units/kg (Adjusted), Intravenous, PRN, Mouna Rm, FNP, 1,880 Units at 09/01/22 2158    heparin 25,000 units in dextrose 5% (100 units/ml) IV bolus from bag - ADDITIONAL PRN BOLUS - 60 units/kg (max bolus 4000 units), 60 Units/kg (Adjusted), Intravenous, PRN, Mouna Rm, FNP, 3,760 Units at 09/01/22 1452    heparin 25,000 units in dextrose 5% 250 mL (100 units/mL) infusion LOW INTENSITY nomogram - [facility], 0-40 Units/kg/hr (Adjusted), Intravenous, Continuous, ARLET CokerP, Last Rate: 12.5 mL/hr at 09/02/22 0632, 20 Units/kg/hr at 09/02/22 0632    iopamidoL (ISOVUE-370) injection, , , PRN, Luis Daniel Maza MD, 100 mL at 08/31/22 1635    LIDOcaine HCL 10 mg/ml (1%) injection, , , PRN, Luis Daniel Maza MD, 10 mL at 08/31/22 1546    melatonin tablet 6 mg, 6 mg, Oral, Nightly PRN, Yomi Milligan MD    metoprolol succinate (TOPROL-XL) 24 hr split tablet 12.5 mg, 12.5 mg, Oral, Daily, Yomi Milligan MD, 12.5 mg at 09/02/22 1023    midazolam (VERSED) 1 mg/mL injection, , , PRN, Luis Daniel Maza MD, 1 mg at 08/31/22 1551    nitroGLYCERIN injection, , , PRN, Luis Daniel Maza MD, 200 mcg at 08/31/22 1603    nitroGLYCERIN SL tablet 0.4 mg, 0.4 mg, Sublingual, Q5 Min PRN, Yomi Milligan MD    ondansetron disintegrating tablet 8 mg, 8 mg, Oral, Q8H PRN, Luis Daniel Maza MD    ondansetron injection 4 mg, 4 mg, Intravenous, Q4H PRN, Yomi Milligan MD    polyethylene glycol packet 17 g, 17 g, Oral, BID PRN, Yomi Milligan MD    prochlorperazine injection Soln 5 mg, 5 mg, Intravenous, Q6H PRN, Yomi Milligan MD    senna-docusate 8.6-50 mg per tablet 1  tablet, 1 tablet, Oral, BID PRN, Yomi Milligan MD    simethicone chewable tablet 80 mg, 1 tablet, Oral, QID PRN, Yomi Milligan MD    sodium chloride 0.9% flush 10 mL, 10 mL, Intravenous, PRN, Yomi Milligan MD    sodium chloride 0.9% flush 10 mL, 10 mL, Intravenous, PRN, Mouna Rm, FNP    verapamiL injection, , , PRN, Luis Daniel Maza MD, 2.5 mg at 08/31/22 1548    VTE Risk Mitigation (From admission, onward)           Ordered     heparin 25,000 units in dextrose 5% (100 units/ml) IV bolus from bag - ADDITIONAL PRN BOLUS - 60 units/kg (max bolus 4000 units)  As needed (PRN)        Question:  Heparin Infusion Adjustment (DO NOT MODIFY ANSWER)  Answer:  \\[email or web address]\Images\Pharmacy\HeparinInfusions\heparin LOW INTENSITY nomogram for [facility] [id number].pdf    08/31/22 1642     heparin 25,000 units in dextrose 5% (100 units/ml) IV bolus from bag - ADDITIONAL PRN BOLUS - 30 units/kg (max bolus 4000 units)  As needed (PRN)        Question:  Heparin Infusion Adjustment (DO NOT MODIFY ANSWER)  Answer:  \\[email or web address]\Images\Pharmacy\HeparinInfusions\heparin LOW INTENSITY nomogram for [facility] [id number].pdf    08/31/22 1642     heparin 25,000 units in dextrose 5% 250 mL (100 units/mL) infusion LOW INTENSITY nomogram - [facility]  Continuous        Question Answer Comment   Begin at (in units/kg/hr) 12    Heparin Infusion Adjustment (DO NOT MODIFY ANSWER) [email or web address]\Images\Pharmacy\HeparinInfusions\heparin LOW INTENSITY nomogram for [facility] [id number].pdf        08/31/22 1642     heparin (porcine) injection  As needed (PRN)         08/31/22 1550     IP VTE LOW RISK PATIENT  Once         08/30/22 2316     Place sequential compression device  Until discontinued         08/30/22 2316                    Assessment:   NSTEMI -Type 1    - Trop 4.00 trending down    - EKG changes     - Currently CP free  Multi Vessel CAD    - Left main-normal; LAD-99% prox, mid, large diagonal occluded with circumflex collaterals; Left CX-50% mid, IFR not  significant; RCA-dominant, 80% distal involving PDA and PLB; EDP 15 mm HG.  Acute Systolic/Diastolic Heart Failure-euvolemic on exam    - EF is 42%    - Grade I DD    - regional wall motion abnormalities of the LAD and RCA territories  Active COVID 19 infection  Transaminitis  Pre DM  No Hx of GIB    Plan:   Continue heparin gtt.  Continue Asa/BB  Transaminitis resolved. Will start atorvastatin 40 mg daily.   Appreciate recs from [facility].   Due to the severity of the patient's CAD, will obtain a second opinion.   CBC/CMP/Mag in am    SHELBY Coker  Cardiology  [facility] - [facility] Telemetry  09/02/2022

## 2022-09-02 NOTE — PROGRESS NOTES
Cardiovascular surgery-initial note   Full consult to follow     This is a 67-year-old male who was admitted to an [facility] hospital with substernal chest discomfort.  A left heart catheterization was performed that showed a high-grade proximal lesion in the left anterior descending.  There is a  of the 1st diagonal  and  the distal circumflex is small..  The right coronary has minimal luminal disease however at its bifurcation to the posterior descending and posterolateral, there is another high-grade tubular stenosis.  Left ventricular function is preserved.  The patient was tested for COVID and has come up positive.  He is presently stable and pain-free.      With the patient being COVID positive, we are disinclined to take the patient directly to surgery until he is well recovered.  I would prefer that he be medically stabilized and referred for surgery 2 weeks after he turns COVID negative.  However, if his symptoms require and if the Cardiology Service prefers that he be treated sooner, percutaneous solution should be entertained.

## 2022-09-02 NOTE — CONSULTS
[facility] - [facility]  Cardiothoracic Surgery  Consult Note    Patient Name: Gustavo Cheung  MRN: [id number]  Admission Date: 8/30/2022  Attending Physician: Bahman Riojas MD  Referring Provider: Kemar Burns MD    Patient information was obtained from patient, past medical records, ER records, and primary team.     Inpatient consult to Cardiothoracic Surgery  Consult performed by: Adolfo Coleman MD  Consult ordered by: SHELBY Coker  Reason for consult: Second opinion for surgical revacularization in COVID + patient with chest pain syndrome currently asymptomatic  Assessment/Recommendations: This is a 67-year-old male who was admitted to an [facility] hospital with substernal chest discomfort.  A left heart catheterization was performed that showed a high-grade proximal lesion in the left anterior descending with moderately depressed LVF.   The right coronary has minimal luminal disease however at its bifurcation to the posterior descending and posterolateral, there is another high-grade stenosis.   The patient was tested for COVID and has come up positive.  He is presently stable and pain-free.       Usually with the patient being COVID positive, would defer surgery until he has recovered from his COVID syndrome if at all possible, as the risk of COVID related myocardial dysfunction is quite high.  Fortunately, the patient has minimal COVID symptoms.  Considering the degree of proximal LAD stenosis,  I would recommend surgery this admission.  Also recommend IV heparin drip in the interim.  Will discuss with cardiology.  Please repeat COVID assay Sunday as this will be 5 days from his last positive test.            Subjective:     Chief Complaint/Reason for Admission: Chest pain syndrome    History of Present Illness: Mr. Cheung is a 66 year old male who presented to [facility] ED on 8.30.22 with complaints of chest pain that started one day prior. He describes the pain as  pressure-like substernal, nonradiating with an association of diaphoresis. He reports that the pain lasted for about an hour and resolved with nitro SL provided by EMS. Upon arrival to the ED, his initial troponin was 1.078 and AST//104. He was also noted to be COVID positive. His UDS was positive for cannabis. His EKG demonstrated SR w/ T-wave inversion in lateral leads. He denies current CP, SOB, or palps. [facility] has been consulted to further evaluate the patient's CP and elevated troponin. And I have been asked to render a second opinion about treatment.    No current facility-administered medications on file prior to encounter.     No current outpatient medications on file prior to encounter.       Review of patient's allergies indicates:  No Known Allergies    History reviewed. No pertinent past medical history.  Past Surgical History:   Procedure Laterality Date    HERNIA REPAIR      LEFT HEART CATHETERIZATION Left 2022    Procedure: Left heart cath;  Surgeon: Luis Daniel Maza MD;  Location: [facility] CATH LAB;  Service: Cardiology;  Laterality: Left;    PERCUTANEOUS TRANSLUMINAL BALLOON ANGIOPLASTY OF CORONARY ARTERY  2022    Procedure: Angioplasty-coronary;  Surgeon: Luis Daniel Maza MD;  Location: [facility] CATH LAB;  Service: Cardiology;;     Family History    None       Tobacco Use    Smoking status: Former     Types: Cigarettes     Quit date: 2016     Years since quittin.0    Smokeless tobacco: Never   Substance and Sexual Activity    Alcohol use: Not Currently    Drug use: Never    Sexual activity: Not Currently     Birth control/protection: Abstinence     Review of Systems   Constitutional:  Negative for activity change, chills, diaphoresis and fever.   HENT:  Negative for congestion, dental problem, drooling, mouth sores, sore throat, trouble swallowing and voice change.    Eyes: Negative.    Respiratory:  Negative for apnea, cough, choking, chest tightness, shortness of breath,  wheezing and stridor.    Cardiovascular:  Negative for chest pain, palpitations and leg swelling.   Gastrointestinal:  Negative for abdominal distention, abdominal pain, blood in stool, nausea and vomiting.   Endocrine: Negative.    Musculoskeletal:  Negative for arthralgias, back pain, gait problem, neck pain and neck stiffness.   Skin:  Negative for color change, pallor, rash and wound.   Allergic/Immunologic: Negative.    Neurological:  Negative for dizziness, seizures, syncope, facial asymmetry, weakness, light-headedness and headaches.   Hematological:  Negative for adenopathy.   Psychiatric/Behavioral:  Negative for agitation and hallucinations.    Objective:     Vital Signs (Most Recent):  Temp: 98.7 °F (37.1 °C) (09/02/22 1207)  Pulse: 74 (09/02/22 1207)  Resp: 18 (09/02/22 0849)  BP: 120/77 (09/02/22 1207)  SpO2: 97 % (09/02/22 1207) Vital Signs (24h Range):  Temp:  [97.3 °F (36.3 °C)-98.8 °F (37.1 °C)] 98.7 °F (37.1 °C)  Pulse:  [71-78] 74  Resp:  [12-20] 18  SpO2:  [94 %-97 %] 97 %  BP: (106-128)/(68-80) 120/77     Weight: 73.4 kg (161 lb 13.1 oz)  Body mass index is 27.78 kg/m².    SpO2: 97 %  O2 Device (Oxygen Therapy): nasal cannula     Intake/Output - Last 3 Shifts         08/31 0700 09/01 0659 09/01 0700 09/02 0659 09/02 0700  09/03 0659    P.O.  1200     Total Intake(mL/kg)  1200 (16.3)     Urine (mL/kg/hr) 400 (0.2) 575 (0.3) 850 (2)    Total Output 400 575 850    Net -400 +625 -850           Urine Occurrence 2 x 6 x     Stool Occurrence 0 x               Lines/Drains/Airways       Peripheral Intravenous Line  Duration                  Peripheral IV - Single Lumen 08/30/22 1214 20 G Right Antecubital 3 days                     STS Risk Score: 0.6/5%    Physical Exam  Vitals and nursing note reviewed.   Constitutional:       General: He is not in acute distress.     Appearance: Normal appearance.   HENT:      Head: Normocephalic and atraumatic.      Nose: Nose normal. No congestion.       Mouth/Throat:      Mouth: Mucous membranes are moist.   Eyes:      General: No scleral icterus.     Extraocular Movements: Extraocular movements intact.      Conjunctiva/sclera: Conjunctivae normal.      Pupils: Pupils are equal, round, and reactive to light.   Neck:      Thyroid: No thyroid mass or thyromegaly.      Vascular: No carotid bruit or JVD.   Cardiovascular:      Rate and Rhythm: Normal rate and regular rhythm.      Pulses: Normal pulses.           Carotid pulses are 2+ on the right side and 2+ on the left side.       Radial pulses are 2+ on the right side and 2+ on the left side.        Femoral pulses are 2+ on the right side and 2+ on the left side.       Dorsalis pedis pulses are 2+ on the right side and 2+ on the left side.        Posterior tibial pulses are 2+ on the right side and 2+ on the left side.      Heart sounds: No murmur heard.    No friction rub. No gallop.   Pulmonary:      Effort: Pulmonary effort is normal. No respiratory distress.      Breath sounds: Normal breath sounds. No stridor. No wheezing, rhonchi or rales.   Chest:      Chest wall: No tenderness.   Abdominal:      General: Abdomen is flat. Bowel sounds are normal. There is no distension.      Palpations: Abdomen is soft. There is no hepatomegaly, splenomegaly, mass or pulsatile mass.      Tenderness: There is no abdominal tenderness. There is no rebound.   Musculoskeletal:         General: No swelling. Normal range of motion.      Cervical back: Normal range of motion. No rigidity or tenderness.      Right lower leg: No edema.      Left lower leg: No edema.   Lymphadenopathy:      Cervical: No cervical adenopathy.      Upper Body:      Right upper body: No supraclavicular or axillary adenopathy.      Left upper body: No supraclavicular or axillary adenopathy.   Skin:     General: Skin is warm and dry.   Neurological:      General: No focal deficit present.      Mental Status: He is alert and oriented to person, place, and time.  Mental status is at baseline.      Cranial Nerves: No cranial nerve deficit.      Sensory: No sensory deficit.      Motor: No weakness.      Gait: Gait normal.   Psychiatric:         Mood and Affect: Mood normal.       Significant Labs:  All pertinent labs from the last 24 hours have been reviewed.    Significant Diagnostics:  I have reviewed and interpreted all pertinent imaging results/findings within the past 24 hours.    Assessment/Plan:     NYHA Score: NYHA I: cardiac disease, but without resulting limitations of physical activity    Active Diagnoses:    Diagnosis Date Noted POA    PRINCIPAL PROBLEM:  Type 1 non-ST elevation myocardial infarction (NSTEMI) [I21.4] 09/02/2022 Yes      Problems Resolved During this Admission:       Thank you for your consult. I will follow-up with patient. Please contact us if you have any additional questions.    Adolfo Coleman MD  Cardiothoracic Surgery  [facility] - [facility]

## 2022-09-03 ENCOUNTER — ANESTHESIA EVENT (OUTPATIENT)
Dept: SURGERY | Facility: HOSPITAL | Age: 67
DRG: 231 | End: 2022-09-03
Payer: MEDICARE

## 2022-09-03 LAB
ALBUMIN SERPL-MCNC: 3.8 GM/DL (ref 3.4–4.8)
ALBUMIN/GLOB SERPL: 1.5 RATIO (ref 1.1–2)
ALP SERPL-CCNC: 50 UNIT/L (ref 40–150)
ALT SERPL-CCNC: 47 UNIT/L (ref 0–55)
AST SERPL-CCNC: 21 UNIT/L (ref 5–34)
BILIRUBIN DIRECT+TOT PNL SERPL-MCNC: 0.4 MG/DL
BUN SERPL-MCNC: 13.4 MG/DL (ref 8.4–25.7)
CALCIUM SERPL-MCNC: 9.5 MG/DL (ref 8.8–10)
CHLORIDE SERPL-SCNC: 108 MMOL/L (ref 98–107)
CO2 SERPL-SCNC: 24 MMOL/L (ref 23–31)
CREAT SERPL-MCNC: 0.82 MG/DL (ref 0.73–1.18)
ERYTHROCYTE [DISTWIDTH] IN BLOOD BY AUTOMATED COUNT: 13.8 % (ref 11.5–17)
GFR SERPLBLD CREATININE-BSD FMLA CKD-EPI: >60 MLS/MIN/1.73/M2
GLOBULIN SER-MCNC: 2.5 GM/DL (ref 2.4–3.5)
GLUCOSE SERPL-MCNC: 93 MG/DL (ref 82–115)
HCT VFR BLD AUTO: 41.4 % (ref 42–52)
HGB BLD-MCNC: 13.2 GM/DL (ref 14–18)
MCH RBC QN AUTO: 29.8 PG (ref 27–31)
MCHC RBC AUTO-ENTMCNC: 31.9 MG/DL (ref 33–36)
MCV RBC AUTO: 93.5 FL (ref 80–94)
NRBC BLD AUTO-RTO: 0 %
PLATELET # BLD AUTO: 317 X10(3)/MCL (ref 130–400)
PMV BLD AUTO: 10.5 FL (ref 7.4–10.4)
POTASSIUM SERPL-SCNC: 4.6 MMOL/L (ref 3.5–5.1)
PROT SERPL-MCNC: 6.3 GM/DL (ref 5.8–7.6)
RBC # BLD AUTO: 4.43 X10(6)/MCL (ref 4.7–6.1)
SODIUM SERPL-SCNC: 141 MMOL/L (ref 136–145)
WBC # SPEC AUTO: 7.8 X10(3)/MCL (ref 4.5–11.5)

## 2022-09-03 PROCEDURE — 21400001 HC TELEMETRY ROOM

## 2022-09-03 PROCEDURE — 80053 COMPREHEN METABOLIC PANEL: CPT | Performed by: INTERNAL MEDICINE

## 2022-09-03 PROCEDURE — 85027 COMPLETE CBC AUTOMATED: CPT | Performed by: INTERNAL MEDICINE

## 2022-09-03 PROCEDURE — 25000003 PHARM REV CODE 250: Performed by: INTERNAL MEDICINE

## 2022-09-03 PROCEDURE — 63600175 PHARM REV CODE 636 W HCPCS

## 2022-09-03 PROCEDURE — 27000207 HC ISOLATION

## 2022-09-03 PROCEDURE — 36415 COLL VENOUS BLD VENIPUNCTURE: CPT | Performed by: INTERNAL MEDICINE

## 2022-09-03 RX ADMIN — Medication 81 MG: at 11:09

## 2022-09-03 RX ADMIN — METOPROLOL SUCCINATE 12.5 MG: 25 TABLET, EXTENDED RELEASE ORAL at 11:09

## 2022-09-03 RX ADMIN — HEPARIN SODIUM 23 UNITS/KG/HR: 10000 INJECTION, SOLUTION INTRAVENOUS at 08:09

## 2022-09-03 RX ADMIN — FAMOTIDINE 20 MG: 20 TABLET, FILM COATED ORAL at 11:09

## 2022-09-03 RX ADMIN — FAMOTIDINE 20 MG: 20 TABLET, FILM COATED ORAL at 08:09

## 2022-09-03 RX ADMIN — ACETAMINOPHEN 650 MG: 325 TABLET ORAL at 08:09

## 2022-09-03 RX ADMIN — ATORVASTATIN CALCIUM 40 MG: 40 TABLET, FILM COATED ORAL at 08:09

## 2022-09-03 NOTE — PROGRESS NOTES
[facility] - [facility] Medical Telemetry  Cardiology  Progress Note    Patient Name: Gustavo Cheung  MRN: [id number]  Admission Date: 8/30/2022  Hospital Length of Stay: 3 days  Code Status: Full Code   Attending Physician: Bahman Riojas MD   Primary Care Physician: No primary care provider on file.  Expected Discharge Date:   Principal Problem:Type 1 non-ST elevation myocardial infarction (NSTEMI)    Subjective:     Brief HPI:  Mr. Cheung is a 66 year old male who is unknown to [facility]. He presented to [facility] ED on 8.30.22 with complaints of chest pain that started yesterday. He describes the pain as pressure-like substernal, nonradiating with an association of diaphoresis. He reports that the pain lasted for about an hour and resolved with nitro SL provided by EMS. Upon arrival to the ED, his initial troponin was 1.078 and AST//104. He was also noted to be COVID positive. His UDS was positive for cannabis. His EKG demonstrated SR w/ T-wave inversion in lateral leads. He denies current CP, SOB, or palps. [facility] has been consulted to further evaluate the patient's CP and elevated troponin.     9.2.22: NAD. Denies CP, SOB, or palps. Heparin drip per protocol.   9.3.22: NAD. Denies CP, SOB, or palps. Continues on heparin drip per protocol.     Hospital Course:  PMH: pre DM  PSH: hernia repair, right thigh & tendon repair surgery  Family History: Noncontributory  Social History: Ex smoker (quit in 2016). Reports occasional alcohol use (less than once/month). Denies drug use.      Previous Diagnostics:  [facility] 8.31.22  Left main-short, normal  Lad-99% prox, 80% Mid, Large diagonal occluded with circumflex collaterals  LCX-50% mid, IFR not significant  RCA-dominant, 80% distal involving PDA and PLB  EDP 15 mmHg     Maximize medical management  Consult CT surgery for bypass to the LAD, diagonal, PLB, PDA   If patient is turned down for surgery consider staged intervention to LAD across the diagonal  Check  out has been given the cardiology service and on-call Cardiology  CT surgery consult has been placed      Echo 8.31.22  The left ventricle is normal in size with concentric remodeling and mildly decreased systolic function.  The estimated ejection fraction is 42%.  Regional wall motion abnormalities of the LAD and RCA territories.  Grade I left ventricular diastolic dysfunction.  Normal right ventricular size with normal right ventricular systolic function.  The estimated PA systolic pressure is 14 mmHg    Review of Systems   Cardiovascular: Negative.  Negative for chest pain and palpitations.   Respiratory: Negative.  Negative for shortness of breath.    All other systems reviewed and are negative.    Objective:     Vital Signs (Most Recent):  Temp: 97.7 °F (36.5 °C) (09/03/22 0913)  Pulse: 69 (09/03/22 0913)  Resp: 16 (09/03/22 0456)  BP: 108/73 (09/03/22 1107)  SpO2: 96 % (09/03/22 0913)   Vital Signs (24h Range):  Temp:  [97.7 °F (36.5 °C)-98.4 °F (36.9 °C)] 97.7 °F (36.5 °C)  Pulse:  [58-69] 69  Resp:  [16-20] 16  SpO2:  [95 %-96 %] 96 %  BP: (108-115)/(71-73) 108/73     Weight: 73.4 kg (161 lb 13.1 oz)  Body mass index is 27.78 kg/m².    SpO2: 96 %  O2 Device (Oxygen Therapy): room air      Intake/Output Summary (Last 24 hours) at 9/3/2022 1302  Last data filed at 9/2/2022 1500  Gross per 24 hour   Intake 720 ml   Output 300 ml   Net 420 ml         Lines/Drains/Airways       Peripheral Intravenous Line  Duration                  Peripheral IV - Single Lumen 08/30/22 1214 20 G Right Antecubital 4 days                    Significant Labs:   Recent Results (from the past 72 hour(s))   Echo    Collection Time: 08/31/22  3:32 PM   Result Value Ref Range    BSA 1.75 m2    TDI SEPTAL 0.06 m/s    LV LATERAL E/E' RATIO 10.67 m/s    LV SEPTAL E/E' RATIO 10.67 m/s    Right Atrial Pressure (from IVC) 8 mmHg    EF 42 %    Left Ventricular Outflow Tract Mean Velocity 0.47 cm/s    Left Ventricular Outflow Tract Mean Gradient  1.00 mmHg    TDI LATERAL 0.06 m/s    LVIDd 4.74 3.5 - 6.0 cm    IVS 0.86 0.6 - 1.1 cm    Posterior Wall 1.04 0.6 - 1.1 cm    LVIDs 3.55 2.1 - 4.0 cm    FS 25 28 - 44 %    LV mass 155.57 g    LA size 3.50 cm    RVDD 3.25 cm    TAPSE 1.61 cm    Left Ventricle Relative Wall Thickness 0.44 cm    AV mean gradient 4 mmHg    AV valve area 1.70 cm2    AV Velocity Ratio 0.53     AV index (prosthetic) 0.54     MV mean gradient 1 mmHg    MV valve area p 1/2 method 3.44 cm2    MV valve area by continuity eq 2.05 cm2    E/A ratio 0.72     Mean e' 0.06 m/s    E wave deceleration time 201.00 msec    LVOT diameter 2.00 cm    LVOT area 3.1 cm2    LVOT peak dick 0.73 m/s    LVOT peak VTI 13.50 cm    Ao peak dick 1.37 m/s    Ao VTI 25.0 cm    LVOT stroke volume 42.39 cm3    AV peak gradient 8 mmHg    MV peak gradient 4 mmHg    TV rest pulmonary artery pressure 14 mmHg    E/E' ratio 10.67 m/s    MV Peak E Dick 0.64 m/s    TR Max Dick 1.23 m/s    MV VTI 20.7 cm    MV stenosis pressure 1/2 time 64.00 ms    MV Peak A Dick 0.89 m/s    LV Systolic Volume 52.60 mL    LV Systolic Volume Index 30.4 mL/m2    LV Diastolic Volume 104.00 mL    LV Diastolic Volume Index 60.12 mL/m2    LV Mass Index 90 g/m2    Triscuspid Valve Regurgitation Peak Gradient 6 mmHg    LA Volume Index (Mod) 19.2 mL/m2    LA volume (mod) 33.20 cm3   APTT    Collection Time: 08/31/22  6:42 PM   Result Value Ref Range    PTT 34.8 (H) 23.2 - 33.7 seconds   Protime-INR    Collection Time: 08/31/22  6:42 PM   Result Value Ref Range    PT 13.8 12.5 - 14.5 seconds    INR 1.07 0.00 - 1.30   PTT Heparin Monitoring    Collection Time: 09/01/22  2:40 AM   Result Value Ref Range    PTT Heparin Monitor 37.7 (H) 23.2 - 33.7 seconds   CBC with Differential    Collection Time: 09/01/22  2:40 AM   Result Value Ref Range    WBC 8.1 4.5 - 11.5 x10(3)/mcL    RBC 4.47 (L) 4.70 - 6.10 x10(6)/mcL    Hgb 13.4 (L) 14.0 - 18.0 gm/dL    Hct 41.4 (L) 42.0 - 52.0 %    MCV 92.6 80.0 - 94.0 fL    MCH 30.0  27.0 - 31.0 pg    MCHC 32.4 (L) 33.0 - 36.0 mg/dL    RDW 14.0 11.5 - 17.0 %    Platelet 301 130 - 400 x10(3)/mcL    MPV 11.3 (H) 7.4 - 10.4 fL    Neut % 56.7 %    Lymph % 32.0 %    Mono % 7.7 %    Eos % 2.3 %    Basophil % 0.9 %    Lymph # 2.59 0.6 - 4.6 x10(3)/mcL    Neut # 4.6 2.1 - 9.2 x10(3)/mcL    Mono # 0.62 0.1 - 1.3 x10(3)/mcL    Eos # 0.19 0 - 0.9 x10(3)/mcL    Baso # 0.07 0 - 0.2 x10(3)/mcL    IG# 0.03 0 - 0.04 x10(3)/mcL    IG% 0.4 %    NRBC% 0.0 %   Comprehensive Metabolic Panel    Collection Time: 09/01/22  3:00 AM   Result Value Ref Range    Sodium Level 138 136 - 145 mmol/L    Potassium Level 4.4 3.5 - 5.1 mmol/L    Chloride 110 (H) 98 - 107 mmol/L    Carbon Dioxide 20 (L) 23 - 31 mmol/L    Glucose Level 85 82 - 115 mg/dL    Blood Urea Nitrogen 8.0 (L) 8.4 - 25.7 mg/dL    Creatinine 0.71 (L) 0.73 - 1.18 mg/dL    Calcium Level Total 9.3 8.8 - 10.0 mg/dL    Protein Total 6.8 5.8 - 7.6 gm/dL    Albumin Level 3.8 3.4 - 4.8 gm/dL    Globulin 3.0 2.4 - 3.5 gm/dL    Albumin/Globulin Ratio 1.3 1.1 - 2.0 ratio    Bilirubin Total 0.3 <=1.5 mg/dL    Alkaline Phosphatase 49 40 - 150 unit/L    Alanine Aminotransferase 77 (H) 0 - 55 unit/L    Aspartate Aminotransferase 47 (H) 5 - 34 unit/L    eGFR >60 mls/min/1.73/m2   Magnesium    Collection Time: 09/01/22  3:00 AM   Result Value Ref Range    Magnesium Level 2.30 1.60 - 2.60 mg/dL   Type & Screen    Collection Time: 09/01/22  5:08 AM   Result Value Ref Range    Group & Rh A POS     Indirect Selena GEL NEG    Prepare RBC 6 Units; cabg    Collection Time: 09/01/22  5:08 AM   Result Value Ref Range    UNIT NUMBER [id number]     UNIT ABO/RH A POS     DISPENSE STATUS Selected     Unit Expiration [id number]     Product Code T7811O91     Unit Blood Type Code 6200     CROSSMATCH INTERPRETATION Compatible     UNIT NUMBER [id number]     UNIT ABO/RH A POS     DISPENSE STATUS Selected     Unit Expiration [id number]     Product Code U7705R25     Unit Blood Type Code  6200     CROSSMATCH INTERPRETATION Compatible     UNIT NUMBER [id number]     UNIT ABO/RH A POS     DISPENSE STATUS Selected     Unit Expiration [id number]     Product Code [id number]     Unit Blood Type Code 6200     CROSSMATCH INTERPRETATION Compatible     UNIT NUMBER [id number]     UNIT ABO/RH A POS     DISPENSE STATUS Selected     Unit Expiration [id number]     Product Code I4362F98     Unit Blood Type Code 6200     CROSSMATCH INTERPRETATION Compatible     UNIT NUMBER [id number]     UNIT ABO/RH A POS     DISPENSE STATUS Selected     Unit Expiration [id number]     Product Code E9166K43     Unit Blood Type Code 6200     CROSSMATCH INTERPRETATION Compatible     UNIT NUMBER [id number]     UNIT ABO/RH A POS     DISPENSE STATUS Selected     Unit Expiration [id number]     Product Code U7641V72     Unit Blood Type Code 6200     CROSSMATCH INTERPRETATION Compatible    Prepare Fresh Frozen Plasma 2 Units    Collection Time: 09/01/22  5:08 AM   Result Value Ref Range    UNIT NUMBER [id number]     UNIT ABO/RH A POS     DISPENSE STATUS Selected     Unit Expiration [id number]     Product Code E4760C87     Unit Blood Type Code 6200     CROSSMATCH INTERPRETATION Not required     UNIT NUMBER [id number]     UNIT ABO/RH A POS     DISPENSE STATUS Selected     Unit Expiration [id number]     Product Code X3465Z30     Unit Blood Type Code 6200     CROSSMATCH INTERPRETATION Not required    PTT Heparin Monitoring    Collection Time: 09/01/22 12:22 PM   Result Value Ref Range    PTT Heparin Monitor 36.6 (H) 23.2 - 33.7 seconds   PTT Heparin Monitoring    Collection Time: 09/01/22  8:32 PM   Result Value Ref Range    PTT Heparin Monitor 58.2 (H) 23.2 - 33.7 seconds   Magnesium    Collection Time: 09/02/22  4:01 AM   Result Value Ref Range    Magnesium Level 2.20 1.60 - 2.60 mg/dL   Comprehensive Metabolic Panel    Collection Time: 09/02/22  4:01 AM   Result Value Ref Range    Sodium Level 138 136 - 145 mmol/L     Potassium Level 4.0 3.5 - 5.1 mmol/L    Chloride 107 98 - 107 mmol/L    Carbon Dioxide 21 (L) 23 - 31 mmol/L    Glucose Level 85 82 - 115 mg/dL    Blood Urea Nitrogen 11.9 8.4 - 25.7 mg/dL    Creatinine 0.75 0.73 - 1.18 mg/dL    Calcium Level Total 9.4 8.8 - 10.0 mg/dL    Protein Total 6.0 5.8 - 7.6 gm/dL    Albumin Level 3.9 3.4 - 4.8 gm/dL    Globulin 2.1 (L) 2.4 - 3.5 gm/dL    Albumin/Globulin Ratio 1.9 1.1 - 2.0 ratio    Bilirubin Total 0.4 <=1.5 mg/dL    Alkaline Phosphatase 48 40 - 150 unit/L    Alanine Aminotransferase 53 0 - 55 unit/L    Aspartate Aminotransferase 25 5 - 34 unit/L    eGFR >60 mls/min/1.73/m2   PTT Heparin Monitoring    Collection Time: 09/02/22  4:01 AM   Result Value Ref Range    PTT Heparin Monitor 78.2 (H) 23.2 - 33.7 seconds   CBC with Differential    Collection Time: 09/02/22  4:01 AM   Result Value Ref Range    WBC 9.6 4.5 - 11.5 x10(3)/mcL    RBC 4.37 (L) 4.70 - 6.10 x10(6)/mcL    Hgb 12.9 (L) 14.0 - 18.0 gm/dL    Hct 40.5 (L) 42.0 - 52.0 %    MCV 92.7 80.0 - 94.0 fL    MCH 29.5 27.0 - 31.0 pg    MCHC 31.9 (L) 33.0 - 36.0 mg/dL    RDW 13.8 11.5 - 17.0 %    Platelet 328 130 - 400 x10(3)/mcL    MPV 10.9 (H) 7.4 - 10.4 fL    Neut % 60.4 %    Lymph % 27.8 %    Mono % 8.9 %    Eos % 2.0 %    Basophil % 0.6 %    Lymph # 2.66 0.6 - 4.6 x10(3)/mcL    Neut # 5.8 2.1 - 9.2 x10(3)/mcL    Mono # 0.85 0.1 - 1.3 x10(3)/mcL    Eos # 0.19 0 - 0.9 x10(3)/mcL    Baso # 0.06 0 - 0.2 x10(3)/mcL    IG# 0.03 0 - 0.04 x10(3)/mcL    IG% 0.3 %    NRBC% 0.0 %   PTT Heparin Monitoring    Collection Time: 09/02/22 10:39 AM   Result Value Ref Range    PTT Heparin Monitor 42.3 (H) 23.2 - 33.7 seconds   PTT Heparin Monitoring    Collection Time: 09/02/22  7:47 PM   Result Value Ref Range    PTT Heparin Monitor 117.6 (H) 23.2 - 33.7 seconds   ABORH RETYPE    Collection Time: 09/02/22  8:26 PM   Result Value Ref Range    ABORH Retype A POS    CBC Without Differential    Collection Time: 09/03/22  4:22 AM   Result  Value Ref Range    WBC 7.8 4.5 - 11.5 x10(3)/mcL    RBC 4.43 (L) 4.70 - 6.10 x10(6)/mcL    Hgb 13.2 (L) 14.0 - 18.0 gm/dL    Hct 41.4 (L) 42.0 - 52.0 %    Platelet 317 130 - 400 x10(3)/mcL    MCV 93.5 80.0 - 94.0 fL    MCH 29.8 27.0 - 31.0 pg    MCHC 31.9 (L) 33.0 - 36.0 mg/dL    RDW 13.8 11.5 - 17.0 %    MPV 10.5 (H) 7.4 - 10.4 fL    NRBC% 0.0 %   Comprehensive Metabolic Panel    Collection Time: 09/03/22  4:22 AM   Result Value Ref Range    Sodium Level 141 136 - 145 mmol/L    Potassium Level 4.6 3.5 - 5.1 mmol/L    Chloride 108 (H) 98 - 107 mmol/L    Carbon Dioxide 24 23 - 31 mmol/L    Glucose Level 93 82 - 115 mg/dL    Blood Urea Nitrogen 13.4 8.4 - 25.7 mg/dL    Creatinine 0.82 0.73 - 1.18 mg/dL    Calcium Level Total 9.5 8.8 - 10.0 mg/dL    Protein Total 6.3 5.8 - 7.6 gm/dL    Albumin Level 3.8 3.4 - 4.8 gm/dL    Globulin 2.5 2.4 - 3.5 gm/dL    Albumin/Globulin Ratio 1.5 1.1 - 2.0 ratio    Bilirubin Total 0.4 <=1.5 mg/dL    Alkaline Phosphatase 50 40 - 150 unit/L    Alanine Aminotransferase 47 0 - 55 unit/L    Aspartate Aminotransferase 21 5 - 34 unit/L    eGFR >60 mls/min/1.73/m2       Telemetry: NSR    Physical Exam  Vitals and nursing note reviewed.   Deferred s/t active COVID 19 infection    Current Inpatient Medications:    Current Facility-Administered Medications:     0.9%  NaCl infusion (for blood administration), , Intravenous, Q24H PRN, ITA Jerez    0.9%  NaCl infusion (for blood administration), , Intravenous, Q24H PRN, ITA Jerez    acetaminophen tablet 1,000 mg, 1,000 mg, Oral, Q6H PRN, Yomi Milligan MD, 1,000 mg at 08/31/22 1934    acetaminophen tablet 650 mg, 650 mg, Oral, Q4H PRN, Yomi Milligan MD    acetaminophen tablet 650 mg, 650 mg, Oral, Q4H PRN, Luis Daniel Maza MD    aluminum-magnesium hydroxide-simethicone 200-200-20 mg/5 mL suspension 30 mL, 30 mL, Oral, QID PRN, Yomi Milligan MD    aspirin chewable tablet 81 mg, 81 mg, Oral, Daily, Yomi Milligan MD, 81 mg at 09/03/22 1107     atorvastatin tablet 40 mg, 40 mg, Oral, QHS, Bahman Riojas MD    benzonatate capsule 200 mg, 200 mg, Oral, TID PRN, Yomi Milligan MD    dextromethorphan-guaiFENesin  mg/5 ml liquid 5 mL, 5 mL, Oral, Q4H PRN, Yomi Milligan MD    famotidine tablet 20 mg, 20 mg, Oral, BID, Yomi Milligan MD, 20 mg at 09/03/22 1107    fentaNYL 50 mcg/mL injection, , , PRN, Luis Daniel Maza MD, 50 mcg at 08/31/22 1546    heparin (porcine) injection, , , PRN, Luis Daniel Maza MD, 5,000 Units at 08/31/22 1549    heparin 25,000 units in dextrose 5% (100 units/ml) IV bolus from bag - ADDITIONAL PRN BOLUS - 30 units/kg (max bolus 4000 units), 30 Units/kg (Adjusted), Intravenous, PRN, SHELBY Coker, 1,880 Units at 09/01/22 2158    heparin 25,000 units in dextrose 5% (100 units/ml) IV bolus from bag - ADDITIONAL PRN BOLUS - 60 units/kg (max bolus 4000 units), 60 Units/kg (Adjusted), Intravenous, PRN, SHELBY Coker, 3,760 Units at 09/02/22 1338    heparin 25,000 units in dextrose 5% 250 mL (100 units/mL) infusion LOW INTENSITY nomogram - [facility], 0-40 Units/kg/hr (Adjusted), Intravenous, Continuous, SHELBY Coker, Last Rate: 14.4 mL/hr at 09/02/22 2300, 23 Units/kg/hr at 09/02/22 2300    iopamidoL (ISOVUE-370) injection, , , PRN, Luis Daniel Maza MD, 100 mL at 08/31/22 1635    LIDOcaine HCL 10 mg/ml (1%) injection, , , PRN, Luis Daniel Maza MD, 10 mL at 08/31/22 1546    melatonin tablet 6 mg, 6 mg, Oral, Nightly PRN, Yomi Milligan MD    metoprolol succinate (TOPROL-XL) 24 hr split tablet 12.5 mg, 12.5 mg, Oral, Daily, Yomi Milligan MD, 12.5 mg at 09/03/22 1107    midazolam (VERSED) 1 mg/mL injection, , , PRN, Luis Daniel Maza MD, 1 mg at 08/31/22 1551    nitroGLYCERIN injection, , , PRN, Luis Daniel Maza MD, 200 mcg at 08/31/22 1603    nitroGLYCERIN SL tablet 0.4 mg, 0.4 mg, Sublingual, Q5 Min PRN, Yomi Milligan MD    ondansetron disintegrating tablet 8 mg, 8 mg, Oral, Q8H PRN, Luis Daniel Maza MD    ondansetron  injection 4 mg, 4 mg, Intravenous, Q4H PRN, Yomi Milligan MD    polyethylene glycol packet 17 g, 17 g, Oral, BID PRN, Yomi Milligan MD    prochlorperazine injection Soln 5 mg, 5 mg, Intravenous, Q6H PRN, Yomi Milligan MD    senna-docusate 8.6-50 mg per tablet 1 tablet, 1 tablet, Oral, BID PRN, Yomi Milligan MD    simethicone chewable tablet 80 mg, 1 tablet, Oral, QID PRN, Yomi Milligan MD    sodium chloride 0.9% flush 10 mL, 10 mL, Intravenous, PRN, Yomi Milligan MD    sodium chloride 0.9% flush 10 mL, 10 mL, Intravenous, PRN, SHELBY Coker    verapamiL injection, , , PRN, Luis Daniel Maza MD, 2.5 mg at 08/31/22 1548    VTE Risk Mitigation (From admission, onward)           Ordered     heparin 25,000 units in dextrose 5% (100 units/ml) IV bolus from bag - ADDITIONAL PRN BOLUS - 60 units/kg (max bolus 4000 units)  As needed (PRN)        Question:  Heparin Infusion Adjustment (DO NOT MODIFY ANSWER)  Answer:  \\[email or web address]\epic\Images\Pharmacy\HeparinInfusions\heparin LOW INTENSITY nomogram for [facility] [id number].pdf    08/31/22 1642     heparin 25,000 units in dextrose 5% (100 units/ml) IV bolus from bag - ADDITIONAL PRN BOLUS - 30 units/kg (max bolus 4000 units)  As needed (PRN)        Question:  Heparin Infusion Adjustment (DO NOT MODIFY ANSWER)  Answer:  \\[email or web address]\epic\Images\Pharmacy\HeparinInfusions\heparin LOW INTENSITY nomogram for [facility] [id number].pdf    08/31/22 1642     heparin 25,000 units in dextrose 5% 250 mL (100 units/mL) infusion LOW INTENSITY nomogram - [facility]  Continuous        Question Answer Comment   Begin at (in units/kg/hr) 12    Heparin Infusion Adjustment (DO NOT MODIFY ANSWER) \\[email or web address]\epic\Images\Pharmacy\HeparinInfusions\heparin LOW INTENSITY nomogram for [facility] [id number].pdf        08/31/22 1642     heparin (porcine) injection  As needed (PRN)         08/31/22 1550     IP VTE LOW RISK PATIENT  Once         08/30/22 2316     Place sequential compression device  Until discontinued         08/30/22  2128                    Assessment:   NSTEMI -Type 1    - Trop 4.00 trending down    - EKG changes     - Currently CP free  Multi Vessel CAD    - Left main-normal; LAD-99% prox, mid, large diagonal occluded with circumflex collaterals; Left CX-50% mid, IFR not significant; RCA-dominant, 80% distal involving PDA and PLB; EDP 15 mm HG.  Acute Systolic/Diastolic Heart Failure-euvolemic on exam    - EF is 42%    - Grade I DD    - regional wall motion abnormalities of the LAD and RCA territories  Active COVID 19 infection  Transaminitis  Pre DM  No Hx of GIB    Plan:   Continue heparin gtt.  Continue Asa/BB  Transaminitis resolved. Will start atorvastatin 40 mg daily.   Consider adding ARB s/t decrease in LVEF  Appreciate recs from [facility]. Plans for CABG Monday if COVID negative.      SHELBY Coker  Cardiology  [facility] - [address] [place] [address]  09/03/2022

## 2022-09-04 LAB
APPEARANCE UR: CLEAR
APTT PPP: 77.9 SECONDS (ref 23.2–33.7)
BACTERIA #/AREA URNS AUTO: NORMAL /HPF
BILIRUB UR QL STRIP.AUTO: NEGATIVE MG/DL
COLOR UR AUTO: YELLOW
GLUCOSE UR QL STRIP.AUTO: NEGATIVE MG/DL
GROUP & RH: NORMAL
INDIRECT COOMBS GEL: NORMAL
KETONES UR QL STRIP.AUTO: NEGATIVE MG/DL
LEUKOCYTE ESTERASE UR QL STRIP.AUTO: NEGATIVE UNIT/L
MRSA PCR SCRN (OHS): NOT DETECTED
NITRITE UR QL STRIP.AUTO: NEGATIVE
PH UR STRIP.AUTO: 8 [PH]
PROT UR QL STRIP.AUTO: NEGATIVE MG/DL
RBC #/AREA URNS AUTO: <5 /HPF
RBC UR QL AUTO: NEGATIVE UNIT/L
SARS-COV-2 RDRP RESP QL NAA+PROBE: NEGATIVE
SP GR UR STRIP.AUTO: 1.01 (ref 1–1.03)
SQUAMOUS #/AREA URNS AUTO: <5 /HPF
UROBILINOGEN UR STRIP-ACNC: 0.2 MG/DL
WBC #/AREA URNS AUTO: <5 /HPF

## 2022-09-04 PROCEDURE — 25000003 PHARM REV CODE 250: Performed by: INTERNAL MEDICINE

## 2022-09-04 PROCEDURE — 27000207 HC ISOLATION

## 2022-09-04 PROCEDURE — 86920 COMPATIBILITY TEST SPIN: CPT | Performed by: INTERNAL MEDICINE

## 2022-09-04 PROCEDURE — 87070 CULTURE OTHR SPECIMN AEROBIC: CPT

## 2022-09-04 PROCEDURE — 87635 SARS-COV-2 COVID-19 AMP PRB: CPT | Performed by: INTERNAL MEDICINE

## 2022-09-04 PROCEDURE — 63600175 PHARM REV CODE 636 W HCPCS

## 2022-09-04 PROCEDURE — 36415 COLL VENOUS BLD VENIPUNCTURE: CPT | Performed by: INTERNAL MEDICINE

## 2022-09-04 PROCEDURE — 21400001 HC TELEMETRY ROOM

## 2022-09-04 PROCEDURE — 81001 URINALYSIS AUTO W/SCOPE: CPT

## 2022-09-04 PROCEDURE — 87641 MR-STAPH DNA AMP PROBE: CPT | Performed by: INTERNAL MEDICINE

## 2022-09-04 PROCEDURE — 86850 RBC ANTIBODY SCREEN: CPT

## 2022-09-04 PROCEDURE — 99233 PR SUBSEQUENT HOSPITAL CARE,LEVL III: ICD-10-PCS | Mod: 57,,, | Performed by: THORACIC SURGERY (CARDIOTHORACIC VASCULAR SURGERY)

## 2022-09-04 PROCEDURE — 36415 COLL VENOUS BLD VENIPUNCTURE: CPT

## 2022-09-04 PROCEDURE — 99233 SBSQ HOSP IP/OBS HIGH 50: CPT | Mod: 57,,, | Performed by: THORACIC SURGERY (CARDIOTHORACIC VASCULAR SURGERY)

## 2022-09-04 PROCEDURE — 85730 THROMBOPLASTIN TIME PARTIAL: CPT | Performed by: INTERNAL MEDICINE

## 2022-09-04 RX ORDER — MUPIROCIN 20 MG/G
OINTMENT TOPICAL
Status: DISCONTINUED | OUTPATIENT
Start: 2022-09-04 | End: 2022-09-10 | Stop reason: HOSPADM

## 2022-09-04 RX ORDER — CEFAZOLIN SODIUM 2 G/50ML
2 SOLUTION INTRAVENOUS
Status: DISCONTINUED | OUTPATIENT
Start: 2022-09-04 | End: 2022-09-10 | Stop reason: HOSPADM

## 2022-09-04 RX ADMIN — HEPARIN SODIUM 23 UNITS/KG/HR: 10000 INJECTION, SOLUTION INTRAVENOUS at 02:09

## 2022-09-04 RX ADMIN — FAMOTIDINE 20 MG: 20 TABLET, FILM COATED ORAL at 10:09

## 2022-09-04 RX ADMIN — METOPROLOL SUCCINATE 12.5 MG: 25 TABLET, EXTENDED RELEASE ORAL at 10:09

## 2022-09-04 RX ADMIN — ATORVASTATIN CALCIUM 40 MG: 40 TABLET, FILM COATED ORAL at 09:09

## 2022-09-04 RX ADMIN — Medication 81 MG: at 10:09

## 2022-09-04 RX ADMIN — FAMOTIDINE 20 MG: 20 TABLET, FILM COATED ORAL at 09:09

## 2022-09-04 NOTE — PROGRESS NOTES
[facility]  Hospital Medicine Progress Note        Chief Complaint: Inpatient Follow-up for NSTEMI    HPI: This is a 66-year-old male with medical history of former cigarette smoker quit 2016 present to [facility] ED with complaint of chest pain that started  today 8/30/2022 around 10:30 AM.  Described the pain as pressure-like substernal, nonradiating and associated with diaphoresis, pain lasted for about 1 hour and improved with sublingual nitro spray given with EMS and again in the emergency room.  On arrival he was noted to be hemodynamically stable and afebrile and saturating above 95% on room air. EKG  sinus rhythm and showed T-wave inversion lateral leads. Chest x-ray showed normal cardiac silhouette and no parenchymal lung opacities. Labs notable for troponin 1.078,  elevated transaminases and COVID-19 positive.  He was given nitro paste and currently is chest pain-free.  Cardiology consulted and transferred to [facility] and referred to hospital medicine service for further evaluation and management.    Interval Hx:   Sitting in bed, no complaints. Aware surgical plan for tomorrow pending negative covid test   Denies chest pain, SOB.  No family at bedside  Case discussed with patient's nurse on the floor    Objective/physical exam:  General: In no acute distress, afebrile  Chest: Clear to auscultation bilaterally  Heart: RRR, +S1, S2, no appreciable murmur  Abdomen: Soft, nontender, BS +  MSK: Warm, no lower extremity edema, no clubbing or cyanosis  Neurologic: Alert and oriented x4, Cranial nerve II-XII intact, Strength 5/5 in all 4 extremities    VITAL SIGNS: 24 HRS MIN & MAX LAST   Temp  Min: 97.7 °F (36.5 °C)  Max: 98.2 °F (36.8 °C) 97.9 °F (36.6 °C)   BP  Min: 108/73  Max: 127/76 120/80   Pulse  Min: 60  Max: 78  66   Resp  Min: 17  Max: 18 18   SpO2  Min: 95 %  Max: 98 % 95 %       Recent Labs   Lab 09/01/22  0240 09/02/22  0401 09/03/22  0422   WBC 8.1 9.6 7.8   RBC 4.47*  4.37* 4.43*   HGB 13.4* 12.9* 13.2*   HCT 41.4* 40.5* 41.4*   MCV 92.6 92.7 93.5   MCH 30.0 29.5 29.8   MCHC 32.4* 31.9* 31.9*   RDW 14.0 13.8 13.8    328 317   MPV 11.3* 10.9* 10.5*       Recent Labs   Lab 08/31/22  0344 09/01/22  0300 09/02/22  0401 09/03/22  0422    138 138 141   K 4.2 4.4 4.0 4.6   CO2 21* 20* 21* 24   BUN 11.2 8.0* 11.9 13.4   CREATININE 0.77 0.71* 0.75 0.82   CALCIUM 9.3 9.3 9.4 9.5   MG 2.20 2.30 2.20  --    ALBUMIN 3.8 3.8 3.9 3.8   ALKPHOS 50 49 48 50   * 77* 53 47   AST 64* 47* 25 21   BILITOT 0.3 0.3 0.4 0.4          Microbiology Results (last 7 days)       ** No results found for the last 168 hours. **             See below for Radiology    Scheduled Med:   aspirin  81 mg Oral Daily    atorvastatin  40 mg Oral QHS    famotidine  20 mg Oral BID    metoprolol succinate  12.5 mg Oral Daily        Continuous Infusions:   heparin (porcine) in D5W 23 Units/kg/hr (09/03/22 2054)        PRN Meds:  sodium chloride, sodium chloride, acetaminophen, acetaminophen, acetaminophen, aluminum-magnesium hydroxide-simethicone, benzonatate, dextromethorphan-guaiFENesin  mg/5 ml, fentaNYL, heparin (porcine), heparin (PORCINE), heparin (PORCINE), iopamidoL, LIDOcaine HCL 10 mg/ml (1%), melatonin, midazolam, nitroGLYCERIN, nitroGLYCERIN, ondansetron, ondansetron, polyethylene glycol, prochlorperazine, senna-docusate 8.6-50 mg, simethicone, sodium chloride 0.9%, sodium chloride 0.9%, verapamiL       Assessment/Plan:  NSTEMI -  type 1- on heparin  New acute systolic heart failure with EF 42% and grade 1 diastolic dysfunction  COVID 19 postive- no respiratory symptoms at this time  Hyperlipidemia  Transaminitis- resolved       Currently chest pain-free, no shortness breath and saturating  above 95% on room air  Troponin trending up  1.078 --> 2.866--> 4.00--> 3.87  Transthoracic ECHO - The estimated ejection fraction is 42%. Regional wall motion abnormalities of the LAD and RCA  territories. Grade I left ventricular diastolic dysfunction.  8/31- Underwent LHC --> multivessel disease, consulted cardiothoracic surgery   Case personally discussed with cardiology Dr. David, due to the severity of the patient's CAD we all agreed for a 2nd opinion from Dr Jared Coleman evaluated the pt, recommended surgery Monday pending negative covid testing on Sunday   Greatly appreciate his input  Covid test ordered this am- results pending   MRSA PCR also ordered- results pending   NPO after MN  Received Aspirin 324 mg x1 on admission and now 81mg daily  On heparin drip post LHC , aPTT per heparin protocol   Cont Metoprolol succinate 12.5 mg daily  Liver enzyme normalized, will start atorvastatin tomorrow  A1c 5.9 and lipid panel with .   Abdominal US--> hepatic steatosis   Hepatitis viral panel-nonreactive   HIV- nonreactive  UDS positive for THC   Maintain covid isolation and precautions per CDC guideline  D-dimer was normal  CRP 2.19 and ferritin 69, both wnl  Morning CBC, CMP, Mag, PT/INR ordered for surgical day       VTE prophylaxis: Heparin drip    Patient condition:  Guarded    Anticipated discharge and Disposition:   TBD    Critical care note:  Critical care diagnosis:  NSTEMI type 1 on heparin drip  Critical care interventions: Hands-on evaluation, review of labs/radiographs/records and discussion with patient and family if present  Critical care time spent: 35 minutes        All diagnosis and differential diagnosis have been reviewed; assessment and plan has been documented; I have personally reviewed the labs and test results that are presently available; I have reviewed the patients medication list; I have reviewed the consulting providers response and recommendations. I have reviewed or attempted to review medical records based upon their availability    All of the patient's questions have been  addressed and answered. Patient's is agreeable to the above stated plan. I will  continue to monitor closely and make adjustments to medical management as needed.  _____________________________________________________________________    Nutrition Status:    Radiology:  Cardiac catheterization    The estimated blood loss was none.    The procedure log was documented by Documenter: Tom Lewis RN and   verified by Luis Daniel Maza MD.    Date: 8/31/2022  Time: 4:40 PM  Preprocedure diagnosis-NSTEMI  Postprocedure diagnosis-Obstructive CAD  Estimated blood loss-5 cc  Tissue removal-none  Complications-none    Procedures performed:  1. Ultrasound-guided right radial access  2. Coronary angiography  3. Left ventricular hemodynamics  4. IFR LCX 0.97, not significant  5. PTA prox LAD 2.5 balloon  4. TR band access site hemostasis    Findings:  1. Left main-short, normal  2. Lad-99% prox, 80% Mid, Large diagonal occluded with circumflex   collaterals  3. LCX-50% mid, IFR not significant  4. RCA-dominant, 80% distal involving PDA and PLB  5. EDP 15 mmHg    Procedure detail:  Ultrasound-guided right radial access obtained.  Sheath inserted.    Vasodilators and heparin administered.  Tiger catheter used for left   ventricular hemodynamics.  Catheter used for selective left and right   coronary angiography.  Patient was found to have multivessel disease.  I   decided to proceed with IFR interrogation of the circumflex.  EBU 3.5   guide was utilized to cannulate the left main.  Artery was treated with IC   nitro.   Wire was normalized in the aorta.  Interrogation of the   circumflex was not significant, pullback to the guide was 1.  Given the   circumflex was not significant I felt that if I could wire the diagonal I   would proceed with stenting of the LAD if not I would send for surgery   evaluation.  I placed corey blue wire into the LAD and pre-dilated the 99%   proximal LAD stenosis with a 2.5 balloon.  Subsequent angiography   demonstrated MARY ANNE 3 flow, residual 80% stenosis.  Attempts were made at    wiring the diagonal with a corey black but were unsuccessful.  Final   angiography demonstrated MARY ANNE 3 flow in the LAD with residual high-grade   stenosis,  of the large diagonal, collateral flow from the circumflex   to the diagonal.    Catheter was removed and a TR band was utilized for   access site hemostasis.    Plan:  1. Maximize medical management  2. Restart heparin in 2 hours   3. Consult CT surgery for bypass to the LAD, diagonal, PLB, PDA   4. If patient is turned down for surgery consider staged intervention to   LAD across the diagonal  5. Check out has been given the cardiology service and on-call Cardiology  6. CT surgery consult has been placed.        Bahman Riojas MD   09/04/2022

## 2022-09-04 NOTE — PLAN OF CARE
Problem: Adult Inpatient Plan of Care  Goal: Plan of Care Review  Outcome: Ongoing, Progressing  Goal: Patient-Specific Goal (Individualized)  Outcome: Ongoing, Progressing     Problem: Chest Pain  Goal: Resolution of Chest Pain Symptoms  Outcome: Ongoing, Progressing

## 2022-09-04 NOTE — PROGRESS NOTES
[facility]  Cardiology  Progress Note    Patient Name: Gustavo Cheung  MRN: [id number]  Admission Date: 8/30/2022  Hospital Length of Stay: 4 days  Code Status: Full Code   Attending Physician: Bahman Riojas MD   Primary Care Physician: No primary care provider on file.  Expected Discharge Date:   Principal Problem:Type 1 non-ST elevation myocardial infarction (NSTEMI)    Subjective:     Brief HPI:  Mr. Cheung is a 66 year old male who is unknown to [facility]. He presented to [facility] ED on 8.30.22 with complaints of chest pain that started yesterday. He describes the pain as pressure-like substernal, nonradiating with an association of diaphoresis. He reports that the pain lasted for about an hour and resolved with nitro SL provided by EMS. Upon arrival to the ED, his initial troponin was 1.078 and AST//104. He was also noted to be COVID positive. His UDS was positive for cannabis. His EKG demonstrated SR w/ T-wave inversion in lateral leads. He denies current CP, SOB, or palps. [facility] has been consulted to further evaluate the patient's CP and elevated troponin.     9.2.22: NAD. Denies CP, SOB, or palps. Heparin drip per protocol.   9.3.22: NAD. Denies CP, SOB, or palps. Continues on heparin drip per protocol.   9.4.22: NAD. Denies CP, SOB, or palps. Heparin drip continues. Potential plans for CABG tomorrow.    Hospital Course:  PMH: pre DM  PSH: hernia repair, right thigh & tendon repair surgery  Family History: Noncontributory  Social History: Ex smoker (quit in 2016). Reports occasional alcohol use (less than once/month). Denies drug use.      Previous Diagnostics:  [facility] 8.31.22  Left main-short, normal  Lad-99% prox, 80% Mid, Large diagonal occluded with circumflex collaterals  LCX-50% mid, IFR not significant  RCA-dominant, 80% distal involving PDA and PLB  EDP 15 mmHg     Maximize medical management  Consult CT surgery for bypass to the LAD, diagonal, PLB, PDA   If  patient is turned down for surgery consider staged intervention to LAD across the diagonal  Check out has been given the cardiology service and on-call Cardiology  CT surgery consult has been placed      Echo 8.31.22  The left ventricle is normal in size with concentric remodeling and mildly decreased systolic function.  The estimated ejection fraction is 42%.  Regional wall motion abnormalities of the LAD and RCA territories.  Grade I left ventricular diastolic dysfunction.  Normal right ventricular size with normal right ventricular systolic function.  The estimated PA systolic pressure is 14 mmHg    Review of Systems   Cardiovascular: Negative.  Negative for chest pain and palpitations.   Respiratory: Negative.  Negative for shortness of breath.    All other systems reviewed and are negative.    Objective:     Vital Signs (Most Recent):  Temp: 98.1 °F (36.7 °C) (09/04/22 1143)  Pulse: 76 (09/04/22 1143)  Resp: 18 (09/04/22 1143)  BP: 122/79 (09/04/22 1143)  SpO2: 98 % (09/04/22 1143)   Vital Signs (24h Range):  Temp:  [97.6 °F (36.4 °C)-98.2 °F (36.8 °C)] 98.1 °F (36.7 °C)  Pulse:  [60-78] 76  Resp:  [17-18] 18  SpO2:  [95 %-98 %] 98 %  BP: (109-127)/(73-81) 122/79     Weight: 73.4 kg (161 lb 13.1 oz)  Body mass index is 27.78 kg/m².    SpO2: 98 %  O2 Device (Oxygen Therapy): room air      Intake/Output Summary (Last 24 hours) at 9/4/2022 1232  Last data filed at 9/3/2022 1300  Gross per 24 hour   Intake 900 ml   Output --   Net 900 ml         Lines/Drains/Airways       Peripheral Intravenous Line  Duration                  Peripheral IV - Single Lumen 09/03/22 0230 20 G Left;Posterior Wrist 1 day                    Significant Labs:   Recent Results (from the past 72 hour(s))   PTT Heparin Monitoring    Collection Time: 09/01/22  8:32 PM   Result Value Ref Range    PTT Heparin Monitor 58.2 (H) 23.2 - 33.7 seconds   Magnesium    Collection Time: 09/02/22  4:01 AM   Result Value Ref Range    Magnesium Level 2.20  1.60 - 2.60 mg/dL   Comprehensive Metabolic Panel    Collection Time: 09/02/22  4:01 AM   Result Value Ref Range    Sodium Level 138 136 - 145 mmol/L    Potassium Level 4.0 3.5 - 5.1 mmol/L    Chloride 107 98 - 107 mmol/L    Carbon Dioxide 21 (L) 23 - 31 mmol/L    Glucose Level 85 82 - 115 mg/dL    Blood Urea Nitrogen 11.9 8.4 - 25.7 mg/dL    Creatinine 0.75 0.73 - 1.18 mg/dL    Calcium Level Total 9.4 8.8 - 10.0 mg/dL    Protein Total 6.0 5.8 - 7.6 gm/dL    Albumin Level 3.9 3.4 - 4.8 gm/dL    Globulin 2.1 (L) 2.4 - 3.5 gm/dL    Albumin/Globulin Ratio 1.9 1.1 - 2.0 ratio    Bilirubin Total 0.4 <=1.5 mg/dL    Alkaline Phosphatase 48 40 - 150 unit/L    Alanine Aminotransferase 53 0 - 55 unit/L    Aspartate Aminotransferase 25 5 - 34 unit/L    eGFR >60 mls/min/1.73/m2   PTT Heparin Monitoring    Collection Time: 09/02/22  4:01 AM   Result Value Ref Range    PTT Heparin Monitor 78.2 (H) 23.2 - 33.7 seconds   CBC with Differential    Collection Time: 09/02/22  4:01 AM   Result Value Ref Range    WBC 9.6 4.5 - 11.5 x10(3)/mcL    RBC 4.37 (L) 4.70 - 6.10 x10(6)/mcL    Hgb 12.9 (L) 14.0 - 18.0 gm/dL    Hct 40.5 (L) 42.0 - 52.0 %    MCV 92.7 80.0 - 94.0 fL    MCH 29.5 27.0 - 31.0 pg    MCHC 31.9 (L) 33.0 - 36.0 mg/dL    RDW 13.8 11.5 - 17.0 %    Platelet 328 130 - 400 x10(3)/mcL    MPV 10.9 (H) 7.4 - 10.4 fL    Neut % 60.4 %    Lymph % 27.8 %    Mono % 8.9 %    Eos % 2.0 %    Basophil % 0.6 %    Lymph # 2.66 0.6 - 4.6 x10(3)/mcL    Neut # 5.8 2.1 - 9.2 x10(3)/mcL    Mono # 0.85 0.1 - 1.3 x10(3)/mcL    Eos # 0.19 0 - 0.9 x10(3)/mcL    Baso # 0.06 0 - 0.2 x10(3)/mcL    IG# 0.03 0 - 0.04 x10(3)/mcL    IG% 0.3 %    NRBC% 0.0 %   PTT Heparin Monitoring    Collection Time: 09/02/22 10:39 AM   Result Value Ref Range    PTT Heparin Monitor 42.3 (H) 23.2 - 33.7 seconds   PTT Heparin Monitoring    Collection Time: 09/02/22  7:47 PM   Result Value Ref Range    PTT Heparin Monitor 117.6 (H) 23.2 - 33.7 seconds   ABORH RETYPE     Collection Time: 09/02/22  8:26 PM   Result Value Ref Range    ABORH Retype A POS    CBC Without Differential    Collection Time: 09/03/22  4:22 AM   Result Value Ref Range    WBC 7.8 4.5 - 11.5 x10(3)/mcL    RBC 4.43 (L) 4.70 - 6.10 x10(6)/mcL    Hgb 13.2 (L) 14.0 - 18.0 gm/dL    Hct 41.4 (L) 42.0 - 52.0 %    Platelet 317 130 - 400 x10(3)/mcL    MCV 93.5 80.0 - 94.0 fL    MCH 29.8 27.0 - 31.0 pg    MCHC 31.9 (L) 33.0 - 36.0 mg/dL    RDW 13.8 11.5 - 17.0 %    MPV 10.5 (H) 7.4 - 10.4 fL    NRBC% 0.0 %   Comprehensive Metabolic Panel    Collection Time: 09/03/22  4:22 AM   Result Value Ref Range    Sodium Level 141 136 - 145 mmol/L    Potassium Level 4.6 3.5 - 5.1 mmol/L    Chloride 108 (H) 98 - 107 mmol/L    Carbon Dioxide 24 23 - 31 mmol/L    Glucose Level 93 82 - 115 mg/dL    Blood Urea Nitrogen 13.4 8.4 - 25.7 mg/dL    Creatinine 0.82 0.73 - 1.18 mg/dL    Calcium Level Total 9.5 8.8 - 10.0 mg/dL    Protein Total 6.3 5.8 - 7.6 gm/dL    Albumin Level 3.8 3.4 - 4.8 gm/dL    Globulin 2.5 2.4 - 3.5 gm/dL    Albumin/Globulin Ratio 1.5 1.1 - 2.0 ratio    Bilirubin Total 0.4 <=1.5 mg/dL    Alkaline Phosphatase 50 40 - 150 unit/L    Alanine Aminotransferase 47 0 - 55 unit/L    Aspartate Aminotransferase 21 5 - 34 unit/L    eGFR >60 mls/min/1.73/m2   PTT Heparin Monitoring    Collection Time: 09/04/22  7:33 AM   Result Value Ref Range    PTT Heparin Monitor 77.9 (H) 23.2 - 33.7 seconds   Type & Screen    Collection Time: 09/04/22 10:02 AM   Result Value Ref Range    Group & Rh A POS     Indirect Selena GEL NEG        Telemetry: NSR    Physical Exam  Vitals and nursing note reviewed.   Deferred s/t active COVID 19 infection    Current Inpatient Medications:    Current Facility-Administered Medications:     0.9%  NaCl infusion (for blood administration), , Intravenous, Q24H PRN, ITA Jerez    0.9%  NaCl infusion (for blood administration), , Intravenous, Q24H PRN, ITA Jerez    acetaminophen tablet 1,000 mg, 1,000 mg,  Oral, Q6H PRN, Yomi Milligan MD, 1,000 mg at 08/31/22 1934    acetaminophen tablet 650 mg, 650 mg, Oral, Q4H PRN, Yomi Milligan MD    acetaminophen tablet 650 mg, 650 mg, Oral, Q4H PRN, Luis Daniel Maza MD, 650 mg at 09/03/22 2042    aluminum-magnesium hydroxide-simethicone 200-200-20 mg/5 mL suspension 30 mL, 30 mL, Oral, QID PRN, Yomi Milligan MD    aspirin chewable tablet 81 mg, 81 mg, Oral, Daily, Yomi Milligan MD, 81 mg at 09/04/22 1040    atorvastatin tablet 40 mg, 40 mg, Oral, QHS, Bahman Riojas MD, 40 mg at 09/03/22 2042    benzonatate capsule 200 mg, 200 mg, Oral, TID PRN, Yomi Milligan MD    cefazolin (ANCEF) 2 gram in dextrose 5% 50 mL IVPB (premix), 2 g, Intravenous, On Call Procedure, ITA Jerez    dextromethorphan-guaiFENesin  mg/5 ml liquid 5 mL, 5 mL, Oral, Q4H PRN, Yomi Milligan MD    famotidine tablet 20 mg, 20 mg, Oral, BID, Yomi Milligan MD, 20 mg at 09/04/22 1040    fentaNYL 50 mcg/mL injection, , , PRN, Luis Daniel Maza MD, 50 mcg at 08/31/22 1546    heparin (porcine) injection, , , PRN, Luis Daniel Maza MD, 5,000 Units at 08/31/22 1549    heparin 25,000 units in dextrose 5% (100 units/ml) IV bolus from bag - ADDITIONAL PRN BOLUS - 30 units/kg (max bolus 4000 units), 30 Units/kg (Adjusted), Intravenous, PRN, SHELBY Coker, 1,880 Units at 09/01/22 2158    heparin 25,000 units in dextrose 5% (100 units/ml) IV bolus from bag - ADDITIONAL PRN BOLUS - 60 units/kg (max bolus 4000 units), 60 Units/kg (Adjusted), Intravenous, PRN, SHELBY Coker, 3,760 Units at 09/02/22 1338    heparin 25,000 units in dextrose 5% 250 mL (100 units/mL) infusion LOW INTENSITY nomogram - [facility], 0-40 Units/kg/hr (Adjusted), Intravenous, Continuous, SHELBY Coker, Last Rate: 14.4 mL/hr at 09/03/22 2054, 23 Units/kg/hr at 09/03/22 2054    iopamidoL (ISOVUE-370) injection, , , PRN, Luis Daniel Maza MD, 100 mL at 08/31/22 1635    LIDOcaine HCL 10 mg/ml (1%) injection, , , PRN, Luis Daniel SMITH  MD Link, 10 mL at 08/31/22 1546    melatonin tablet 6 mg, 6 mg, Oral, Nightly PRN, Yomi Milligan MD    metoprolol succinate (TOPROL-XL) 24 hr split tablet 12.5 mg, 12.5 mg, Oral, Daily, Yomi Milligan MD, 12.5 mg at 09/04/22 1040    midazolam (VERSED) 1 mg/mL injection, , , PRN, Luis Daniel Maza MD, 1 mg at 08/31/22 1551    mupirocin 2 % ointment, , Nasal, On Call Procedure, ITA Jerez    nitroGLYCERIN injection, , , PRN, Luis Daniel Maza MD, 200 mcg at 08/31/22 1603    nitroGLYCERIN SL tablet 0.4 mg, 0.4 mg, Sublingual, Q5 Min PRN, Yomi Milligan MD    ondansetron disintegrating tablet 8 mg, 8 mg, Oral, Q8H PRN, Luis Daniel Maza MD    ondansetron injection 4 mg, 4 mg, Intravenous, Q4H PRN, Yomi Milligan MD    polyethylene glycol packet 17 g, 17 g, Oral, BID PRN, Yomi Milligan MD    prochlorperazine injection Soln 5 mg, 5 mg, Intravenous, Q6H PRN, Yomi Milligan MD    senna-docusate 8.6-50 mg per tablet 1 tablet, 1 tablet, Oral, BID PRN, Yomi Milligan MD    simethicone chewable tablet 80 mg, 1 tablet, Oral, QID PRN, Yomi Milligan MD    sodium chloride 0.9% flush 10 mL, 10 mL, Intravenous, PRN, Yomi Milligan MD    sodium chloride 0.9% flush 10 mL, 10 mL, Intravenous, PRN, Mouna Rm, SHELBY    verapamiL injection, , , PRN, Luis Daniel Maza MD, 2.5 mg at 08/31/22 1548    VTE Risk Mitigation (From admission, onward)           Ordered     heparin 25,000 units in dextrose 5% (100 units/ml) IV bolus from bag - ADDITIONAL PRN BOLUS - 60 units/kg (max bolus 4000 units)  As needed (PRN)        Question:  Heparin Infusion Adjustment (DO NOT MODIFY ANSWER)  Answer:  [email or web address]\Images\Pharmacy\HeparinInfusions\heparin LOW INTENSITY nomogram for [facility] [id number].pdf    08/31/22 1647     heparin 25,000 units in dextrose 5% (100 units/ml) IV bolus from bag - ADDITIONAL PRN BOLUS - 30 units/kg (max bolus 4000 units)  As needed (PRN)        Question:  Heparin Infusion Adjustment (DO NOT MODIFY ANSWER)  Answer:   [email or web address]\Images\Pharmacy\HeparinInfusions\heparin LOW INTENSITY nomogram for [facility] [id number].pdf    08/31/22 1642     heparin 25,000 units in dextrose 5% 250 mL (100 units/mL) infusion LOW INTENSITY nomogram - [facility]  Continuous        Question Answer Comment   Begin at (in units/kg/hr) 12    Heparin Infusion Adjustment (DO NOT MODIFY ANSWER) [email or web address]\Images\Pharmacy\HeparinInfusions\heparin LOW INTENSITY nomogram for [facility] [id number]        08/31/22 1642     heparin (porcine) injection  As needed (PRN)         08/31/22 1550     IP VTE LOW RISK PATIENT  Once         08/30/22 2316     Place sequential compression device  Until discontinued         08/30/22 2316                    Assessment:   NSTEMI -Type 1    - Trop 4.00 trending down    - EKG changes     - Currently CP free  Multi Vessel CAD    - Left main-normal; LAD-99% prox, mid, large diagonal occluded with circumflex collaterals; Left CX-50% mid, IFR not significant; RCA-dominant, 80% distal involving PDA and PLB; EDP 15 mm HG.  Acute Systolic/Diastolic Heart Failure-euvolemic on exam    - EF is 42%    - Grade I DD    - regional wall motion abnormalities of the LAD and RCA territories  Active COVID 19 infection  Transaminitis  Pre DM  No Hx of GIB    Plan:   Continue heparin gtt.  Continue Asa/BB/Statin  Consider adding ARB s/t decrease in LVEF  Appreciate recs from [facility]. Potential plans for CABG tomorrow pending COVID status.  Will continue to follow.       Mouna Rm, SHELBY  Cardiology  [facility]  09/04/2022

## 2022-09-04 NOTE — PROGRESS NOTES
CT SURGERY PROGRESS NOTE  Gustavo Cheung  67 y.o.  1955    Patients Procedure: Procedure(s) (LRB):  Left heart cath (Left)  Instantaneous Wave-Free Ratio (IFR) (N/A)  Angioplasty-coronary  Percutaneous coronary intervention (N/A)    Subjective  Interval History: NAEO and asymptomatic. Discussed surgery tomorrow. Told him about preoperative preparation. Heparin dc at midnight. NPO at midnight.    Review of Systems   Constitutional: Negative.    Respiratory:  Negative for cough, sputum production and shortness of breath.    Cardiovascular:  Negative for chest pain, palpitations, claudication and leg swelling.   Gastrointestinal:  Negative for abdominal pain, nausea and vomiting.   Genitourinary: Negative.    Skin: Negative.         Incision C/D/I     Medication List  Infusions   heparin (porcine) in D5W 23 Units/kg/hr (09/03/22 2054)     Scheduled   aspirin  81 mg Oral Daily    atorvastatin  40 mg Oral QHS    famotidine  20 mg Oral BID    metoprolol succinate  12.5 mg Oral Daily       Objective:  Recent Vitals:  Temp:  [97.6 °F (36.4 °C)-98.2 °F (36.8 °C)] 97.6 °F (36.4 °C)  Pulse:  [60-78] 74  Resp:  [17-18] 18  SpO2:  [95 %-98 %] 96 %  BP: (108-127)/(73-81) 109/73    Physical Exam  Vitals and nursing note reviewed.   Constitutional:       General: He is awake. He is not in acute distress.     Appearance: Normal appearance. He is not toxic-appearing or diaphoretic.   HENT:      Head: Normocephalic and atraumatic.   Eyes:      Extraocular Movements: Extraocular movements intact.      Pupils: Pupils are equal, round, and reactive to light.   Cardiovascular:      Rate and Rhythm: Normal rate and regular rhythm.      Pulses: Normal pulses.           Radial pulses are 2+ on the right side and 2+ on the left side.        Dorsalis pedis pulses are 2+ on the right side and 2+ on the left side.      Heart sounds: Normal heart sounds.   Pulmonary:      Effort: Pulmonary effort is normal.      Breath sounds: Normal breath  sounds.   Musculoskeletal:      Right lower leg: No edema.      Left lower leg: No edema.   Skin:     General: Skin is warm and dry.      Comments: INCISION C/D/I   Neurological:      General: No focal deficit present.      Mental Status: He is alert and oriented to person, place, and time.   Psychiatric:         Mood and Affect: Mood and affect normal.        I/O last 24 hrs:  Intake/Output - Last 3 Shifts         09/02 0700 09/03 0659 09/03 0700 09/04 0659 09/04 0700 09/05 0659    P.O. 720 900     Total Intake(mL/kg) 720 (9.8) 900 (12.3)     Urine (mL/kg/hr) 1150 (0.7)      Total Output 1150      Net -430 +900            Stool Occurrence  3 x             Labs  All pertinent labs from the last 24 hours have been reviewed.      Imaging:   I have reviewed all pertinent imaging results/findings within the past 24 hours.        ASSESSMENT/PLAN:  - pre-operative testing and prep  - NPO at midnight  - stop heparin at midnight  - surgery tomorrow morning    Case and plan of care discussed with MD Tom Sharma PA-C

## 2022-09-05 ENCOUNTER — ANESTHESIA (OUTPATIENT)
Dept: SURGERY | Facility: HOSPITAL | Age: 67
DRG: 231 | End: 2022-09-05
Payer: MEDICARE

## 2022-09-05 LAB
ABO + RH BLD: NORMAL
ALBUMIN SERPL-MCNC: 3.9 GM/DL (ref 3.4–4.8)
ALBUMIN/GLOB SERPL: 1.3 RATIO (ref 1.1–2)
ALP SERPL-CCNC: 55 UNIT/L (ref 40–150)
ALT SERPL-CCNC: 35 UNIT/L (ref 0–55)
ANION GAP SERPL CALC-SCNC: 7 MEQ/L
APTT PPP: 27.9 SECONDS (ref 23.2–33.7)
AST SERPL-CCNC: 17 UNIT/L (ref 5–34)
BASOPHILS # BLD AUTO: 0.05 X10(3)/MCL (ref 0–0.2)
BASOPHILS # BLD AUTO: 0.06 X10(3)/MCL (ref 0–0.2)
BASOPHILS NFR BLD AUTO: 0.4 %
BASOPHILS NFR BLD AUTO: 0.7 %
BILIRUBIN DIRECT+TOT PNL SERPL-MCNC: 0.3 MG/DL
BLD PROD TYP BPU: NORMAL
BLOOD UNIT EXPIRATION DATE: NORMAL
BLOOD UNIT TYPE CODE: 6200
BUN SERPL-MCNC: 11.1 MG/DL (ref 8.4–25.7)
BUN SERPL-MCNC: 13.9 MG/DL (ref 8.4–25.7)
CALCIUM SERPL-MCNC: 10.1 MG/DL (ref 8.8–10)
CALCIUM SERPL-MCNC: 9.8 MG/DL (ref 8.8–10)
CHLORIDE SERPL-SCNC: 107 MMOL/L (ref 98–107)
CHLORIDE SERPL-SCNC: 115 MMOL/L (ref 98–107)
CO2 SERPL-SCNC: 19 MMOL/L (ref 23–31)
CO2 SERPL-SCNC: 22 MMOL/L (ref 23–31)
CREAT SERPL-MCNC: 0.81 MG/DL (ref 0.73–1.18)
CREAT SERPL-MCNC: 0.85 MG/DL (ref 0.73–1.18)
CREAT/UREA NIT SERPL: 13
CROSSMATCH INTERPRETATION: NORMAL
DISPENSE STATUS: NORMAL
EOSINOPHIL # BLD AUTO: 0.23 X10(3)/MCL (ref 0–0.9)
EOSINOPHIL # BLD AUTO: 0.3 X10(3)/MCL (ref 0–0.9)
EOSINOPHIL NFR BLD AUTO: 2.2 %
EOSINOPHIL NFR BLD AUTO: 3.3 %
ERYTHROCYTE [DISTWIDTH] IN BLOOD BY AUTOMATED COUNT: 13.6 % (ref 11.5–17)
ERYTHROCYTE [DISTWIDTH] IN BLOOD BY AUTOMATED COUNT: 13.7 % (ref 11.5–17)
GFR SERPLBLD CREATININE-BSD FMLA CKD-EPI: >60 MLS/MIN/1.73/M2
GFR SERPLBLD CREATININE-BSD FMLA CKD-EPI: >60 MLS/MIN/1.73/M2
GLOBULIN SER-MCNC: 3.1 GM/DL (ref 2.4–3.5)
GLUCOSE SERPL-MCNC: 158 MG/DL (ref 82–115)
GLUCOSE SERPL-MCNC: 97 MG/DL (ref 82–115)
HCT VFR BLD AUTO: 29.6 % (ref 42–52)
HCT VFR BLD AUTO: 31.3 % (ref 42–52)
HCT VFR BLD AUTO: 41.6 % (ref 42–52)
HGB BLD-MCNC: 13.3 GM/DL (ref 14–18)
HGB BLD-MCNC: 9.3 GM/DL (ref 14–18)
HGB BLD-MCNC: 9.7 GM/DL (ref 14–18)
IMM GRANULOCYTES # BLD AUTO: 0.03 X10(3)/MCL (ref 0–0.04)
IMM GRANULOCYTES # BLD AUTO: 0.14 X10(3)/MCL (ref 0–0.04)
IMM GRANULOCYTES NFR BLD AUTO: 0.4 %
IMM GRANULOCYTES NFR BLD AUTO: 1 %
INR BLD: 0.99 (ref 0–1.3)
INR BLD: 1.47 (ref 0–1.3)
LYMPHOCYTES # BLD AUTO: 1.77 X10(3)/MCL (ref 0.6–4.6)
LYMPHOCYTES # BLD AUTO: 2.33 X10(3)/MCL (ref 0.6–4.6)
LYMPHOCYTES NFR BLD AUTO: 16.9 %
LYMPHOCYTES NFR BLD AUTO: 25.5 %
MAGNESIUM SERPL-MCNC: 2.3 MG/DL (ref 1.6–2.6)
MCH RBC QN AUTO: 29.4 PG (ref 27–31)
MCH RBC QN AUTO: 29.6 PG (ref 27–31)
MCHC RBC AUTO-ENTMCNC: 31 MG/DL (ref 33–36)
MCHC RBC AUTO-ENTMCNC: 32 MG/DL (ref 33–36)
MCV RBC AUTO: 92.7 FL (ref 80–94)
MCV RBC AUTO: 94.8 FL (ref 80–94)
MONOCYTES # BLD AUTO: 0.78 X10(3)/MCL (ref 0.1–1.3)
MONOCYTES # BLD AUTO: 0.86 X10(3)/MCL (ref 0.1–1.3)
MONOCYTES NFR BLD AUTO: 11.2 %
MONOCYTES NFR BLD AUTO: 6.2 %
NEUTROPHILS # BLD AUTO: 10.1 X10(3)/MCL (ref 2.1–9.2)
NEUTROPHILS # BLD AUTO: 4.1 X10(3)/MCL (ref 2.1–9.2)
NEUTROPHILS NFR BLD AUTO: 58.9 %
NEUTROPHILS NFR BLD AUTO: 73.3 %
NRBC BLD AUTO-RTO: 0 %
NRBC BLD AUTO-RTO: 0 %
PCO2 BLDA: 37 MMHG
PCO2 BLDA: 40 MMHG
PH SMN: 7.36 [PH]
PH SMN: 7.37 [PH]
PLATELET # BLD AUTO: 221 X10(3)/MCL (ref 130–400)
PLATELET # BLD AUTO: 332 X10(3)/MCL (ref 130–400)
PMV BLD AUTO: 10.8 FL (ref 7.4–10.4)
PMV BLD AUTO: 11.2 FL (ref 7.4–10.4)
PO2 BLDA: 106 MMHG
PO2 BLDA: 84 MMHG
POC BASE DEFICIT: -2 MMOL/L
POC BASE DEFICIT: -4.1 MMOL/L
POC HCO3: 20.9 MMOL/L
POC HCO3: 23.1 MMOL/L
POC IONIZED CALCIUM: 1.26 MMOL/L (ref 1.12–1.23)
POC IONIZED CALCIUM: 1.54 MMOL/L (ref 0.79–1.4)
POC SATURATED O2: 96 %
POC SATURATED O2: 98 %
POC TEMPERATURE: 37 C
POC TEMPERATURE: 37 C
POCT GLUCOSE: 110 MG/DL (ref 70–110)
POCT GLUCOSE: 113 MG/DL (ref 70–110)
POCT GLUCOSE: 118 MG/DL (ref 70–110)
POCT GLUCOSE: 122 MG/DL (ref 70–110)
POCT GLUCOSE: 122 MG/DL (ref 70–110)
POCT GLUCOSE: 125 MG/DL (ref 70–110)
POCT GLUCOSE: 125 MG/DL (ref 70–110)
POCT GLUCOSE: 126 MG/DL (ref 70–110)
POCT GLUCOSE: 127 MG/DL (ref 70–110)
POCT GLUCOSE: 157 MG/DL (ref 70–110)
POCT GLUCOSE: 170 MG/DL (ref 70–110)
POCT GLUCOSE: 94 MG/DL (ref 70–110)
POCT GLUCOSE: 98 MG/DL (ref 70–110)
POTASSIUM BLD-SCNC: 3.6 MMOL/L
POTASSIUM BLD-SCNC: 3.7 MMOL/L
POTASSIUM SERPL-SCNC: 4 MMOL/L (ref 3.5–5.1)
POTASSIUM SERPL-SCNC: 4.2 MMOL/L (ref 3.5–5.1)
POTASSIUM SERPL-SCNC: 4.3 MMOL/L (ref 3.5–5.1)
PROT SERPL-MCNC: 7 GM/DL (ref 5.8–7.6)
PROTHROMBIN TIME: 13 SECONDS (ref 12.5–14.5)
PROTHROMBIN TIME: 17.6 SECONDS (ref 12.5–14.5)
RBC # BLD AUTO: 3.3 X10(6)/MCL (ref 4.7–6.1)
RBC # BLD AUTO: 4.49 X10(6)/MCL (ref 4.7–6.1)
SODIUM BLD-SCNC: 140 MMOL/L
SODIUM BLD-SCNC: 140 MMOL/L
SODIUM SERPL-SCNC: 139 MMOL/L (ref 136–145)
SODIUM SERPL-SCNC: 141 MMOL/L (ref 136–145)
SPECIMEN SOURCE: ABNORMAL
SPECIMEN SOURCE: ABNORMAL
UNIT NUMBER: NORMAL
WBC # SPEC AUTO: 13.8 X10(3)/MCL (ref 4.5–11.5)
WBC # SPEC AUTO: 6.9 X10(3)/MCL (ref 4.5–11.5)

## 2022-09-05 PROCEDURE — 33517 CABG ARTERY-VEIN SINGLE: CPT | Mod: ,,, | Performed by: THORACIC SURGERY (CARDIOTHORACIC VASCULAR SURGERY)

## 2022-09-05 PROCEDURE — 63600175 PHARM REV CODE 636 W HCPCS: Mod: JG | Performed by: INTERNAL MEDICINE

## 2022-09-05 PROCEDURE — C1729 CATH, DRAINAGE: HCPCS | Performed by: THORACIC SURGERY (CARDIOTHORACIC VASCULAR SURGERY)

## 2022-09-05 PROCEDURE — 33517 PR CABG, ARTERY-VEIN, SINGLE: ICD-10-PCS | Mod: AS,,, | Performed by: PHYSICIAN ASSISTANT

## 2022-09-05 PROCEDURE — 36415 COLL VENOUS BLD VENIPUNCTURE: CPT | Performed by: THORACIC SURGERY (CARDIOTHORACIC VASCULAR SURGERY)

## 2022-09-05 PROCEDURE — 63600175 PHARM REV CODE 636 W HCPCS: Performed by: NURSE ANESTHETIST, CERTIFIED REGISTERED

## 2022-09-05 PROCEDURE — 37000008 HC ANESTHESIA 1ST 15 MINUTES: Performed by: THORACIC SURGERY (CARDIOTHORACIC VASCULAR SURGERY)

## 2022-09-05 PROCEDURE — 85014 HEMATOCRIT: CPT | Performed by: INTERNAL MEDICINE

## 2022-09-05 PROCEDURE — 83735 ASSAY OF MAGNESIUM: CPT | Performed by: INTERNAL MEDICINE

## 2022-09-05 PROCEDURE — 94002 VENT MGMT INPAT INIT DAY: CPT

## 2022-09-05 PROCEDURE — C1713 ANCHOR/SCREW BN/BN,TIS/BN: HCPCS | Performed by: THORACIC SURGERY (CARDIOTHORACIC VASCULAR SURGERY)

## 2022-09-05 PROCEDURE — 82803 BLOOD GASES ANY COMBINATION: CPT

## 2022-09-05 PROCEDURE — 33533 PR CABG, ARTERIAL, SINGLE: ICD-10-PCS | Mod: ,,, | Performed by: THORACIC SURGERY (CARDIOTHORACIC VASCULAR SURGERY)

## 2022-09-05 PROCEDURE — 37799 UNLISTED PX VASCULAR SURGERY: CPT

## 2022-09-05 PROCEDURE — 99900035 HC TECH TIME PER 15 MIN (STAT)

## 2022-09-05 PROCEDURE — C1894 INTRO/SHEATH, NON-LASER: HCPCS | Performed by: THORACIC SURGERY (CARDIOTHORACIC VASCULAR SURGERY)

## 2022-09-05 PROCEDURE — 63600175 PHARM REV CODE 636 W HCPCS: Performed by: PHYSICIAN ASSISTANT

## 2022-09-05 PROCEDURE — P9045 ALBUMIN (HUMAN), 5%, 250 ML: HCPCS | Mod: JG | Performed by: PHYSICIAN ASSISTANT

## 2022-09-05 PROCEDURE — A4216 STERILE WATER/SALINE, 10 ML: HCPCS | Performed by: NURSE ANESTHETIST, CERTIFIED REGISTERED

## 2022-09-05 PROCEDURE — 33533 CABG ARTERIAL SINGLE: CPT | Mod: AS,,, | Performed by: PHYSICIAN ASSISTANT

## 2022-09-05 PROCEDURE — 25000003 PHARM REV CODE 250: Performed by: PHYSICIAN ASSISTANT

## 2022-09-05 PROCEDURE — 36000713 HC OR TIME LEV V EA ADD 15 MIN: Performed by: THORACIC SURGERY (CARDIOTHORACIC VASCULAR SURGERY)

## 2022-09-05 PROCEDURE — 20000000 HC ICU ROOM

## 2022-09-05 PROCEDURE — 63600175 PHARM REV CODE 636 W HCPCS: Performed by: THORACIC SURGERY (CARDIOTHORACIC VASCULAR SURGERY)

## 2022-09-05 PROCEDURE — 36620 INSERTION CATHETER ARTERY: CPT

## 2022-09-05 PROCEDURE — 27000221 HC OXYGEN, UP TO 24 HOURS

## 2022-09-05 PROCEDURE — 25000003 PHARM REV CODE 250: Performed by: INTERNAL MEDICINE

## 2022-09-05 PROCEDURE — 27000207 HC ISOLATION

## 2022-09-05 PROCEDURE — C9248 INJ, CLEVIDIPINE BUTYRATE: HCPCS | Mod: JG | Performed by: INTERNAL MEDICINE

## 2022-09-05 PROCEDURE — 85730 THROMBOPLASTIN TIME PARTIAL: CPT | Performed by: THORACIC SURGERY (CARDIOTHORACIC VASCULAR SURGERY)

## 2022-09-05 PROCEDURE — 85610 PROTHROMBIN TIME: CPT | Performed by: THORACIC SURGERY (CARDIOTHORACIC VASCULAR SURGERY)

## 2022-09-05 PROCEDURE — S5010 5% DEXTROSE AND 0.45% SALINE: HCPCS | Performed by: PHYSICIAN ASSISTANT

## 2022-09-05 PROCEDURE — 84075 ASSAY ALKALINE PHOSPHATASE: CPT | Performed by: INTERNAL MEDICINE

## 2022-09-05 PROCEDURE — 33517 CABG ARTERY-VEIN SINGLE: CPT | Mod: AS,,, | Performed by: PHYSICIAN ASSISTANT

## 2022-09-05 PROCEDURE — 36000712 HC OR TIME LEV V 1ST 15 MIN: Performed by: THORACIC SURGERY (CARDIOTHORACIC VASCULAR SURGERY)

## 2022-09-05 PROCEDURE — 85025 COMPLETE CBC W/AUTO DIFF WBC: CPT | Performed by: INTERNAL MEDICINE

## 2022-09-05 PROCEDURE — 33533 CABG ARTERIAL SINGLE: CPT | Mod: ,,, | Performed by: THORACIC SURGERY (CARDIOTHORACIC VASCULAR SURGERY)

## 2022-09-05 PROCEDURE — 94761 N-INVAS EAR/PLS OXIMETRY MLT: CPT

## 2022-09-05 PROCEDURE — 33533 PR CABG, ARTERIAL, SINGLE: ICD-10-PCS | Mod: AS,,, | Performed by: PHYSICIAN ASSISTANT

## 2022-09-05 PROCEDURE — 25000003 PHARM REV CODE 250: Performed by: THORACIC SURGERY (CARDIOTHORACIC VASCULAR SURGERY)

## 2022-09-05 PROCEDURE — C1751 CATH, INF, PER/CENT/MIDLINE: HCPCS

## 2022-09-05 PROCEDURE — 86850 RBC ANTIBODY SCREEN: CPT | Performed by: INTERNAL MEDICINE

## 2022-09-05 PROCEDURE — 85610 PROTHROMBIN TIME: CPT | Performed by: INTERNAL MEDICINE

## 2022-09-05 PROCEDURE — 33517 PR CABG, ARTERY-VEIN, SINGLE: ICD-10-PCS | Mod: ,,, | Performed by: THORACIC SURGERY (CARDIOTHORACIC VASCULAR SURGERY)

## 2022-09-05 PROCEDURE — 63600175 PHARM REV CODE 636 W HCPCS: Performed by: INTERNAL MEDICINE

## 2022-09-05 PROCEDURE — 33508 ENDOSCOPIC VEIN HARVEST: CPT | Mod: 59,,, | Performed by: THORACIC SURGERY (CARDIOTHORACIC VASCULAR SURGERY)

## 2022-09-05 PROCEDURE — 25000003 PHARM REV CODE 250: Performed by: NURSE ANESTHETIST, CERTIFIED REGISTERED

## 2022-09-05 PROCEDURE — 27201423 OPTIME MED/SURG SUP & DEVICES STERILE SUPPLY: Performed by: THORACIC SURGERY (CARDIOTHORACIC VASCULAR SURGERY)

## 2022-09-05 PROCEDURE — 80053 COMPREHEN METABOLIC PANEL: CPT | Performed by: INTERNAL MEDICINE

## 2022-09-05 PROCEDURE — 36415 COLL VENOUS BLD VENIPUNCTURE: CPT | Performed by: INTERNAL MEDICINE

## 2022-09-05 PROCEDURE — 84132 ASSAY OF SERUM POTASSIUM: CPT | Performed by: INTERNAL MEDICINE

## 2022-09-05 PROCEDURE — 37000009 HC ANESTHESIA EA ADD 15 MINS: Performed by: THORACIC SURGERY (CARDIOTHORACIC VASCULAR SURGERY)

## 2022-09-05 PROCEDURE — 85025 COMPLETE CBC W/AUTO DIFF WBC: CPT | Performed by: THORACIC SURGERY (CARDIOTHORACIC VASCULAR SURGERY)

## 2022-09-05 PROCEDURE — 33508 PR ENDOSCOPY W/VIDEO-ASST VEIN HARVEST,CABG: ICD-10-PCS | Mod: 59,,, | Performed by: THORACIC SURGERY (CARDIOTHORACIC VASCULAR SURGERY)

## 2022-09-05 DEVICE — SCREW SD LOCKING 2.3X12MM: Type: IMPLANTABLE DEVICE | Site: STERNUM | Status: FUNCTIONAL

## 2022-09-05 DEVICE — SCREW SD LOCKING 2.3X16MM: Type: IMPLANTABLE DEVICE | Site: STERNUM | Status: FUNCTIONAL

## 2022-09-05 DEVICE — SCREW SD LOCKING 2.3X14MM: Type: IMPLANTABLE DEVICE | Site: STERNUM | Status: FUNCTIONAL

## 2022-09-05 DEVICE — PLATE MANUBRIUM LOW PROFILE: Type: IMPLANTABLE DEVICE | Site: STERNUM | Status: FUNCTIONAL

## 2022-09-05 RX ORDER — CALCIUM GLUCONATE 20 MG/ML
2 INJECTION, SOLUTION INTRAVENOUS
Status: DISCONTINUED | OUTPATIENT
Start: 2022-09-05 | End: 2022-09-06

## 2022-09-05 RX ORDER — MIDAZOLAM HYDROCHLORIDE 5 MG/ML
INJECTION INTRAMUSCULAR; INTRAVENOUS
Status: DISCONTINUED | OUTPATIENT
Start: 2022-09-05 | End: 2022-09-05

## 2022-09-05 RX ORDER — INDOMETHACIN 25 MG/1
CAPSULE ORAL
Status: DISCONTINUED | OUTPATIENT
Start: 2022-09-05 | End: 2022-09-05

## 2022-09-05 RX ORDER — ASPIRIN 81 MG/1
81 TABLET ORAL DAILY
Status: DISCONTINUED | OUTPATIENT
Start: 2022-09-06 | End: 2022-09-05

## 2022-09-05 RX ORDER — DEXTROSE MONOHYDRATE AND SODIUM CHLORIDE 5; .45 G/100ML; G/100ML
INJECTION, SOLUTION INTRAVENOUS CONTINUOUS
Status: DISCONTINUED | OUTPATIENT
Start: 2022-09-05 | End: 2022-09-06

## 2022-09-05 RX ORDER — CALCIUM GLUCONATE 20 MG/ML
3 INJECTION, SOLUTION INTRAVENOUS
Status: DISCONTINUED | OUTPATIENT
Start: 2022-09-05 | End: 2022-09-06

## 2022-09-05 RX ORDER — PROTAMINE SULFATE 10 MG/ML
INJECTION, SOLUTION INTRAVENOUS
Status: DISCONTINUED | OUTPATIENT
Start: 2022-09-05 | End: 2022-09-05

## 2022-09-05 RX ORDER — ACETAMINOPHEN 650 MG/20.3ML
650 LIQUID ORAL EVERY 6 HOURS PRN
Status: DISCONTINUED | OUTPATIENT
Start: 2022-09-05 | End: 2022-09-10 | Stop reason: HOSPADM

## 2022-09-05 RX ORDER — ETOMIDATE 2 MG/ML
INJECTION INTRAVENOUS
Status: DISCONTINUED | OUTPATIENT
Start: 2022-09-05 | End: 2022-09-05

## 2022-09-05 RX ORDER — SUCRALFATE 1 G/1
1 TABLET ORAL
Status: DISCONTINUED | OUTPATIENT
Start: 2022-09-06 | End: 2022-09-10 | Stop reason: HOSPADM

## 2022-09-05 RX ORDER — METOCLOPRAMIDE HYDROCHLORIDE 5 MG/ML
5 INJECTION INTRAMUSCULAR; INTRAVENOUS EVERY 6 HOURS PRN
Status: DISCONTINUED | OUTPATIENT
Start: 2022-09-05 | End: 2022-09-10 | Stop reason: HOSPADM

## 2022-09-05 RX ORDER — CALCIUM CHLORIDE INJECTION 100 MG/ML
INJECTION, SOLUTION INTRAVENOUS
Status: DISCONTINUED | OUTPATIENT
Start: 2022-09-05 | End: 2022-09-05

## 2022-09-05 RX ORDER — METOPROLOL TARTRATE 25 MG/1
12.5 TABLET ORAL 2 TIMES DAILY
Status: DISCONTINUED | OUTPATIENT
Start: 2022-09-06 | End: 2022-09-08

## 2022-09-05 RX ORDER — FENTANYL CITRATE 50 UG/ML
INJECTION, SOLUTION INTRAMUSCULAR; INTRAVENOUS
Status: DISCONTINUED | OUTPATIENT
Start: 2022-09-05 | End: 2022-09-05

## 2022-09-05 RX ORDER — OXYCODONE HYDROCHLORIDE 5 MG/1
5 TABLET ORAL EVERY 4 HOURS PRN
Status: DISCONTINUED | OUTPATIENT
Start: 2022-09-05 | End: 2022-09-10 | Stop reason: HOSPADM

## 2022-09-05 RX ORDER — MAGNESIUM SULFATE HEPTAHYDRATE 40 MG/ML
2 INJECTION, SOLUTION INTRAVENOUS
Status: DISCONTINUED | OUTPATIENT
Start: 2022-09-05 | End: 2022-09-10 | Stop reason: HOSPADM

## 2022-09-05 RX ORDER — CALCIUM CHLORIDE INJECTION 100 MG/ML
INJECTION, SOLUTION INTRAVENOUS
Status: DISCONTINUED | OUTPATIENT
Start: 2022-09-05 | End: 2022-09-05 | Stop reason: HOSPADM

## 2022-09-05 RX ORDER — DOCUSATE SODIUM 100 MG/1
100 CAPSULE, LIQUID FILLED ORAL 2 TIMES DAILY
Status: DISCONTINUED | OUTPATIENT
Start: 2022-09-05 | End: 2022-09-10 | Stop reason: HOSPADM

## 2022-09-05 RX ORDER — HYDROCODONE BITARTRATE AND ACETAMINOPHEN 5; 325 MG/1; MG/1
1 TABLET ORAL EVERY 4 HOURS PRN
Status: DISCONTINUED | OUTPATIENT
Start: 2022-09-05 | End: 2022-09-10 | Stop reason: HOSPADM

## 2022-09-05 RX ORDER — FOLIC ACID 1 MG/1
1 TABLET ORAL DAILY
Status: DISCONTINUED | OUTPATIENT
Start: 2022-09-05 | End: 2022-09-10 | Stop reason: HOSPADM

## 2022-09-05 RX ORDER — PHENYLEPHRINE HCL IN 0.9% NACL 1 MG/10 ML
SYRINGE (ML) INTRAVENOUS
Status: DISCONTINUED | OUTPATIENT
Start: 2022-09-05 | End: 2022-09-05

## 2022-09-05 RX ORDER — ONDANSETRON 2 MG/ML
4 INJECTION INTRAMUSCULAR; INTRAVENOUS EVERY 12 HOURS PRN
Status: DISCONTINUED | OUTPATIENT
Start: 2022-09-05 | End: 2022-09-10 | Stop reason: HOSPADM

## 2022-09-05 RX ORDER — PROPOFOL 10 MG/ML
VIAL (ML) INTRAVENOUS
Status: DISCONTINUED | OUTPATIENT
Start: 2022-09-05 | End: 2022-09-05

## 2022-09-05 RX ORDER — HYDROCODONE BITARTRATE AND ACETAMINOPHEN 500; 5 MG/1; MG/1
TABLET ORAL
Status: DISCONTINUED | OUTPATIENT
Start: 2022-09-05 | End: 2022-09-10 | Stop reason: HOSPADM

## 2022-09-05 RX ORDER — VASOPRESSIN 20 [USP'U]/ML
INJECTION, SOLUTION INTRAMUSCULAR; SUBCUTANEOUS
Status: DISCONTINUED | OUTPATIENT
Start: 2022-09-05 | End: 2022-09-05

## 2022-09-05 RX ORDER — CEFAZOLIN SODIUM 1 G/3ML
INJECTION, POWDER, FOR SOLUTION INTRAMUSCULAR; INTRAVENOUS
Status: DISCONTINUED | OUTPATIENT
Start: 2022-09-05 | End: 2022-09-05

## 2022-09-05 RX ORDER — CEFAZOLIN SODIUM 2 G/50ML
2 SOLUTION INTRAVENOUS
Status: DISPENSED | OUTPATIENT
Start: 2022-09-05 | End: 2022-09-06

## 2022-09-05 RX ORDER — DEXMEDETOMIDINE HYDROCHLORIDE 4 UG/ML
0-1.4 INJECTION, SOLUTION INTRAVENOUS CONTINUOUS
Status: DISCONTINUED | OUTPATIENT
Start: 2022-09-05 | End: 2022-09-06

## 2022-09-05 RX ORDER — MAGNESIUM SULFATE HEPTAHYDRATE 40 MG/ML
4 INJECTION, SOLUTION INTRAVENOUS
Status: DISCONTINUED | OUTPATIENT
Start: 2022-09-05 | End: 2022-09-10 | Stop reason: HOSPADM

## 2022-09-05 RX ORDER — HEPARIN SODIUM 1000 [USP'U]/ML
INJECTION, SOLUTION INTRAVENOUS; SUBCUTANEOUS
Status: DISCONTINUED | OUTPATIENT
Start: 2022-09-05 | End: 2022-09-05

## 2022-09-05 RX ORDER — MORPHINE SULFATE 10 MG/ML
2 INJECTION INTRAMUSCULAR; INTRAVENOUS; SUBCUTANEOUS
Status: DISCONTINUED | OUTPATIENT
Start: 2022-09-05 | End: 2022-09-06

## 2022-09-05 RX ORDER — MUPIROCIN 20 MG/G
OINTMENT TOPICAL 2 TIMES DAILY
Status: DISPENSED | OUTPATIENT
Start: 2022-09-05 | End: 2022-09-10

## 2022-09-05 RX ORDER — SODIUM CHLORIDE, SODIUM GLUCONATE, SODIUM ACETATE, POTASSIUM CHLORIDE AND MAGNESIUM CHLORIDE 30; 37; 368; 526; 502 MG/100ML; MG/100ML; MG/100ML; MG/100ML; MG/100ML
1000 INJECTION, SOLUTION INTRAVENOUS CONTINUOUS
Status: ACTIVE | OUTPATIENT
Start: 2022-09-05 | End: 2022-09-06

## 2022-09-05 RX ORDER — CLOPIDOGREL BISULFATE 75 MG/1
75 TABLET ORAL DAILY
Status: DISCONTINUED | OUTPATIENT
Start: 2022-09-06 | End: 2022-09-10 | Stop reason: HOSPADM

## 2022-09-05 RX ORDER — ROCURONIUM BROMIDE 10 MG/ML
INJECTION, SOLUTION INTRAVENOUS
Status: DISCONTINUED | OUTPATIENT
Start: 2022-09-05 | End: 2022-09-05

## 2022-09-05 RX ORDER — ALBUMIN HUMAN 50 G/1000ML
12.5 SOLUTION INTRAVENOUS
Status: DISCONTINUED | OUTPATIENT
Start: 2022-09-05 | End: 2022-09-10 | Stop reason: HOSPADM

## 2022-09-05 RX ORDER — LOPERAMIDE HYDROCHLORIDE 2 MG/1
4 CAPSULE ORAL ONCE
Status: DISCONTINUED | OUTPATIENT
Start: 2022-09-05 | End: 2022-09-10 | Stop reason: HOSPADM

## 2022-09-05 RX ORDER — LIDOCAINE HYDROCHLORIDE 20 MG/ML
INJECTION INTRAVENOUS
Status: DISCONTINUED | OUTPATIENT
Start: 2022-09-05 | End: 2022-09-05

## 2022-09-05 RX ORDER — TRANEXAMIC ACID 100 MG/ML
INJECTION, SOLUTION INTRAVENOUS
Status: DISCONTINUED | OUTPATIENT
Start: 2022-09-05 | End: 2022-09-05

## 2022-09-05 RX ORDER — LACTULOSE 10 G/15ML
20 SOLUTION ORAL EVERY 6 HOURS PRN
Status: DISCONTINUED | OUTPATIENT
Start: 2022-09-05 | End: 2022-09-10 | Stop reason: HOSPADM

## 2022-09-05 RX ORDER — NITROGLYCERIN 5 MG/ML
INJECTION, SOLUTION INTRAVENOUS
Status: DISCONTINUED | OUTPATIENT
Start: 2022-09-05 | End: 2022-09-05

## 2022-09-05 RX ORDER — CALCIUM GLUCONATE 20 MG/ML
1 INJECTION, SOLUTION INTRAVENOUS
Status: DISCONTINUED | OUTPATIENT
Start: 2022-09-05 | End: 2022-09-06

## 2022-09-05 RX ORDER — ALBUMIN HUMAN 50 G/1000ML
SOLUTION INTRAVENOUS CONTINUOUS PRN
Status: DISCONTINUED | OUTPATIENT
Start: 2022-09-05 | End: 2022-09-05

## 2022-09-05 RX ORDER — PAPAVERINE HYDROCHLORIDE 30 MG/ML
INJECTION INTRAMUSCULAR; INTRAVENOUS
Status: DISCONTINUED | OUTPATIENT
Start: 2022-09-05 | End: 2022-09-05 | Stop reason: HOSPADM

## 2022-09-05 RX ORDER — SUCCINYLCHOLINE CHLORIDE 20 MG/ML
INJECTION INTRAMUSCULAR; INTRAVENOUS
Status: DISCONTINUED | OUTPATIENT
Start: 2022-09-05 | End: 2022-09-05

## 2022-09-05 RX ORDER — HEPARIN SODIUM 5000 [USP'U]/ML
INJECTION, SOLUTION INTRAVENOUS; SUBCUTANEOUS
Status: DISCONTINUED | OUTPATIENT
Start: 2022-09-05 | End: 2022-09-05 | Stop reason: HOSPADM

## 2022-09-05 RX ADMIN — Medication 50 MCG: at 08:09

## 2022-09-05 RX ADMIN — Medication 100 MCG: at 10:09

## 2022-09-05 RX ADMIN — OXYCODONE 5 MG: 5 TABLET ORAL at 09:09

## 2022-09-05 RX ADMIN — VASOPRESSIN 1 UNITS: 20 INJECTION INTRAVENOUS at 10:09

## 2022-09-05 RX ADMIN — ROCURONIUM BROMIDE 25 MG: 10 SOLUTION INTRAVENOUS at 09:09

## 2022-09-05 RX ADMIN — ALBUMIN (HUMAN) 12.5 G: 12.5 SOLUTION INTRAVENOUS at 12:09

## 2022-09-05 RX ADMIN — MIDAZOLAM HYDROCHLORIDE 2.5 MG: 5 INJECTION, SOLUTION INTRAMUSCULAR; INTRAVENOUS at 10:09

## 2022-09-05 RX ADMIN — SODIUM BICARBONATE 50 MEQ: 84 INJECTION, SOLUTION INTRAVENOUS at 11:09

## 2022-09-05 RX ADMIN — FAMOTIDINE 20 MG: 20 TABLET, FILM COATED ORAL at 09:09

## 2022-09-05 RX ADMIN — MUPIROCIN: 20 OINTMENT TOPICAL at 09:09

## 2022-09-05 RX ADMIN — ONDANSETRON 4 MG: 2 INJECTION INTRAMUSCULAR; INTRAVENOUS at 09:09

## 2022-09-05 RX ADMIN — PROTAMINE SULFATE 400 MG: 10 INJECTION, SOLUTION INTRAVENOUS at 10:09

## 2022-09-05 RX ADMIN — PROPOFOL 50 MG: 10 INJECTION, EMULSION INTRAVENOUS at 07:09

## 2022-09-05 RX ADMIN — Medication 50 MCG: at 09:09

## 2022-09-05 RX ADMIN — ALBUMIN (HUMAN) 12.5 G: 12.5 SOLUTION INTRAVENOUS at 03:09

## 2022-09-05 RX ADMIN — SODIUM CHLORIDE 1 UNITS/HR: 9 INJECTION, SOLUTION INTRAVENOUS at 09:09

## 2022-09-05 RX ADMIN — SODIUM CHLORIDE, SODIUM GLUCONATE, SODIUM ACETATE, POTASSIUM CHLORIDE AND MAGNESIUM CHLORIDE 1000 ML: 526; 502; 368; 37; 30 INJECTION, SOLUTION INTRAVENOUS at 12:09

## 2022-09-05 RX ADMIN — CALCIUM CHLORIDE INJECTION 0.5 G: 100 INJECTION, SOLUTION INTRAVENOUS at 10:09

## 2022-09-05 RX ADMIN — METOPROLOL SUCCINATE 12.5 MG: 25 TABLET, EXTENDED RELEASE ORAL at 04:09

## 2022-09-05 RX ADMIN — TRANEXAMIC ACID 740 MG: 100 INJECTION, SOLUTION INTRAVENOUS at 08:09

## 2022-09-05 RX ADMIN — MIDAZOLAM HYDROCHLORIDE 2.5 MG: 5 INJECTION, SOLUTION INTRAMUSCULAR; INTRAVENOUS at 06:09

## 2022-09-05 RX ADMIN — ROCURONIUM BROMIDE 5 MG: 10 SOLUTION INTRAVENOUS at 07:09

## 2022-09-05 RX ADMIN — PROPOFOL 30 MG: 10 INJECTION, EMULSION INTRAVENOUS at 07:09

## 2022-09-05 RX ADMIN — NITROGLYCERIN 25 MCG: 5 INJECTION, SOLUTION INTRAVENOUS at 10:09

## 2022-09-05 RX ADMIN — CLEVIPIDINE 2 MG/HR: 0.5 EMULSION INTRAVENOUS at 02:09

## 2022-09-05 RX ADMIN — TRANEXAMIC ACID 740 MG: 100 INJECTION, SOLUTION INTRAVENOUS at 10:09

## 2022-09-05 RX ADMIN — MIDAZOLAM HYDROCHLORIDE 2.5 MG: 5 INJECTION, SOLUTION INTRAMUSCULAR; INTRAVENOUS at 07:09

## 2022-09-05 RX ADMIN — ALBUMIN HUMAN: 50 SOLUTION INTRAVENOUS at 10:09

## 2022-09-05 RX ADMIN — ROCURONIUM BROMIDE 25 MG: 10 SOLUTION INTRAVENOUS at 10:09

## 2022-09-05 RX ADMIN — PROTAMINE SULFATE 20 MG: 10 INJECTION, SOLUTION INTRAVENOUS at 10:09

## 2022-09-05 RX ADMIN — LIDOCAINE HYDROCHLORIDE 100 MG: 20 INJECTION INTRAVENOUS at 07:09

## 2022-09-05 RX ADMIN — ROCURONIUM BROMIDE 20 MG: 10 SOLUTION INTRAVENOUS at 10:09

## 2022-09-05 RX ADMIN — DEXMEDETOMIDINE HYDROCHLORIDE 0.5 MCG/KG/HR: 100 INJECTION, SOLUTION INTRAVENOUS at 09:09

## 2022-09-05 RX ADMIN — INSULIN HUMAN 2 UNITS/HR: 1 INJECTION, SOLUTION INTRAVENOUS at 12:09

## 2022-09-05 RX ADMIN — Medication 100 MCG: at 07:09

## 2022-09-05 RX ADMIN — MIDAZOLAM HYDROCHLORIDE 2.5 MG: 5 INJECTION, SOLUTION INTRAMUSCULAR; INTRAVENOUS at 09:09

## 2022-09-05 RX ADMIN — SODIUM CHLORIDE: 0.9 INJECTION, SOLUTION INTRAVENOUS at 10:09

## 2022-09-05 RX ADMIN — SODIUM CHLORIDE: 0.9 INJECTION, SOLUTION INTRAVENOUS at 07:09

## 2022-09-05 RX ADMIN — FENTANYL CITRATE 100 MCG: 50 INJECTION, SOLUTION INTRAMUSCULAR; INTRAVENOUS at 07:09

## 2022-09-05 RX ADMIN — MORPHINE SULFATE 2 MG: 10 INJECTION, SOLUTION INTRAMUSCULAR; INTRAVENOUS at 12:09

## 2022-09-05 RX ADMIN — MORPHINE SULFATE 2 MG: 10 INJECTION, SOLUTION INTRAMUSCULAR; INTRAVENOUS at 07:09

## 2022-09-05 RX ADMIN — PROPOFOL 20 MG: 10 INJECTION, EMULSION INTRAVENOUS at 08:09

## 2022-09-05 RX ADMIN — SUCCINYLCHOLINE CHLORIDE 140 MG: 20 INJECTION, SOLUTION INTRAMUSCULAR; INTRAVENOUS at 07:09

## 2022-09-05 RX ADMIN — FENTANYL CITRATE 150 MCG: 50 INJECTION, SOLUTION INTRAMUSCULAR; INTRAVENOUS at 08:09

## 2022-09-05 RX ADMIN — CALCIUM CHLORIDE INJECTION 1 G: 100 INJECTION, SOLUTION INTRAVENOUS at 11:09

## 2022-09-05 RX ADMIN — HYDROCODONE BITARTRATE AND ACETAMINOPHEN 1 TABLET: 5; 325 TABLET ORAL at 04:09

## 2022-09-05 RX ADMIN — DOCUSATE SODIUM 100 MG: 100 CAPSULE, LIQUID FILLED ORAL at 09:09

## 2022-09-05 RX ADMIN — ROCURONIUM BROMIDE 50 MG: 10 SOLUTION INTRAVENOUS at 08:09

## 2022-09-05 RX ADMIN — ETOMIDATE 20 MG: 2 INJECTION INTRAVENOUS at 07:09

## 2022-09-05 RX ADMIN — DEXTROSE AND SODIUM CHLORIDE: 5; 450 INJECTION, SOLUTION INTRAVENOUS at 12:09

## 2022-09-05 RX ADMIN — CEFAZOLIN 2 G: 330 INJECTION, POWDER, FOR SOLUTION INTRAMUSCULAR; INTRAVENOUS at 10:09

## 2022-09-05 RX ADMIN — CEFAZOLIN SODIUM 2 G: 2 SOLUTION INTRAVENOUS at 03:09

## 2022-09-05 RX ADMIN — ATORVASTATIN CALCIUM 40 MG: 40 TABLET, FILM COATED ORAL at 09:09

## 2022-09-05 RX ADMIN — FENTANYL CITRATE 250 MCG: 50 INJECTION, SOLUTION INTRAMUSCULAR; INTRAVENOUS at 07:09

## 2022-09-05 RX ADMIN — EPINEPHRINE 0.04 MCG/KG/MIN: 1 INJECTION INTRAMUSCULAR; INTRAVENOUS; SUBCUTANEOUS at 09:09

## 2022-09-05 RX ADMIN — HEPARIN SODIUM 35000 UNITS: 1000 INJECTION, SOLUTION INTRAVENOUS; SUBCUTANEOUS at 08:09

## 2022-09-05 RX ADMIN — ROCURONIUM BROMIDE 45 MG: 10 SOLUTION INTRAVENOUS at 07:09

## 2022-09-05 NOTE — ANESTHESIA PREPROCEDURE EVALUATION
09/05/2022  Gustavo Cheung is a 67 y.o., male.    Consult performed by: Adolfo Coleman MD  Consult ordered by: SHELBY Coker  Reason for consult: Second opinion for surgical revacularization in COVID + patient with chest pain syndrome currently asymptomatic  Assessment/Recommendations: This is a 67-year-old male who was admitted to an [facility] hospital with substernal chest discomfort.  A left heart catheterization was performed that showed a high-grade proximal lesion in the left anterior descending with moderately depressed LVF.   The right coronary has minimal luminal disease however at its bifurcation to the posterior descending and posterolateral, there is another high-grade stenosis.   The patient was tested for COVID and has come up positive.  He is presently stable and pain-free.       Usually with the patient being COVID positive, would defer surgery until he has recovered from his COVID syndrome if at all possible, as the risk of COVID related myocardial dysfunction is quite high.  Fortunately, the patient has minimal COVID symptoms.  Considering the degree of proximal LAD stenosis,  I would recommend surgery this admission.  Also recommend IV heparin drip in the interim.  Will discuss with cardiology.  Please repeat COVID assay Sunday as this will be 5 days from his last positive test.            Pre-op Assessment    I have reviewed the Patient Summary Reports.     I have reviewed the Nursing Notes.       Review of Systems  Cardiovascular:  Coronary Artery Disease: Hx of Myocardial Infarction        Physical Exam  General: Well nourished    Airway:  Mallampati: II / II  Mouth Opening: Normal  TM Distance: Normal  Tongue: Normal    Dental:  Periodontal disease    Chest/Lungs:  Clear to auscultation    Heart:  Rate: Normal        Anesthesia Plan  Type of Anesthesia, risks & benefits  discussed:    Anesthesia Type: Gen ETT  Intra-op Monitoring Plan: Standard ASA Monitors, Art Line and Central Line  Post Op Pain Control Plan: multimodal analgesia  Induction:  IV  Airway Plan: Direct and Video  Informed Consent: Informed consent signed with the Patient and all parties understand the risks and agree with anesthesia plan.  All questions answered. Patient consented to blood products? Yes  ASA Score: 4 Emergent  Anesthesia Plan Notes: Summary    ? The left ventricle is normal in size with concentric remodeling and mildly decreased systolic function.  ? The estimated ejection fraction is 42%.  ? Regional wall motion abnormalities of the LAD and RCA territories.  ? Grade I left ventricular diastolic dysfunction.  ? Normal right ventricular size with normal right ventricular systolic function.  ? The estimated PA systolic pressure is 14 mmHg.         Ready For Surgery From Anesthesia Perspective.     .

## 2022-09-05 NOTE — OP NOTE
[facility] - [facility] Tele[facility]  Cardiothoracic Surgery  Operative Note    SUMMARY     Date of Procedure: 9/5/2022     Procedure:  1.  Urgent coronary artery bypass grafting x2 with EVH (L GSV)  -left internal mammary artery to the LAD (severely diseased and heavily calcified)   -reverse saphenous vein graft to the PDA (very small and severely diseased)   * it should be noted that the diagonal system was severely diseased, heavily calcified, and ungraftable    Surgeon: LAMBERT Coleman     Assistant: Ross Gutierrez    Pre-Operative Diagnosis:  1.  Acute myocardial infarction  2.  Failed PCI  3. Ischemic cardiomyopathy   4.  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  Shortness of breath   6. Hypertension  7.  Diastolic dysfunction    8.  Dyslipidemia    Post-Operative Diagnosis:  Same    Anesthesia: General    Operative Findings (including complications, if any):  Severe, calcified coronary artery disease with brawny edema of the anterior wall    Description of Technical Procedures: Patient was delivered to the operating room, support lines were placed, general endotracheal anesthesia was induced.  Patient was prepped and draped in usual sterile fashion.  Saphenous vein was harvested from the lower extremity with the endoscopic saphenous vein harvesting technique.  Wounds were closed in layers with Vicryl.  Median sternotomy was made in the left internal mammary artery was harvested as a pedicle graft.  Heparin was administered, once the ACT was satisfactory the patient was cannulated via the ascending aorta and right atrial appendage and placed on cardiopulmonary bypass.  Aortic cross-clamp was applied and cardioplegia was given down the aortic root to promote diastolic arrest.  Topical cold saline was also used to promote hypothermia.  Distal target coronary arteries were incised in longitudinal fashion conduits were sewn in an end-to-side fashion using  Prolene suture.  Proximal ends of the saphenous vein lengths were sewn to the anterior ascending aorta in an end-to-side fashion using Prolene suture.  The patient was weaned and discontinued from cardiopulmonary bypass.  Right atrial pacing wires were placed as were 2-24 Sinhala Prosper drains, 1 in the left chest and 1 in the mediastinum.  Protamine was administered the patient was decannulated.  Sternum was closed with stainless steel wire in the [employer] sternal plating system.  Subcutaneous tissues were closed in layers with Vicryl.  Patient tolerated the procedure well will be delivered to the recovery room in stable condition.     Estimated Blood Loss (EBL): N/A          Specimens:   Specimen (24h ago, onward)      None                    Condition: Stable    Disposition: ICU - intubated and hemodynamically stable.

## 2022-09-05 NOTE — ANESTHESIA PROCEDURE NOTES
Central Line    Diagnosis: CABG  Patient location during procedure: done in OR  Timeout: 9/5/2022 7:46 AM  Procedure end time: 9/5/2022 7:54 AM    Staffing  Authorizing Provider: Dangelo Herzog MD  Performing Provider: Noe Koch    Staffing  Performed: other anesthesia staff   Anesthesiologist: Dangelo Herzog MD  Other anesthesia staff: Noe Koch  Anesthesiologist was present at the time of the procedure.  Preanesthetic Checklist  Completed: patient identified, IV checked, site marked, risks and benefits discussed, surgical consent, monitors and equipment checked, pre-op evaluation, timeout performed and anesthesia consent given  Indication   Indication: hemodynamic monitoring, med administration     Anesthesia   general anesthesia    Central Line   Skin Prep: skin prepped with ChloraPrep, skin prep agent completely dried prior to procedure  Sterile Barriers Followed: Yes    All five maximal barriers used- gloves, gown, cap, mask, and large sterile sheet    hand hygiene performed prior to central venous catheter insertion  Location: right internal jugular.   Catheter type: triple lumen  Catheter Size: 9 Fr  Ultrasound: vascular probe with ultrasound   Vessel Caliber: medium, patent, compressibility normal  Needle advanced into vessel with real time Ultrasound guidance.  Guidewire confirmed in vessel.  sterile gel and probe cover used in ultrasound-guided central venous catheter insertion   Manometry: Venous cannualation confirmed by visual estimation of blood vessel pressure using manometry.  Insertion Attempts: 1   Securement:line sutured, sterile dressing applied, blood return through all ports and chlorhexidine patch    Post-Procedure        Guidewire  Guidewire removed intact, verified with nurse.

## 2022-09-05 NOTE — OP NOTE
[facility] - [facility]  Cardiothoracic Surgery  Operative Note    SUMMARY     Date of Procedure: 9/5/2022     Procedure: Procedure(s) (LRB):  CORONARY ARTERY BYPASS GRAFT (CABG) (N/A) CABG x 2 LIMA- LAD, SV- PDA. EVH LLE    Surgeon(s) and Role:     * Adolfo Coleman MD - Primary    Assistant: Ross Gutierrez PA-C    Pre-Operative Diagnosis: Coronary artery disease, unspecified vessel or lesion type, unspecified whether angina present, unspecified whether native or transplanted heart [I25.10]    Post-Operative Diagnosis: Post-Op Diagnosis Codes:     * Coronary artery disease, unspecified vessel or lesion type, unspecified whether angina present, unspecified whether native or transplanted heart [I25.10]    Anesthesia: General    Operative Findings (including complications, if any):            Specimens:   Specimen (24h ago, onward)      None                    Condition: Good    Disposition: ICU - intubated and hemodynamically stable.

## 2022-09-05 NOTE — TRANSFER OF CARE
"Anesthesia Transfer of Care Note    Patient: Gustavo Cheung    Procedure(s) Performed: Procedure(s) (LRB):  CORONARY ARTERY BYPASS GRAFT (CABG) (N/A)    Patient location: ICU    Anesthesia Type: general    Transport from OR: Upon arrival to PACU/ICU, patient attached to ventilator and auscultated to confirm bilateral breath sounds and adequate TV. Transported from OR intubated on 100% O2 by AMBU with assisted ventilation. Continuous ECG monitoring in transport. Continuous SpO2 monitoring in transport. Continuos invasive BP monitoring in transport. Continuous CVP monitoring in transport    Post pain: adequate analgesia    Post assessment: no apparent anesthetic complications and tolerated procedure well    Post vital signs: stable    Level of consciousness: sedated    Nausea/Vomiting: no nausea/vomiting    Complications: none    Transfer of care protocol was followed      Last vitals:   Visit Vitals  /82   Pulse 70   Temp 36.6 °C (97.8 °F) (Oral)   Resp 16   Ht 5' 4" (1.626 m)   Wt 73.4 kg (161 lb 13.1 oz)   SpO2 99%   BMI 27.78 kg/m²     "

## 2022-09-05 NOTE — CONSULTS
[facility] - [facility]  Pulmonary Critical Care Note    Patient Name: Gustavo Cheung  MRN: [id number]  Admission Date: 2022  Hospital Length of Stay: 5 days  Code Status: Full Code  Attending Provider: PURA Saenz MD  Primary Care Provider: No primary care provider on file.     Subjective:     HPI:   Patient is a 67-year-old  male who presented to [place] emergency department on  with complaints of chest pain.  On admission, patient noted to be COVID positive and have elevated troponin.  EKG showed T-wave inversion in lateral leads.  Found to have multivessel CAD, CT surgery was consulted and patient had CABG done on .  Patient had left internal mammary artery to the LAD, and reverse saphenous vein graft to the PDA.  Patient tolerated the procedure well was transferred to the ICU for postoperative care.  Patient remains intubated and sedated at this time    History reviewed. No pertinent past medical history.    Past Surgical History:   Procedure Laterality Date    HERNIA REPAIR      LEFT HEART CATHETERIZATION Left 2022    Procedure: Left heart cath;  Surgeon: Luis Daniel Maza MD;  Location: [facility] CATH LAB;  Service: Cardiology;  Laterality: Left;    PERCUTANEOUS TRANSLUMINAL BALLOON ANGIOPLASTY OF CORONARY ARTERY  2022    Procedure: Angioplasty-coronary;  Surgeon: Luis Daniel Maza MD;  Location: [facility] CATH LAB;  Service: Cardiology;;     Social History     Socioeconomic History    Marital status:    Tobacco Use    Smoking status: Former     Types: Cigarettes     Quit date: 2016     Years since quittin.0    Smokeless tobacco: Never   Substance and Sexual Activity    Alcohol use: Not Currently    Drug use: Never    Sexual activity: Not Currently     Birth control/protection: Abstinence     Current Outpatient Medications   Medication Instructions    cardioplegic solution no.16, DEL NIDO, (CARDIOPLEGIA DEL NIDO FORMULA) 26 mEq/1,052.8 mL (potassium)  Soln 1,052.8 mLs, Other, Once     Current Inpatient Medications   aspirin  81 mg Oral Daily    atorvastatin  40 mg Oral QHS    ceFAZolin (ANCEF) IVPB  2 g Intravenous Q8H    [START ON 9/6/2022] clopidogreL  75 mg Oral Daily    docusate sodium  100 mg Oral BID    famotidine  20 mg Oral BID    folic acid  1 mg Oral Daily    loperamide  4 mg Oral Once    [START ON 9/6/2022] metoprolol tartrate  12.5 mg Oral BID    mupirocin   Nasal BID    [START ON 9/6/2022] sucralfate  1 g Oral QID (AC & HS)     Current Intravenous Infusions   dexmedetomidine (PRECEDEX) infusion      dextrose 5 % and 0.45 % NaCl 100 mL/hr at 09/05/22 1221    electrolyte-A      insulin regular 1 units/mL infusion orderable (CTS POST-OP) 2 Units/hr (09/05/22 1200)     Review of Systems   Unable to perform ROS: Intubated      Objective:       Intake/Output Summary (Last 24 hours) at 9/5/2022 1228  Last data filed at 9/5/2022 1200  Gross per 24 hour   Intake 2171 ml   Output 1565 ml   Net 606 ml     Vital Signs (Most Recent):  Temp: (!) 95.9 °F (35.5 °C) (09/05/22 1200)  Pulse: 82 (09/05/22 1200)  Resp: 20 (09/05/22 1200)  BP: 97/65 (09/05/22 1200)  SpO2: 96 % (09/05/22 1200)    Body mass index is 27.78 kg/m².  Weight: 73.4 kg (161 lb 13.1 oz) Vital Signs (24h Range):  Temp:  [95.9 °F (35.5 °C)-98.6 °F (37 °C)] 95.9 °F (35.5 °C)  Pulse:  [62-86] 82  Resp:  [14-20] 20  SpO2:  [94 %-100 %] 96 %  BP: ()/(49-82) 97/65  Arterial Line BP: ()/(45-56) 112/49     Physical Exam  Constitutional:       Appearance: Normal appearance.   HENT:      Head: Normocephalic and atraumatic.   Cardiovascular:      Rate and Rhythm: Normal rate and regular rhythm.      Pulses: Normal pulses.      Heart sounds: Normal heart sounds.      Comments: Chest tube in place, surgical site covered with dressing, no erythema or other abnormality   Pulmonary:      Effort: Pulmonary effort is normal.      Breath sounds: Normal breath sounds.   Abdominal:      General: Abdomen is  flat.      Palpations: Abdomen is soft.   Genitourinary:     Comments: Wilburn cath in place, good urine output  Skin:     Capillary Refill: Capillary refill takes less than 2 seconds.   Neurological:      Mental Status: He is alert.      Comments: Not on sedation, pupils reactive. Opens eyes to name, follows commands     Lines/Drains/Airways       Central Venous Catheter Line  Duration             Percutaneous Central Line Insertion/Assessment - Triple Lumen  09/05/22 0756 right internal jugular <1 day              Drain  Duration                  Chest Tube 09/05/22 1008 Left Mediastinal 24 Fr. <1 day         Chest Tube 09/05/22 1008 Right Mediastinal 24 Fr. <1 day         NG/OG Tube 09/05/22 0745 Right mouth <1 day         Urethral Catheter 09/05/22 0738 Temperature probe 16 Fr. <1 day              Airway  Duration                  Airway - Non-Surgical 09/05/22 0734 <1 day              Arterial Line  Duration             Arterial Line 09/05/22 0649 Right Radial <1 day              Peripheral Intravenous Line  Duration                  Peripheral IV - Single Lumen 09/03/22 0230 20 G Left;Posterior Wrist 2 days                Significant Labs:    Lab Results   Component Value Date    WBC 13.8 (H) 09/05/2022    HGB 9.7 (L) 09/05/2022    HCT 31.3 (L) 09/05/2022    MCV 94.8 (H) 09/05/2022     09/05/2022     BMP  Lab Results   Component Value Date     09/05/2022    K 4.0 09/05/2022    CO2 19 (L) 09/05/2022    BUN 11.1 09/05/2022    CREATININE 0.85 09/05/2022    CALCIUM 10.1 (H) 09/05/2022   ABG  Recent Labs   Lab 09/05/22  1155   PH 7.36   PO2 84   PCO2 37   HCO3 20.9       Mechanical Ventilation Support:  Vent Mode: SIMV (PRVC) + PS (09/05/22 1120)  Ventilator Initiated: Yes (09/05/22 1120)  Set Rate: 20 BPM (09/05/22 1120)  Vt Set: 500 mL (09/05/22 1120)  Pressure Support: 10 cmH20 (09/05/22 1120)  PEEP/CPAP: 5 cmH20 (09/05/22 1120)  Oxygen Concentration (%): 60 (09/05/22 1122)  Peak Airway Pressure: 17  cmH2O (09/05/22 1120)  Total Ve: 12 mL (09/05/22 1120)  F/VT Ratio<105 (RSBI): (!) 24.01 (09/05/22 1120)    Significant Imaging:  I have reviewed the pertinent imaging within the past 24 hours.  Assessment/Plan:     Assessment  1.)Multivessel CAD s/p CABG on 09/05 (left internal mammary artery to the LAD, and reverse saphenous vein graft to the PDA. )  2.)COVID positive  3.)Normocytic Anemia    Plan  -CT Surgery following  -continue CABG protocol  - not on any sedation, pain control as needed  -extubate as tolerated, currently on 40% FIO2 saturating 99%  - Asymptomatic from Covid perspective, will continue to monitor respiratory status  - continue home medications once tolerating oral intake  -will closely follow from a critical care aspect       Scooby Coy,   Pulmonary Critical Care Medicine  [facility] - [address] [place] [address]

## 2022-09-05 NOTE — PLAN OF CARE
Problem: Adult Inpatient Plan of Care  Goal: Plan of Care Review  Outcome: Ongoing, Progressing  Goal: Patient-Specific Goal (Individualized)  Outcome: Ongoing, Progressing  Goal: Absence of Hospital-Acquired Illness or Injury  Outcome: Ongoing, Progressing  Goal: Optimal Comfort and Wellbeing  Outcome: Ongoing, Progressing  Goal: Readiness for Transition of Care  Outcome: Ongoing, Progressing     Problem: Infection  Goal: Absence of Infection Signs and Symptoms  Outcome: Ongoing, Progressing     Problem: Fall Injury Risk  Goal: Absence of Fall and Fall-Related Injury  Outcome: Ongoing, Progressing     Problem: Skin Injury Risk Increased  Goal: Skin Health and Integrity  Outcome: Ongoing, Progressing     Problem: Communication Impairment (Mechanical Ventilation, Invasive)  Goal: Effective Communication  Outcome: Ongoing, Progressing     Problem: Device-Related Complication Risk (Mechanical Ventilation, Invasive)  Goal: Optimal Device Function  Outcome: Ongoing, Progressing     Problem: Nutrition Impairment (Mechanical Ventilation, Invasive)  Goal: Optimal Nutrition Delivery  Outcome: Ongoing, Progressing     Problem: Skin and Tissue Injury (Mechanical Ventilation, Invasive)  Goal: Absence of Device-Related Skin and Tissue Injury  Outcome: Ongoing, Progressing     Problem: Ventilator-Induced Lung Injury (Mechanical Ventilation, Invasive)  Goal: Absence of Ventilator-Induced Lung Injury  Outcome: Ongoing, Progressing     Problem: Communication Impairment (Artificial Airway)  Goal: Effective Communication  Outcome: Ongoing, Progressing     Problem: Device-Related Complication Risk (Artificial Airway)  Goal: Optimal Device Function  Outcome: Ongoing, Progressing     Problem: Skin and Tissue Injury (Artificial Airway)  Goal: Absence of Device-Related Skin or Tissue Injury  Outcome: Ongoing, Progressing     Problem: Noninvasive Ventilation Acute  Goal: Effective Unassisted Ventilation and Oxygenation  Outcome: Ongoing,  Progressing

## 2022-09-05 NOTE — INTERVAL H&P NOTE
The patient has been examined and the H&P has been reviewed:    I concur with the findings and no changes have occurred since H&P was written.    Surgery risks, benefits and alternative options discussed and understood by patient/family.          Active Hospital Problems    Diagnosis  POA    *Type 1 non-ST elevation myocardial infarction (NSTEMI) [I21.4]  Yes    Stable angina pectoris [I20.8]  Unknown      Resolved Hospital Problems   No resolved problems to display.

## 2022-09-05 NOTE — ANESTHESIA PROCEDURE NOTES
VARUN    Diagnosis: coronary artery disease  Patient location during procedure: OR  Procedure start time: 9/5/2022 9:45 AM  Timeout: 9/5/2022 9:45 AM  Procedure end time: 9/5/2022 9:55 AM  Surgery related to: coronary artery disease  Exam type: Baseline    Staffing  Performed: anesthesiologist     Anesthesiologist: Austin Oro DO        Anesthesiologist Present  Yes      Setup & Induction  Patient preparation: bite block inserted  Probe Insertion: easy  Exam: limitedDoppler Echo: 2D and color flow mapping.  Exam     Left Heart  Left Atruim: normal and 3.41    Left Ventricle: cm, normal (men 4.2-5.9; women 3.8-5.2)    LVAD  Estimated Ejection Fraction: 45-54% mild  Regional Wall Abnormalities: no RWMA            Right Heart    Intra Atrial Septum  PFO: no shunt by color flow doppler  no IAS aneurysm  no lipomatous hypertrophy  no Atrial Septal Defect (Asd)    Right Ventricle    Aortic Valve:  Stenosis: none.  Morphology: trileaflet    Regurgitation: no aortic valve regurgitation      Mitral Valve:   Morphology:normal  Prolapse: none  Flail: no flail  Jet Description: mild and centrally-directedStenosis: no significant stenosis.    Tricuspid Valve:  Regurgitation: none        Great Vessels  Ascending Aorta Diameter: 3.2 cm   Ascending Aorta Atherosclerosis: 1=nl-min dz  Aortic Arch Atherosclerosis: 2=mild dz (<3mm)  Descending Aorta Atherosclerosis: 2=mild dz (<3mm) (1.39mm)      Effusions none    SummaryFindings discussed with surgeon.    Other Findings   Off pump EF >60%, low End SV, giving albumin and pump blood.

## 2022-09-05 NOTE — ANESTHESIA PROCEDURE NOTES
Intubation    Date/Time: 9/5/2022 7:34 AM  Performed by: Brandy Miller CRNA  Authorized by: Dangelo Herzog MD     Intubation:     Induction:  Rapid sequence induction    Intubated:  Postinduction    Mask Ventilation:  Easy mask    Attempts:  1    Attempted By:  Student    Method of Intubation:  Direct    Blade:  Brian 3    Laryngeal View Grade: Grade IIA - cords partially seen      Difficult Airway Encountered?: No      Complications:  None    Airway Device:  Oral endotracheal tube    Airway Device Size:  8.0    Style/Cuff Inflation:  Cuffed (inflated to minimal occlusive pressure)    Tube secured:  22    Secured at:  The lips    Placement Verified By:  Capnometry    Complicating Factors:  None    Findings Post-Intubation:  BS equal bilateral and atraumatic/condition of teeth unchanged

## 2022-09-05 NOTE — ANESTHESIA PROCEDURE NOTES
Arterial    Diagnosis: CABG    Patient location during procedure: holding area  Procedure start time: 9/5/2022 6:49 AM  Timeout: 9/5/2022 6:48 AM  Procedure end time: 9/5/2022 6:52 AM    Staffing  Authorizing Provider: Brandy Miller CRNA  Performing Provider: Noe Koch      Preanesthetic Checklist  Completed: patient identified, IV checked, site marked, risks and benefits discussed, surgical consent, monitors and equipment checked, pre-op evaluation, timeout performed and anesthesia consent givenArterial  Skin Prep: chlorhexidine gluconate  Local Infiltration: none  Orientation: right  Location: radial    Catheter Size: 20 G  Catheter placement by Anatomical landmarks. Heme positive aspiration all ports. Insertion Attempts: 1  Assessment  Dressing: secured with tape and tegaderm  Patient: Tolerated well

## 2022-09-06 LAB
ANION GAP SERPL CALC-SCNC: 7 MEQ/L
BACTERIA SPEC CULT: NORMAL
BASOPHILS # BLD AUTO: 0.02 X10(3)/MCL (ref 0–0.2)
BASOPHILS NFR BLD AUTO: 0.2 %
BSA FOR ECHO PROCEDURE: 1.75 M2
BUN SERPL-MCNC: 9 MG/DL (ref 8.4–25.7)
CALCIUM SERPL-MCNC: 8.9 MG/DL (ref 8.8–10)
CHLORIDE SERPL-SCNC: 107 MMOL/L (ref 98–107)
CO2 SERPL-SCNC: 24 MMOL/L (ref 23–31)
CREAT SERPL-MCNC: 0.89 MG/DL (ref 0.73–1.18)
CREAT/UREA NIT SERPL: 10
EOSINOPHIL # BLD AUTO: 0 X10(3)/MCL (ref 0–0.9)
EOSINOPHIL NFR BLD AUTO: 0 %
ERYTHROCYTE [DISTWIDTH] IN BLOOD BY AUTOMATED COUNT: 13.9 % (ref 11.5–17)
GFR SERPLBLD CREATININE-BSD FMLA CKD-EPI: >60 MLS/MIN/1.73/M2
GLUCOSE SERPL-MCNC: 122 MG/DL (ref 82–115)
GROUP & RH: NORMAL
HCT VFR BLD AUTO: 32 % (ref 42–52)
HGB BLD-MCNC: 10.4 GM/DL (ref 14–18)
IMM GRANULOCYTES # BLD AUTO: 0.07 X10(3)/MCL (ref 0–0.04)
IMM GRANULOCYTES NFR BLD AUTO: 0.6 %
INDIRECT COOMBS GEL: NORMAL
LYMPHOCYTES # BLD AUTO: 0.81 X10(3)/MCL (ref 0.6–4.6)
LYMPHOCYTES NFR BLD AUTO: 6.7 %
MCH RBC QN AUTO: 30 PG (ref 27–31)
MCHC RBC AUTO-ENTMCNC: 32.5 MG/DL (ref 33–36)
MCV RBC AUTO: 92.2 FL (ref 80–94)
MONOCYTES # BLD AUTO: 1.36 X10(3)/MCL (ref 0.1–1.3)
MONOCYTES NFR BLD AUTO: 11.3 %
NEUTROPHILS # BLD AUTO: 9.8 X10(3)/MCL (ref 2.1–9.2)
NEUTROPHILS NFR BLD AUTO: 81.2 %
NRBC BLD AUTO-RTO: 0 %
PLATELET # BLD AUTO: 251 X10(3)/MCL (ref 130–400)
PMV BLD AUTO: 11 FL (ref 7.4–10.4)
POCT GLUCOSE: 107 MG/DL (ref 70–110)
POCT GLUCOSE: 108 MG/DL (ref 70–110)
POCT GLUCOSE: 109 MG/DL (ref 70–110)
POCT GLUCOSE: 109 MG/DL (ref 70–110)
POCT GLUCOSE: 117 MG/DL (ref 70–110)
POCT GLUCOSE: 118 MG/DL (ref 70–110)
POCT GLUCOSE: 119 MG/DL (ref 70–110)
POCT GLUCOSE: 121 MG/DL (ref 70–110)
POCT GLUCOSE: 83 MG/DL (ref 70–110)
POTASSIUM SERPL-SCNC: 4.5 MMOL/L (ref 3.5–5.1)
RBC # BLD AUTO: 3.47 X10(6)/MCL (ref 4.7–6.1)
SODIUM SERPL-SCNC: 138 MMOL/L (ref 136–145)
WBC # SPEC AUTO: 12 X10(3)/MCL (ref 4.5–11.5)

## 2022-09-06 PROCEDURE — 36415 COLL VENOUS BLD VENIPUNCTURE: CPT | Performed by: PHYSICIAN ASSISTANT

## 2022-09-06 PROCEDURE — 25000003 PHARM REV CODE 250: Performed by: INTERNAL MEDICINE

## 2022-09-06 PROCEDURE — 97110 THERAPEUTIC EXERCISES: CPT

## 2022-09-06 PROCEDURE — 99024 PR POST-OP FOLLOW-UP VISIT: ICD-10-PCS | Mod: POP,,, | Performed by: PHYSICIAN ASSISTANT

## 2022-09-06 PROCEDURE — 94760 N-INVAS EAR/PLS OXIMETRY 1: CPT

## 2022-09-06 PROCEDURE — 27000207 HC ISOLATION

## 2022-09-06 PROCEDURE — 63600175 PHARM REV CODE 636 W HCPCS: Performed by: PHYSICIAN ASSISTANT

## 2022-09-06 PROCEDURE — 94761 N-INVAS EAR/PLS OXIMETRY MLT: CPT

## 2022-09-06 PROCEDURE — 27000221 HC OXYGEN, UP TO 24 HOURS

## 2022-09-06 PROCEDURE — 99024 POSTOP FOLLOW-UP VISIT: CPT | Mod: POP,,, | Performed by: PHYSICIAN ASSISTANT

## 2022-09-06 PROCEDURE — 25000003 PHARM REV CODE 250: Performed by: PHYSICIAN ASSISTANT

## 2022-09-06 PROCEDURE — 85025 COMPLETE CBC W/AUTO DIFF WBC: CPT | Performed by: PHYSICIAN ASSISTANT

## 2022-09-06 PROCEDURE — 20000000 HC ICU ROOM

## 2022-09-06 PROCEDURE — 94799 UNLISTED PULMONARY SVC/PX: CPT

## 2022-09-06 PROCEDURE — 63600175 PHARM REV CODE 636 W HCPCS: Performed by: THORACIC SURGERY (CARDIOTHORACIC VASCULAR SURGERY)

## 2022-09-06 PROCEDURE — 80048 BASIC METABOLIC PNL TOTAL CA: CPT | Performed by: PHYSICIAN ASSISTANT

## 2022-09-06 RX ORDER — ENOXAPARIN SODIUM 100 MG/ML
40 INJECTION SUBCUTANEOUS EVERY 24 HOURS
Status: DISCONTINUED | OUTPATIENT
Start: 2022-09-06 | End: 2022-09-10 | Stop reason: HOSPADM

## 2022-09-06 RX ORDER — MUPIROCIN 20 MG/G
OINTMENT TOPICAL 2 TIMES DAILY
Status: CANCELLED | OUTPATIENT
Start: 2022-09-06 | End: 2022-09-11

## 2022-09-06 RX ADMIN — FAMOTIDINE 20 MG: 20 TABLET, FILM COATED ORAL at 08:09

## 2022-09-06 RX ADMIN — ENOXAPARIN SODIUM 40 MG: 40 INJECTION SUBCUTANEOUS at 06:09

## 2022-09-06 RX ADMIN — DOCUSATE SODIUM 100 MG: 100 CAPSULE, LIQUID FILLED ORAL at 08:09

## 2022-09-06 RX ADMIN — HYDROCODONE BITARTRATE AND ACETAMINOPHEN 1 TABLET: 5; 325 TABLET ORAL at 09:09

## 2022-09-06 RX ADMIN — Medication 81 MG: at 07:09

## 2022-09-06 RX ADMIN — FOLIC ACID 1 MG: 1 TABLET ORAL at 08:09

## 2022-09-06 RX ADMIN — HYDROCODONE BITARTRATE AND ACETAMINOPHEN 1 TABLET: 5; 325 TABLET ORAL at 05:09

## 2022-09-06 RX ADMIN — METOPROLOL TARTRATE 12.5 MG: 25 TABLET, FILM COATED ORAL at 08:09

## 2022-09-06 RX ADMIN — SUCRALFATE 1 G: 1 TABLET ORAL at 06:09

## 2022-09-06 RX ADMIN — SUCRALFATE 1 G: 1 TABLET ORAL at 08:09

## 2022-09-06 RX ADMIN — CLOPIDOGREL 75 MG: 75 TABLET, FILM COATED ORAL at 08:09

## 2022-09-06 RX ADMIN — MUPIROCIN: 20 OINTMENT TOPICAL at 08:09

## 2022-09-06 RX ADMIN — ATORVASTATIN CALCIUM 40 MG: 40 TABLET, FILM COATED ORAL at 08:09

## 2022-09-06 RX ADMIN — HYDROCODONE BITARTRATE AND ACETAMINOPHEN 1 TABLET: 5; 325 TABLET ORAL at 01:09

## 2022-09-06 RX ADMIN — OXYCODONE 5 MG: 5 TABLET ORAL at 09:09

## 2022-09-06 RX ADMIN — OXYCODONE 5 MG: 5 TABLET ORAL at 05:09

## 2022-09-06 RX ADMIN — CEFAZOLIN SODIUM 2 G: 2 SOLUTION INTRAVENOUS at 12:09

## 2022-09-06 NOTE — ANESTHESIA POSTPROCEDURE EVALUATION
Anesthesia Post Evaluation    Patient: Gustavo Cheung    Procedure(s) Performed: Procedure(s) (LRB):  CORONARY ARTERY BYPASS GRAFT (CABG) (N/A)    Final Anesthesia Type: general      Patient location during evaluation: ICU  Patient participation: No - Unable to Participate, Sedation  Level of consciousness: obtunded/minimal responses  Post-procedure vital signs: reviewed and stable  Pain management: adequate  Airway patency: patent    PONV status at discharge: No PONV  Anesthetic complications: no      Cardiovascular status: blood pressure returned to baseline  Respiratory status: intubated and ventilator  Hydration status: euvolemic  Follow-up needed           Vitals Value Taken Time   /77 09/06/22 0802   Temp 36.9 °C (98.4 °F) 09/06/22 0400   Pulse 83 09/06/22 0820   Resp 19 09/06/22 0820   SpO2 93 % 09/06/22 0820   Vitals shown include unvalidated device data.      No case tracking events are documented in the log.      Pain/Ata Score: Pain Rating Prior to Med Admin: 5 (9/6/2022  5:25 AM)  Pain Rating Post Med Admin: 2 (9/5/2022 10:15 PM)

## 2022-09-06 NOTE — PROGRESS NOTES
CT SURGERY PROGRESS NOTE  Gustavo Cheung  67 y.o.  1955    Patients Procedure: Procedure(s) (LRB):  CORONARY ARTERY BYPASS GRAFT (CABG) (N/A)    Subjective  Interval History: POD 1    ROS    Medication List  Infusions   clevidipine Stopped (09/06/22 0100)    dexmedetomidine (PRECEDEX) infusion      dextrose 5 % and 0.45 % NaCl 100 mL/hr at 09/05/22 1221    electrolyte-A 50 mL/hr at 09/05/22 1233    EPINEPHrine 0.01 mcg/kg/min (09/05/22 1800)    insulin regular 1 units/mL infusion orderable (CTS POST-OP) Stopped (09/05/22 2000)     Scheduled   aspirin  81 mg Oral Daily    atorvastatin  40 mg Oral QHS    ceFAZolin (ANCEF) IVPB  2 g Intravenous Q8H    clopidogreL  75 mg Oral Daily    docusate sodium  100 mg Oral BID    famotidine  20 mg Oral BID    folic acid  1 mg Oral Daily    loperamide  4 mg Oral Once    metoprolol tartrate  12.5 mg Oral BID    mupirocin   Nasal BID    sucralfate  1 g Oral QID (AC & HS)       Objective:  Recent Vitals:  Temp:  [95.9 °F (35.5 °C)-99.7 °F (37.6 °C)] 98.4 °F (36.9 °C)  Pulse:  [] 84  Resp:  [14-30] 20  SpO2:  [90 %-100 %] 97 %  BP: ()/(49-87) 112/78  Arterial Line BP: ()/(45-73) 157/66    Physical Exam     I/O last 24 hrs:  Intake/Output - Last 3 Shifts         09/04 0700  09/05 0659 09/05 0700 09/06 0659 09/06 0700  09/07 0659    P.O.       I.V. (mL/kg)  3840 (52.3)     Blood  421     IV Piggyback  1657     Total Intake(mL/kg)  5918 (80.6)     Urine (mL/kg/hr) 925 (0.5) 4480 (2.5)     Drains  0     Stool 0 0     Chest Tube  300     Total Output 925 4780     Net -925 +1138            Stool Occurrence 0 x 0 x             Labs  BMP:   Recent Labs   Lab 09/05/22  0350 09/05/22  1025 09/06/22  0123      < > 138   K 4.3   < > 4.5   CO2 22*   < > 24   BUN 13.9   < > 9.0   CREATININE 0.81   < > 0.89   CALCIUM 9.8   < > 8.9   MG 2.30  --   --     < > = values in this interval not displayed.     CBC:   Recent Labs   Lab 09/06/22  0123   WBC 12.0*   RBC 3.47*    HGB 10.4*   HCT 32.0*      MCV 92.2   MCH 30.0   MCHC 32.5*     CMP:   Recent Labs   Lab 09/05/22  0350 09/05/22  1025 09/06/22  0123   CALCIUM 9.8   < > 8.9   ALBUMIN 3.9  --   --       < > 138   K 4.3   < > 4.5   CO2 22*   < > 24   BUN 13.9   < > 9.0   CREATININE 0.81   < > 0.89   ALKPHOS 55  --   --    ALT 35  --   --    AST 17  --   --    BILITOT 0.3  --   --     < > = values in this interval not displayed.         Imaging:   CXR: X-Ray Chest AP Portable    Result Date: 9/6/2022  Minimal left pleural effusion. Electronically signed by: Jason Tsai Date:    09/06/2022 Time:    06:35    X-Ray Chest AP Portable    Result Date: 9/5/2022  Minimal left lower lobe atelectasis with support lines and tubes as above. Electronically signed by: Ross Majano MD Date:    09/05/2022 Time:    12:12         ASSESSMENT/PLAN:    Doing well  Transfer telem   Resume meds    Case and plan of care discussed with MD Ross Gutierrez PAMARNI

## 2022-09-06 NOTE — PROGRESS NOTES
[facility]   Cardiology  Progress Note    Patient Name: Gustavo Cheung  MRN: [id number]  Admission Date: 8/30/2022  Hospital Length of Stay: 6 days  Code Status: Full Code   Attending Physician: Bahman Riojas MD   Primary Care Physician: No primary care provider on file.  Expected Discharge Date:   Principal Problem:Type 1 non-ST elevation myocardial infarction (NSTEMI)    Subjective:     Brief HPI:  Mr. Cheung is a 66 year old male who is unknown to [facility]. He presented to [facility] ED on 8.30.22 with complaints of chest pain that started yesterday. He describes the pain as pressure-like substernal, nonradiating with an association of diaphoresis. He reports that the pain lasted for about an hour and resolved with nitro SL provided by EMS. Upon arrival to the ED, his initial troponin was 1.078 and AST//104. He was also noted to be COVID positive. His UDS was positive for cannabis. His EKG demonstrated SR w/ T-wave inversion in lateral leads. He denies current CP, SOB, or palps. [facility] has been consulted to further evaluate the patient's CP and elevated troponin.     9.2.22: NAD. Denies CP, SOB, or palps. Heparin drip per protocol.   9.3.22: NAD. Denies CP, SOB, or palps. Continues on heparin drip per protocol.   9.4.22: NAD. Denies CP, SOB, or palps. Heparin drip continues. Potential plans for CABG tomorrow.  9.6.22: NAD. Denies SOB or palps. + incisional CP. VSS. Sitting up in chair.      Hospital Course:  PMH: pre DM  PSH: hernia repair, right thigh & tendon repair surgery  Family History: Noncontributory  Social History: Ex smoker (quit in 2016). Reports occasional alcohol use (less than once/month). Denies drug use.      Previous Diagnostics:  CABG x 2 9.5.22:  LIMA - LAD, Reverse saphenous to PDA    [facility] 8.31.22  Left main-short, normal  Lad-99% prox, 80% Mid, Large diagonal occluded with circumflex collaterals  LCX-50% mid, IFR not significant  RCA-dominant, 80% distal involving PDA and  PLB  EDP 15 mmHg     Maximize medical management  Consult CT surgery for bypass to the LAD, diagonal, PLB, PDA   If patient is turned down for surgery consider staged intervention to LAD across the diagonal  Check out has been given the cardiology service and on-call Cardiology  CT surgery consult has been placed    Echo 8.31.22  The left ventricle is normal in size with concentric remodeling and mildly decreased systolic function.  The estimated ejection fraction is 42%.  Regional wall motion abnormalities of the LAD and RCA territories.  Grade I left ventricular diastolic dysfunction.  Normal right ventricular size with normal right ventricular systolic function.  The estimated PA systolic pressure is 14 mmHg    Review of Systems   Cardiovascular:  Positive for chest pain (incisional). Negative for palpitations.   Respiratory: Negative.  Negative for shortness of breath.    All other systems reviewed and are negative.    Objective:     Vital Signs (Most Recent):  Temp: 97.9 °F (36.6 °C) (09/06/22 1200)  Pulse: 77 (09/06/22 1330)  Resp: 20 (09/06/22 1330)  BP: 109/62 (09/06/22 1300)  SpO2: 96 % (09/06/22 1330)   Vital Signs (24h Range):  Temp:  [97 °F (36.1 °C)-99.7 °F (37.6 °C)] 97.9 °F (36.6 °C)  Pulse:  [] 77  Resp:  [16-30] 20  SpO2:  [90 %-100 %] 96 %  BP: ()/(53-88) 109/62  Arterial Line BP: ()/(49-73) 157/66     Weight: 73.4 kg (161 lb 13.1 oz)  Body mass index is 27.78 kg/m².    SpO2: 96 %  O2 Device (Oxygen Therapy): nasal cannula      Intake/Output Summary (Last 24 hours) at 9/6/2022 1341  Last data filed at 9/6/2022 0600  Gross per 24 hour   Intake 3747 ml   Output 3185 ml   Net 562 ml         Lines/Drains/Airways       Drain  Duration                  Chest Tube 09/05/22 1008 Left Mediastinal 24 Fr. 1 day         Chest Tube 09/05/22 1008 Right Mediastinal 24 Fr. 1 day              Peripheral Intravenous Line  Duration                  Peripheral IV - Single Lumen 09/03/22 0230 20 G  Left;Posterior Wrist 3 days                    Significant Labs:   Recent Results (from the past 72 hour(s))   PTT Heparin Monitoring    Collection Time: 09/04/22  7:33 AM   Result Value Ref Range    PTT Heparin Monitor 77.9 (H) 23.2 - 33.7 seconds   Type & Screen    Collection Time: 09/04/22 10:02 AM   Result Value Ref Range    Group & Rh A POS     Indirect Selena GEL NEG    Prepare RBC 4 Units; surgery    Collection Time: 09/04/22 10:02 AM   Result Value Ref Range    UNIT NUMBER [id number]     UNIT ABO/RH A POS     DISPENSE STATUS Released     Unit Expiration [id number]     Product Code H5649M85     Unit Blood Type Code 6200     CROSSMATCH INTERPRETATION Compatible     UNIT NUMBER [id number]     UNIT ABO/RH A POS     DISPENSE STATUS Released     Unit Expiration [id number]     Product Code Z3193H22     Unit Blood Type Code 6200     CROSSMATCH INTERPRETATION Compatible     UNIT NUMBER [id number]     UNIT ABO/RH A POS     DISPENSE STATUS Selected     Unit Expiration [id number]     Product Code N8129C28     Unit Blood Type Code 6200     CROSSMATCH INTERPRETATION Compatible     UNIT NUMBER [id number]     UNIT ABO/RH A POS     DISPENSE STATUS Selected     Unit Expiration [id number]     Product Code I6422H13     Unit Blood Type Code 6200     CROSSMATCH INTERPRETATION Compatible    COVID-19 Rapid Screening    Collection Time: 09/04/22 10:47 AM   Result Value Ref Range    SARS COV-2 MOLECULAR Negative Negative   Respiratory Culture    Collection Time: 09/04/22 10:47 AM    Specimen: Nasopharyngeal Swab   Result Value Ref Range    Respiratory Culture Normal respiratory joey    MRSA PCR    Collection Time: 09/04/22 10:47 AM   Result Value Ref Range    MRSA PCR SCRN ([facility]) Not Detected Not Detected   Urinalysis, Reflex to Urine Culture Urine, Clean Catch    Collection Time: 09/04/22  3:45 PM    Specimen: Urine   Result Value Ref Range    Color, UA Yellow Yellow, Colorless, Other, Clear    Appearance, UA  Clear Clear    Specific [place], UA 1.011 1.001 - 1.030    pH, UA 8.0 5.0, 5.5, 6.0, 6.5, 7.0, 7.5, 8.0, 8.5    Protein, UA Negative Negative, 300  mg/dL    Glucose, UA Negative Negative, Normal mg/dL    Ketones, UA Negative Negative, +1, +2, +3, +4, +5, >=160, >=80 mg/dL    Blood, UA Negative Negative unit/L    Bilirubin, UA Negative Negative mg/dL    Urobilinogen, UA 0.2 0.2, 1.0, Normal mg/dL    Nitrites, UA Negative Negative    Leukocyte Esterase, UA Negative Negative, 75  unit/L   Urinalysis, Microscopic    Collection Time: 09/04/22  3:45 PM   Result Value Ref Range    RBC, UA <5 <=5 /HPF    WBC, UA <5 <=5 /HPF    Squamous Epithelial Cells, UA <5 <=5 /HPF    Bacteria, UA None Seen None Seen, Rare, Occasional /HPF   Comprehensive Metabolic Panel    Collection Time: 09/05/22  3:50 AM   Result Value Ref Range    Sodium Level 139 136 - 145 mmol/L    Potassium Level 4.3 3.5 - 5.1 mmol/L    Chloride 107 98 - 107 mmol/L    Carbon Dioxide 22 (L) 23 - 31 mmol/L    Glucose Level 97 82 - 115 mg/dL    Blood Urea Nitrogen 13.9 8.4 - 25.7 mg/dL    Creatinine 0.81 0.73 - 1.18 mg/dL    Calcium Level Total 9.8 8.8 - 10.0 mg/dL    Protein Total 7.0 5.8 - 7.6 gm/dL    Albumin Level 3.9 3.4 - 4.8 gm/dL    Globulin 3.1 2.4 - 3.5 gm/dL    Albumin/Globulin Ratio 1.3 1.1 - 2.0 ratio    Bilirubin Total 0.3 <=1.5 mg/dL    Alkaline Phosphatase 55 40 - 150 unit/L    Alanine Aminotransferase 35 0 - 55 unit/L    Aspartate Aminotransferase 17 5 - 34 unit/L    eGFR >60 mls/min/1.73/m2   Magnesium    Collection Time: 09/05/22  3:50 AM   Result Value Ref Range    Magnesium Level 2.30 1.60 - 2.60 mg/dL   Protime-INR    Collection Time: 09/05/22  3:50 AM   Result Value Ref Range    PT 13.0 12.5 - 14.5 seconds    INR 0.99 0.00 - 1.30   Type & Screen    Collection Time: 09/05/22  3:50 AM   Result Value Ref Range    Group & Rh A POS     Indirect Selena GEL NEG    CBC with Differential    Collection Time: 09/05/22  3:51 AM   Result Value Ref Range     WBC 6.9 4.5 - 11.5 x10(3)/mcL    RBC 4.49 (L) 4.70 - 6.10 x10(6)/mcL    Hgb 13.3 (L) 14.0 - 18.0 gm/dL    Hct 41.6 (L) 42.0 - 52.0 %    MCV 92.7 80.0 - 94.0 fL    MCH 29.6 27.0 - 31.0 pg    MCHC 32.0 (L) 33.0 - 36.0 mg/dL    RDW 13.7 11.5 - 17.0 %    Platelet 332 130 - 400 x10(3)/mcL    MPV 10.8 (H) 7.4 - 10.4 fL    Neut % 58.9 %    Lymph % 25.5 %    Mono % 11.2 %    Eos % 3.3 %    Basophil % 0.7 %    Lymph # 1.77 0.6 - 4.6 x10(3)/mcL    Neut # 4.1 2.1 - 9.2 x10(3)/mcL    Mono # 0.78 0.1 - 1.3 x10(3)/mcL    Eos # 0.23 0 - 0.9 x10(3)/mcL    Baso # 0.05 0 - 0.2 x10(3)/mcL    IG# 0.03 0 - 0.04 x10(3)/mcL    IG% 0.4 %    NRBC% 0.0 %   POCT glucose    Collection Time: 09/05/22  4:52 AM   Result Value Ref Range    POCT Glucose 94 70 - 110 mg/dL   APTT    Collection Time: 09/05/22 10:25 AM   Result Value Ref Range    PTT 27.9 23.2 - 33.7 seconds   Basic Metabolic Panel    Collection Time: 09/05/22 10:25 AM   Result Value Ref Range    Sodium Level 141 136 - 145 mmol/L    Potassium Level 4.0 3.5 - 5.1 mmol/L    Chloride 115 (H) 98 - 107 mmol/L    Carbon Dioxide 19 (L) 23 - 31 mmol/L    Glucose Level 158 (H) 82 - 115 mg/dL    Blood Urea Nitrogen 11.1 8.4 - 25.7 mg/dL    Creatinine 0.85 0.73 - 1.18 mg/dL    BUN/Creatinine Ratio 13     Calcium Level Total 10.1 (H) 8.8 - 10.0 mg/dL    Anion Gap 7.0 mEq/L    eGFR >60 mls/min/1.73/m2   Protime-INR    Collection Time: 09/05/22 10:25 AM   Result Value Ref Range    PT 17.6 (H) 12.5 - 14.5 seconds    INR 1.47 (H) 0.00 - 1.30   CBC with Differential    Collection Time: 09/05/22 10:25 AM   Result Value Ref Range    WBC 13.8 (H) 4.5 - 11.5 x10(3)/mcL    RBC 3.30 (L) 4.70 - 6.10 x10(6)/mcL    Hgb 9.7 (L) 14.0 - 18.0 gm/dL    Hct 31.3 (L) 42.0 - 52.0 %    MCV 94.8 (H) 80.0 - 94.0 fL    MCH 29.4 27.0 - 31.0 pg    MCHC 31.0 (L) 33.0 - 36.0 mg/dL    RDW 13.6 11.5 - 17.0 %    Platelet 221 130 - 400 x10(3)/mcL    MPV 11.2 (H) 7.4 - 10.4 fL    Neut % 73.3 %    Lymph % 16.9 %    Mono % 6.2 %     Eos % 2.2 %    Basophil % 0.4 %    Lymph # 2.33 0.6 - 4.6 x10(3)/mcL    Neut # 10.1 (H) 2.1 - 9.2 x10(3)/mcL    Mono # 0.86 0.1 - 1.3 x10(3)/mcL    Eos # 0.30 0 - 0.9 x10(3)/mcL    Baso # 0.06 0 - 0.2 x10(3)/mcL    IG# 0.14 (H) 0 - 0.04 x10(3)/mcL    IG% 1.0 %    NRBC% 0.0 %   POCT glucose    Collection Time: 09/05/22 11:26 AM   Result Value Ref Range    POCT Glucose 170 (H) 70 - 110 mg/dL   POCT ARTERIAL BLOOD GAS    Collection Time: 09/05/22 11:55 AM   Result Value Ref Range    POC PH 7.36     POC PCO2 37 mmHg    POC PO2 84 mmHg    POC SATURATED O2 96 %    POC Potassium 3.7 mmol/l    POC Sodium 140 mmol/l    POC Ionized Calcium 1.54 (AA) 0.790 - 1.40 mmol/l    POC HCO3 20.9 mmol/l    Base Deficit -4.1 mmol/l    POC Temp 37.0 C    Specimen source Arterial sample    POCT glucose    Collection Time: 09/05/22 11:56 AM   Result Value Ref Range    POCT Glucose 157 (H) 70 - 110 mg/dL   POCT glucose    Collection Time: 09/05/22 12:59 PM   Result Value Ref Range    POCT Glucose 126 (H) 70 - 110 mg/dL   POCT glucose    Collection Time: 09/05/22  1:52 PM   Result Value Ref Range    POCT Glucose 113 (H) 70 - 110 mg/dL   POCT ARTERIAL BLOOD GAS    Collection Time: 09/05/22  2:43 PM   Result Value Ref Range    POC PH 7.37     POC PCO2 40 mmHg    POC PO2 106 (A) mmHg    POC SATURATED O2 98 %    POC Potassium 3.6 mmol/l    POC Sodium 140 mmol/l    POC Ionized Calcium 1.26 (A) 1.12 - 1.23 mmol/l    POC HCO3 23.1 mmol/l    Base Deficit -2.0 mmol/l    POC Temp 37.0 C    Specimen source Arterial sample    POCT glucose    Collection Time: 09/05/22  2:55 PM   Result Value Ref Range    POCT Glucose 122 (H) 70 - 110 mg/dL   Potassium    Collection Time: 09/05/22  3:44 PM   Result Value Ref Range    Potassium Level 4.2 3.5 - 5.1 mmol/L   Hemoglobin and Hematocrit    Collection Time: 09/05/22  3:44 PM   Result Value Ref Range    Hgb 9.3 (L) 14.0 - 18.0 gm/dL    Hct 29.6 (L) 42.0 - 52.0 %   POCT glucose    Collection Time: 09/05/22  3:53 PM    Result Value Ref Range    POCT Glucose 118 (H) 70 - 110 mg/dL   POCT glucose    Collection Time: 09/05/22  5:00 PM   Result Value Ref Range    POCT Glucose 122 (H) 70 - 110 mg/dL   POCT glucose    Collection Time: 09/05/22  5:49 PM   Result Value Ref Range    POCT Glucose 125 (H) 70 - 110 mg/dL   POCT glucose    Collection Time: 09/05/22  8:07 PM   Result Value Ref Range    POCT Glucose 98 70 - 110 mg/dL   POCT glucose    Collection Time: 09/05/22  9:13 PM   Result Value Ref Range    POCT Glucose 127 (H) 70 - 110 mg/dL   POCT glucose    Collection Time: 09/05/22 10:04 PM   Result Value Ref Range    POCT Glucose 125 (H) 70 - 110 mg/dL   POCT glucose    Collection Time: 09/05/22 11:05 PM   Result Value Ref Range    POCT Glucose 110 70 - 110 mg/dL   POCT glucose    Collection Time: 09/06/22 12:04 AM   Result Value Ref Range    POCT Glucose 108 70 - 110 mg/dL   POCT glucose    Collection Time: 09/06/22  1:16 AM   Result Value Ref Range    POCT Glucose 119 (H) 70 - 110 mg/dL   Basic metabolic panel    Collection Time: 09/06/22  1:23 AM   Result Value Ref Range    Sodium Level 138 136 - 145 mmol/L    Potassium Level 4.5 3.5 - 5.1 mmol/L    Chloride 107 98 - 107 mmol/L    Carbon Dioxide 24 23 - 31 mmol/L    Glucose Level 122 (H) 82 - 115 mg/dL    Blood Urea Nitrogen 9.0 8.4 - 25.7 mg/dL    Creatinine 0.89 0.73 - 1.18 mg/dL    BUN/Creatinine Ratio 10     Calcium Level Total 8.9 8.8 - 10.0 mg/dL    Anion Gap 7.0 mEq/L    eGFR >60 mls/min/1.73/m2   CBC with Differential    Collection Time: 09/06/22  1:23 AM   Result Value Ref Range    WBC 12.0 (H) 4.5 - 11.5 x10(3)/mcL    RBC 3.47 (L) 4.70 - 6.10 x10(6)/mcL    Hgb 10.4 (L) 14.0 - 18.0 gm/dL    Hct 32.0 (L) 42.0 - 52.0 %    MCV 92.2 80.0 - 94.0 fL    MCH 30.0 27.0 - 31.0 pg    MCHC 32.5 (L) 33.0 - 36.0 mg/dL    RDW 13.9 11.5 - 17.0 %    Platelet 251 130 - 400 x10(3)/mcL    MPV 11.0 (H) 7.4 - 10.4 fL    Neut % 81.2 %    Lymph % 6.7 %    Mono % 11.3 %    Eos % 0.0 %     Basophil % 0.2 %    Lymph # 0.81 0.6 - 4.6 x10(3)/mcL    Neut # 9.8 (H) 2.1 - 9.2 x10(3)/mcL    Mono # 1.36 (H) 0.1 - 1.3 x10(3)/mcL    Eos # 0.00 0 - 0.9 x10(3)/mcL    Baso # 0.02 0 - 0.2 x10(3)/mcL    IG# 0.07 (H) 0 - 0.04 x10(3)/mcL    IG% 0.6 %    NRBC% 0.0 %   POCT glucose    Collection Time: 09/06/22  2:07 AM   Result Value Ref Range    POCT Glucose 121 (H) 70 - 110 mg/dL   POCT glucose    Collection Time: 09/06/22  3:06 AM   Result Value Ref Range    POCT Glucose 109 70 - 110 mg/dL   POCT glucose    Collection Time: 09/06/22  4:12 AM   Result Value Ref Range    POCT Glucose 109 70 - 110 mg/dL   POCT glucose    Collection Time: 09/06/22  6:15 AM   Result Value Ref Range    POCT Glucose 117 (H) 70 - 110 mg/dL   POCT glucose    Collection Time: 09/06/22  8:20 AM   Result Value Ref Range    POCT Glucose 83 70 - 110 mg/dL   Transesophageal echo (VARUN)    Collection Time: 09/06/22  9:52 AM   Result Value Ref Range    BSA 1.75 m2       Telemetry: NSR    Physical Exam  Constitutional:       Appearance: Normal appearance.   HENT:      Head: Atraumatic.      Mouth/Throat:      Mouth: Mucous membranes are moist.   Eyes:      Extraocular Movements: Extraocular movements intact.   Cardiovascular:      Rate and Rhythm: Normal rate and regular rhythm.      Pulses: Normal pulses.      Heart sounds: Normal heart sounds.   Pulmonary:      Effort: Pulmonary effort is normal.      Breath sounds: Normal breath sounds.   Abdominal:      Palpations: Abdomen is soft.   Musculoskeletal:         General: Normal range of motion.   Skin:     General: Skin is warm and dry.      Comments: Chest tube in place  Midline incision with island dressing - c/d/i   Neurological:      Mental Status: He is alert and oriented to person, place, and time.   Psychiatric:         Behavior: Behavior normal.       Current Inpatient Medications:    Current Facility-Administered Medications:     0.9%  NaCl infusion (for blood administration), , Intravenous,  Q24H PRN, ITA Jerez    0.9%  NaCl infusion (for blood administration), , Intravenous, Q24H PRN, ITA Jerez    0.9%  NaCl infusion (for blood administration), , Intravenous, Q24H PRN, Adolfo Coleman MD    acetaminophen oral solution 650 mg, 650 mg, Per OG tube, Q6H PRN, Ross Gutierrez PA-C    acetaminophen tablet 1,000 mg, 1,000 mg, Oral, Q6H PRN, Yomi Milligan MD, 1,000 mg at 08/31/22 1934    acetaminophen tablet 650 mg, 650 mg, Oral, Q4H PRN, Yomi Milligan MD    acetaminophen tablet 650 mg, 650 mg, Oral, Q4H PRN, Luis Daniel Maza MD, 650 mg at 09/03/22 2042    albumin human 5% bottle 12.5 g, 12.5 g, Intravenous, PRN, Ross Gutierrez PA-C, Stopped at 09/05/22 1621    aluminum-magnesium hydroxide-simethicone 200-200-20 mg/5 mL suspension 30 mL, 30 mL, Oral, QID PRN, Yomi Milligan MD    aspirin chewable tablet 81 mg, 81 mg, Oral, Daily, Yomi Milligan MD, 81 mg at 09/06/22 0735    atorvastatin tablet 40 mg, 40 mg, Oral, QHS, Bahman Riojas MD, 40 mg at 09/05/22 2116    benzonatate capsule 200 mg, 200 mg, Oral, TID PRN, Yomi Milligan MD    cefazolin (ANCEF) 2 gram in dextrose 5% 50 mL IVPB (premix), 2 g, Intravenous, On Call Procedure, ITA Jerez    clopidogreL tablet 75 mg, 75 mg, Oral, Daily, Ross Gutierrez PA-C, 75 mg at 09/06/22 0807    dextromethorphan-guaiFENesin  mg/5 ml liquid 5 mL, 5 mL, Oral, Q4H PRN, Yomi Milligan MD    dextrose 10% bolus 125 mL, 12.5 g, Intravenous, PRN, Ross Gutierrez PA-C    dextrose 10% bolus 250 mL, 25 g, Intravenous, PRN, Ross Gutierrez PA-C    docusate sodium capsule 100 mg, 100 mg, Oral, BID, Ross Gutierrez PA-C, 100 mg at 09/06/22 0807    famotidine tablet 20 mg, 20 mg, Oral, BID, Yomi Milligan MD, 20 mg at 09/06/22 0807    folic acid tablet 1 mg, 1 mg, Oral, Daily, Ross Gutierrez PA-C, 1 mg at 09/06/22 0807    HYDROcodone-acetaminophen 5-325 mg per tablet 1 tablet, 1 tablet, Oral, Q4H PRN, Ross Gutierrez PA-C, 1 tablet at 09/06/22 1318    iopamidoL  (ISOVUE-370) injection, , , PRN, Luis Daniel Maza MD, 100 mL at 08/31/22 1635    lactulose 10 gram/15 ml solution 20 g, 20 g, Oral, Q6H PRN, Ross Gutierrez PA-C    LIDOcaine HCL 10 mg/ml (1%) injection, , , PRN, Luis Daniel Maza MD, 10 mL at 08/31/22 1546    loperamide capsule 4 mg, 4 mg, Oral, Once, Ross Gutierrez PA-C    magnesium sulfate 2g in water 50mL IVPB (premix), 2 g, Intravenous, PRN, Ross Gutierrez PA-C    magnesium sulfate 2g in water 50mL IVPB (premix), 4 g, Intravenous, PRN, Ross Gutierrez PA-C    melatonin tablet 6 mg, 6 mg, Oral, Nightly PRN, Yomi Milligan MD    metoclopramide HCl injection 5 mg, 5 mg, Intravenous, Q6H PRN, Ross Gutierrez PA-C    metoprolol tartrate (LOPRESSOR) split tablet 12.5 mg, 12.5 mg, Oral, BID, Ross Gutierrez PA-C, 12.5 mg at 09/06/22 0807    mupirocin 2 % ointment, , Nasal, On Call Procedure, ITA Jerez    mupirocin 2 % ointment, , Nasal, BID, Ross Gutierrez PA-C, Given at 09/06/22 0810    nitroGLYCERIN injection, , , PRN, Luis Daniel Maza MD, 200 mcg at 08/31/22 1603    nitroGLYCERIN SL tablet 0.4 mg, 0.4 mg, Sublingual, Q5 Min PRN, Yomi Milligan MD    ondansetron disintegrating tablet 8 mg, 8 mg, Oral, Q8H PRN, Luis Daniel Maza MD    ondansetron injection 4 mg, 4 mg, Intravenous, Q4H PRN, Yomi Milligan MD, 4 mg at 09/05/22 2122    ondansetron injection 4 mg, 4 mg, Intravenous, Q12H PRN, Ross Gutierrez PA-C    oxyCODONE immediate release tablet 5 mg, 5 mg, Oral, Q4H PRN, Ross Gutierrez PA-C, 5 mg at 09/06/22 0525    polyethylene glycol packet 17 g, 17 g, Oral, BID PRN, Yomi Milligan MD    prochlorperazine injection Soln 5 mg, 5 mg, Intravenous, Q6H PRN, Yomi Milligan MD    senna-docusate 8.6-50 mg per tablet 1 tablet, 1 tablet, Oral, BID PRN, Yomi Milligan MD    simethicone chewable tablet 80 mg, 1 tablet, Oral, QID PRN, Yomi Milligan MD    sodium chloride 0.9% flush 10 mL, 10 mL, Intravenous, PRN, Yomi Milligan MD    sodium chloride 0.9% flush 10 mL,  10 mL, Intravenous, PRN, SHELBY Coker    sodium phosphate 15 mmol in dextrose 5 % 250 mL IVPB, 15 mmol, Intravenous, PRN, Ross Gutierrez PA-C    sodium phosphate 20.01 mmol in dextrose 5 % 250 mL IVPB, 20.01 mmol, Intravenous, PRN, Ross Gutierrez PA-C    sodium phosphate 30 mmol in dextrose 5 % 250 mL IVPB, 30 mmol, Intravenous, PRN, Ross Gutierrez PA-C    sucralfate tablet 1 g, 1 g, Oral, QID (AC & HS), Ross Gutierrez PA-C, 1 g at 09/06/22 0645    verapamiL injection, , , PRN, Luis Daniel Maza MD, 2.5 mg at 08/31/22 1548    VTE Risk Mitigation (From admission, onward)           Ordered     IP VTE LOW RISK PATIENT  Once         08/30/22 2316     Place sequential compression device  Until discontinued         08/30/22 2316                    Assessment:   Multi Vessel CAD    - Left main-normal; LAD-99% prox, mid, large diagonal occluded with circumflex collaterals; Left CX-50% mid, IFR not significant; RCA-dominant, 80% distal involving PDA and PLB; EDP 15 mm HG.    - CABG x 2 (9.5.22): LIMA - LAD, Reverse saphenous to PDA  NSTEMI -Type 1    - Trop 4.00 trending down    - EKG changes     - Currently CP free  Acute Systolic/Diastolic Heart Failure-euvolemic on exam    - EF is 42%    - Grade I DD    - regional wall motion abnormalities of the LAD and RCA territories  Active COVID 19 infection - now negative  Transaminitis - now resolved  Pre DM  No Hx of GIB    Plan:   Continue Asa/BB/Statin  Consider adding ARB s/t decrease in LVEF when BP more stable.  Chest tube management per [facility].  Incentive spirometer hourly and aggressive mobilization.  Labs and EKG in AM: CBC & BMP    SHELBY Coker  Cardiology  [facility]   09/06/2022

## 2022-09-06 NOTE — PLAN OF CARE
Problem: Adult Inpatient Plan of Care  Goal: Plan of Care Review  Outcome: Ongoing, Progressing  Goal: Patient-Specific Goal (Individualized)  Outcome: Ongoing, Progressing  Goal: Absence of Hospital-Acquired Illness or Injury  Outcome: Ongoing, Progressing  Goal: Optimal Comfort and Wellbeing  Outcome: Ongoing, Progressing  Goal: Readiness for Transition of Care  Outcome: Ongoing, Progressing     Problem: Chest Pain  Goal: Resolution of Chest Pain Symptoms  Outcome: Ongoing, Progressing     Problem: Infection  Goal: Absence of Infection Signs and Symptoms  Outcome: Ongoing, Progressing     Problem: Fall Injury Risk  Goal: Absence of Fall and Fall-Related Injury  Outcome: Ongoing, Progressing     Problem: Skin Injury Risk Increased  Goal: Skin Health and Integrity  Outcome: Ongoing, Progressing     Problem: Skin and Tissue Injury (Artificial Airway)  Goal: Absence of Device-Related Skin or Tissue Injury  Outcome: Ongoing, Progressing

## 2022-09-06 NOTE — PROGRESS NOTES
[facility]  Hospital Medicine Progress Note        Chief Complaint: Inpatient Follow-up for NSTEMI    HPI: This is a 66-year-old male with medical history of former cigarette smoker quit 2016 present to [facility] ED with complaint of chest pain that started  today 8/30/2022 around 10:30 AM.  Described the pain as pressure-like substernal, nonradiating and associated with diaphoresis, pain lasted for about 1 hour and improved with sublingual nitro spray given with EMS and again in the emergency room.  On arrival he was noted to be hemodynamically stable and afebrile and saturating above 95% on room air. EKG  sinus rhythm and showed T-wave inversion lateral leads. Chest x-ray showed normal cardiac silhouette and no parenchymal lung opacities. Labs notable for troponin 1.078,  elevated transaminases and COVID-19 positive.  He was given nitro paste and currently is chest pain-free.  Cardiology consulted and transferred to [facility] and referred to hospital medicine service for further evaluation and management.    Interval Hx:   Sitting in chair, postop CABG day 1   Reported he got up and walked with the physical therapy this morning  Chest tube in place   No family at bedside  Case discussed with patient's nurse in ICU    Objective/physical exam:  General: In no acute distress, afebrile  Chest:  Decreased entry bilaterally due to limitation from surgery.  Chest tube present  Heart: RRR, +S1, S2, no appreciable murmur.  Dressing over sternotomy  Abdomen: Soft, nontender, BS +  MSK: Warm, no lower extremity edema, no clubbing or cyanosis  Neurologic: Alert and oriented x4, Cranial nerve II-XII intact, Strength 5/5 in all 4 extremities    VITAL SIGNS: 24 HRS MIN & MAX LAST   Temp  Min: 97.9 °F (36.6 °C)  Max: 98.4 °F (36.9 °C) 98.2 °F (36.8 °C)   BP  Min: 94/70  Max: 139/75 106/68   Pulse  Min: 73  Max: 99  87   Resp  Min: 16  Max: 29 (!) 22   SpO2  Min: 90 %  Max: 100 % 95 %       Recent Labs    Lab 09/05/22  0351 09/05/22  1025 09/05/22  1544 09/06/22  0123   WBC 6.9 13.8*  --  12.0*   RBC 4.49* 3.30*  --  3.47*   HGB 13.3* 9.7* 9.3* 10.4*   HCT 41.6* 31.3* 29.6* 32.0*   MCV 92.7 94.8*  --  92.2   MCH 29.6 29.4  --  30.0   MCHC 32.0* 31.0*  --  32.5*   RDW 13.7 13.6  --  13.9    221  --  251   MPV 10.8* 11.2*  --  11.0*       Recent Labs   Lab 09/01/22  0300 09/02/22  0401 09/03/22  0422 09/05/22  0350 09/05/22  1025 09/05/22  1155 09/05/22  1443 09/05/22  1544 09/06/22  0123    138 141 139 141  --   --   --  138   K 4.4 4.0 4.6 4.3 4.0  --   --  4.2 4.5   CO2 20* 21* 24 22* 19*  --   --   --  24   BUN 8.0* 11.9 13.4 13.9 11.1  --   --   --  9.0   CREATININE 0.71* 0.75 0.82 0.81 0.85  --   --   --  0.89   CALCIUM 9.3 9.4 9.5 9.8 10.1*  --   --   --  8.9   PH  --   --   --   --   --  7.36 7.37  --   --    MG 2.30 2.20  --  2.30  --   --   --   --   --    ALBUMIN 3.8 3.9 3.8 3.9  --   --   --   --   --    ALKPHOS 49 48 50 55  --   --   --   --   --    ALT 77* 53 47 35  --   --   --   --   --    AST 47* 25 21 17  --   --   --   --   --    BILITOT 0.3 0.4 0.4 0.3  --   --   --   --   --           Microbiology Results (last 7 days)       Procedure Component Value Units Date/Time    Respiratory Culture [[id number]] Collected: 09/04/22 1047    Order Status: Completed Specimen: Nasopharyngeal Swab Updated: 09/06/22 0738     Respiratory Culture Normal respiratory joey    MRSA Screen by PCR [[id number]] Collected: 09/04/22 1047    Order Status: Canceled Specimen: Nasopharyngeal Swab from Nasal Updated: 09/04/22 1053             See below for Radiology    Scheduled Med:   aspirin  81 mg Oral Daily    atorvastatin  40 mg Oral QHS    clopidogreL  75 mg Oral Daily    docusate sodium  100 mg Oral BID    enoxaparin  40 mg Subcutaneous Daily    famotidine  20 mg Oral BID    folic acid  1 mg Oral Daily    loperamide  4 mg Oral Once    metoprolol tartrate  12.5 mg Oral BID    mupirocin   Nasal BID    sucralfate   1 g Oral QID (AC & HS)        Continuous Infusions:         PRN Meds:  sodium chloride, sodium chloride, sodium chloride, acetaminophen, acetaminophen, acetaminophen, acetaminophen, albumin human 5%, aluminum-magnesium hydroxide-simethicone, benzonatate, ceFAZolin (ANCEF) IVPB, dextromethorphan-guaiFENesin  mg/5 ml, dextrose 10%, dextrose 10%, HYDROcodone-acetaminophen, iopamidoL, lactulose 10 gram/15 ml, LIDOcaine HCL 10 mg/ml (1%), magnesium sulfate IVPB, magnesium sulfate IVPB, melatonin, metoclopramide HCl, mupirocin, nitroGLYCERIN, nitroGLYCERIN, ondansetron, ondansetron, ondansetron, oxyCODONE, polyethylene glycol, prochlorperazine, senna-docusate 8.6-50 mg, simethicone, sodium chloride 0.9%, sodium chloride 0.9%, sodium phosphate IVPB, sodium phosphate IVPB, sodium phosphate IVPB, verapamiL       Assessment/Plan:  NSTEMI -  type 1- on heparin  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 Regional wall motion abnormalities of the LAD and RCA territories. Grade I left ventricular diastolic dysfunction.  8/31- Underwent LHC --> multivessel disease, consulted cardiothoracic surger  Underwent CABG 09/05/2022 with Dr. Coleman  Postop day 1  Chest tube present, continue with chest tube care  Heparin drip discontinued post op  Cont Metoprolol succinate 12.5 mg daily, aspirin 81 mg daily  Liver enzyme normalized, resumed atorvastatin   Abdominal US--> hepatic steatosis   Hepatitis viral panel-nonreactive   HIV- nonreactive  UDS positive for THC   Maintain covid isolation and precautions per CDC guideline  D-dimer was normal  CRP 2.19 and ferritin 69, both wnl  Morning CBC, CMP, Mag, ordered      VTE prophylaxis:  Lovenox 40 mg subQ daily    Patient condition:   Stable    Anticipated discharge and Disposition:   TBD        All diagnosis and differential diagnosis have been reviewed; assessment and plan has been documented; I have personally reviewed the labs and test results that are presently available; I have reviewed the patients medication list; I have reviewed the consulting providers response and recommendations. I have reviewed or attempted to review medical records based upon their availability    All of the patient's questions have been  addressed and answered. Patient's is agreeable to the above stated plan. I will continue to monitor closely and make adjustments to medical management as needed.  _____________________________________________________________________    Nutrition Status:    Radiology:  X-Ray Chest AP Portable  Narrative: EXAMINATION:  XR CHEST AP PORTABLE    CLINICAL HISTORY:  Post-op;    TECHNIQUE:  One view    COMPARISON:  September 5, 2022.    FINDINGS:  Cardiopericardial silhouette enlarged appearance is similar.  Sternotomy changes.  Interval extubation and removal of the nasogastric tube.  Right IJ sheet and drainage catheters are in similar location.  There is minimal left pleural effusion and left basilar atelectasis.  No consolidation, pulmonary edema or pneumothorax.  Impression: Minimal left pleural effusion.    Electronically signed by: Jason Tsai  Date:    09/06/2022  Time:    06:35      Bahman Riojas MD   09/06/2022

## 2022-09-06 NOTE — PROGRESS NOTES
"Inpatient Nutrition Evaluation    Admit Date: 8/30/2022   Total duration of encounter: 7 days    Nutrition Recommendation/Prescription     Advance diet as tolerated to heart healthy   RD to monitor po intake and weight changes    Nutrition Assessment     Chart Review    Reason Seen: length of stay    Diagnosis: Multivessel CAD s/p CABG on 09/05 (left internal mammary artery to the LAD, and reverse saphenous vein graft to the PDA. )  COVID positive  Normocytic Anemia      Relevant Medical History: tobacco abuse    Nutrition-Related Medications: NaCl PRN, colace BID, electrolyte infusion, pepcid BID, folic acid daily, zofran PRN, miralax PRN, senokot PRN. carafate    Nutrition-Related Labs: 9/6: WBC- 12, RBC-3.47, H/H-10.4/32, glu-122      Diet Order: Diet Clear liquid (no sugar)/Bariatric  Oral Supplement Order: none at this time  Appetite/Oral Intake: good/% of meals  Factors Affecting Nutritional Intake: none identified at this time  Food/Zoroastrianism/Cultural Preferences: none reported    Skin Integrity: incision, drain/device(s)  Wound(s):       Comments    9/6: Pt on CL starting 9/6; per RN, will advance diet for dinner today. Pt reports good appetite with no n/v/d/c.  with recent intentional weight loss. Latest weight of 73.4 kg (161 lb) on 9/1.    Anthropometrics    Height: 5' 4" (162.6 cm)    Last Weight: 73.4 kg (161 lb 13.1 oz) (09/01/22 0500) Weight Method: Bed Scale  BMI (Calculated): 27.8  BMI Classification: overweight (BMI 25-29.9)        Ideal Body Weight (IBW), Male: 130 lb  % Ideal Body Weight, Male (lb): 115.38 %                   Wt Readings from Last 5 Encounters:   09/01/22 73.4 kg (161 lb 13.1 oz)   08/30/22 68 kg (150 lb)     Weight Change(s) Since Admission:  Admit Weight: 68 kg (150 lb) (08/30/22 1935)      Patient Education    Not applicable.    Monitoring & Evaluation     Dietitian will monitor food and beverage intake and weight change.  Nutrition Risk/Follow-Up: low (follow-up " in 5-7 days)  Patients assigned 'low nutrition risk' status do not qualify for a full nutritional assessment but will be monitored and re-evaluated in a 5-7 day time period.    Nargis Mejia, Registration Eligible, Provisional LDN

## 2022-09-06 NOTE — PROGRESS NOTES
[facility]  Pulmonary/Critical Care - Medicine  Progress Note    Patient Name: Gustavo Cheung  MRN: [id number]  Admission Date: 8/30/2022  Hospital Length of Stay: 6 days  Code Status: Full Code  Attending Provider: PURA Saenz MD  Primary Care Provider: No primary care provider on file.     Subjective:     HPI:  Patient is a 67-year-old  male who presented to [place] emergency department on 08/30 with complaints of chest pain.  On admission, patient noted to be COVID positive and have elevated troponin.  EKG showed T-wave inversion in lateral leads.  Found to have multivessel CAD, CT surgery was consulted and patient had CABG done on 9/5.  Patient had left internal mammary artery to the LAD, and reverse saphenous vein graft to the PDA.  Patient tolerated the procedure well was transferred to the ICU for postoperative care.  Patient remains intubated and sedated at this time  Significant Events:  09/05/2022 coronary bypass graft x2 lima to LAD, reverse saphenous to PDA.  Interval History:  Patient was extubated, patient has remained off of vasopressors and insulin drips at this time.  Patient says that he is sore, and he has not had any bowel movements yet.  Patient says that he has not been doing incentive spirometry.  Other than that patient has no complaints at this time.    Objective:     Current Medications:     Current Facility-Administered Medications:     0.9%  NaCl infusion (for blood administration), , Intravenous, Q24H PRN, ITA Jerez    0.9%  NaCl infusion (for blood administration), , Intravenous, Q24H PRN, ITA Jerez    0.9%  NaCl infusion (for blood administration), , Intravenous, Q24H PRN, Adolfo Coleman MD    acetaminophen oral solution 650 mg, 650 mg, Per OG tube, Q6H PRN, Ross Gutierrez PA-C    acetaminophen tablet 1,000 mg, 1,000 mg, Oral, Q6H PRN, Yomi Milligan MD, 1,000 mg at 08/31/22 1934    acetaminophen tablet 650 mg, 650 mg, Oral, Q4H PRN,  Yomi Milligan MD    acetaminophen tablet 650 mg, 650 mg, Oral, Q4H PRN, Luis Daniel Maza MD, 650 mg at 09/03/22 2042    albumin human 5% bottle 12.5 g, 12.5 g, Intravenous, PRN, Ross Gutierrez PA-C, Stopped at 09/05/22 1621    aluminum-magnesium hydroxide-simethicone 200-200-20 mg/5 mL suspension 30 mL, 30 mL, Oral, QID PRN, Yomi Milligan MD    aspirin chewable tablet 81 mg, 81 mg, Oral, Daily, Yomi Milligan MD, 81 mg at 09/04/22 1040    atorvastatin tablet 40 mg, 40 mg, Oral, QHS, Bahman Riojas MD, 40 mg at 09/05/22 2116    benzonatate capsule 200 mg, 200 mg, Oral, TID PRN, Yomi Milligan MD    calcium gluconate 1 g in NS IVPB (premixed), 1 g, Intravenous, PRN, Ross Gutierrez PA-C    calcium gluconate 1 g in NS IVPB (premixed), 2 g, Intravenous, PRN, Ross Gutierrez PA-C    calcium gluconate 1 g in NS IVPB (premixed), 3 g, Intravenous, PRN, Ross Gutierrez PA-C    cefazolin (ANCEF) 2 gram in dextrose 5% 50 mL IVPB (premix), 2 g, Intravenous, On Call Procedure, ITA Jerez    cefazolin (ANCEF) 2 gram in dextrose 5% 50 mL IVPB (premix), 2 g, Intravenous, Q8H, Ross Gutierrez PA-C, Stopped at 09/06/22 0034    clevidipine (CLEVIPREX) 25 mg/50 mL infusion, 0-16 mg/hr, Intravenous, Continuous, PURA Saenz MD, Paused at 09/06/22 0100    clopidogreL tablet 75 mg, 75 mg, Oral, Daily, Ross Gutierrez PA-C    dexmedetomidine (PRECEDEX) 400mcg/100mL 0.9% NaCL infusion, 0-1.4 mcg/kg/hr, Intravenous, Continuous, Ross Gutierrez PA-C    dextromethorphan-guaiFENesin  mg/5 ml liquid 5 mL, 5 mL, Oral, Q4H PRN, Yomi Milligan MD    dextrose 10% bolus 125 mL, 12.5 g, Intravenous, PRN, Ross Gutierrez PA-C    dextrose 10% bolus 250 mL, 25 g, Intravenous, PRN, Ross Gutierrez PA-C    dextrose 5 % and 0.45 % NaCl infusion, , Intravenous, Continuous, Ross Gutierrez PA-C, Last Rate: 100 mL/hr at 09/05/22 1221, New Bag at 09/05/22 1221    docusate sodium capsule 100 mg, 100 mg, Oral, BID, Ross Gutierrez PA-C, 100 mg at  09/05/22 2116    electrolyte-A infusion 1,000 mL, 1,000 mL, Intravenous, Continuous, PURA Saenz MD, Last Rate: 50 mL/hr at 09/05/22 1233, No Dose/Rate Change at 09/05/22 1233    EPINEPHrine (ADRENALIN) 5 mg in dextrose 5 % 250 mL infusion, 0-2 mcg/kg/min, Intravenous, Continuous, PURA Saenz MD, Last Rate: 2.2 mL/hr at 09/05/22 1800, 0.01 mcg/kg/min at 09/05/22 1800    famotidine tablet 20 mg, 20 mg, Oral, BID, Yomi Milligan MD, 20 mg at 09/05/22 2116    fentaNYL 50 mcg/mL injection, , , PRN, Luis Daniel Maza MD, 50 mcg at 08/31/22 1546    folic acid tablet 1 mg, 1 mg, Oral, Daily, Ross Gutierrez PA-C    HYDROcodone-acetaminophen 5-325 mg per tablet 1 tablet, 1 tablet, Oral, Q4H PRN, Ross Gutierrez PA-C, 1 tablet at 09/06/22 0113    insulin regular in 0.9 % NaCl 100 unit/100 mL (1 unit/mL) infusion, 0-52 Units/hr, Intravenous, Continuous, Ross Gutierrez PA-C, Stopped at 09/05/22 2000    iopamidoL (ISOVUE-370) injection, , , PRN, Luis Daniel Maza MD, 100 mL at 08/31/22 1635    lactulose 10 gram/15 ml solution 20 g, 20 g, Oral, Q6H PRN, Ross Gutierrez PA-C    LIDOcaine HCL 10 mg/ml (1%) injection, , , PRN, Luis Daniel Maza MD, 10 mL at 08/31/22 1546    loperamide capsule 4 mg, 4 mg, Oral, Once, Ross Gutierrez PA-C    magnesium sulfate 2g in water 50mL IVPB (premix), 2 g, Intravenous, PRN, Ross Gutierrez PA-C    magnesium sulfate 2g in water 50mL IVPB (premix), 4 g, Intravenous, PRN, Ross Gutierrez PA-C    melatonin tablet 6 mg, 6 mg, Oral, Nightly PRN, Yomi Milligan MD    metoclopramide HCl injection 5 mg, 5 mg, Intravenous, Q6H PRN, Ross Gutierrez PA-C    metoprolol tartrate (LOPRESSOR) split tablet 12.5 mg, 12.5 mg, Oral, BID, Ross Gutierrez PA-C    midazolam (VERSED) 1 mg/mL injection, , , PRN, Luis Daniel Maza MD, 1 mg at 08/31/22 1551    morphine injection 2 mg, 2 mg, Intravenous, PRN, Ross Gutierrez PA-C, 2 mg at 09/05/22 1915    mupirocin 2 % ointment, , Nasal, On Call  Procedure, ITA Jerez    mupirocin 2 % ointment, , Nasal, BID, Ross Gutierrez PA-C, Given at 09/05/22 2122    nitroGLYCERIN injection, , , PRN, Luis Daniel Maza MD, 200 mcg at 08/31/22 1603    nitroGLYCERIN SL tablet 0.4 mg, 0.4 mg, Sublingual, Q5 Min PRN, Yomi Milligan MD    ondansetron disintegrating tablet 8 mg, 8 mg, Oral, Q8H PRN, Luis Daniel Maza MD    ondansetron injection 4 mg, 4 mg, Intravenous, Q4H PRN, Yomi Milligan MD, 4 mg at 09/05/22 2122    ondansetron injection 4 mg, 4 mg, Intravenous, Q12H PRN, Ross Gutierrez PA-C    oxyCODONE immediate release tablet 5 mg, 5 mg, Oral, Q4H PRN, Ross Gutierrez PA-C, 5 mg at 09/05/22 2116    polyethylene glycol packet 17 g, 17 g, Oral, BID PRN, Yomi Milligan MD    prochlorperazine injection Soln 5 mg, 5 mg, Intravenous, Q6H PRN, Yomi Milligan MD    senna-docusate 8.6-50 mg per tablet 1 tablet, 1 tablet, Oral, BID PRN, Yomi Milligan MD    simethicone chewable tablet 80 mg, 1 tablet, Oral, QID PRN, Yomi Milligan MD    sodium chloride 0.9% flush 10 mL, 10 mL, Intravenous, PRN, Yomi Milligan MD    sodium chloride 0.9% flush 10 mL, 10 mL, Intravenous, PRN, Mouna Rm, SHELBY    sodium phosphate 15 mmol in dextrose 5 % 250 mL IVPB, 15 mmol, Intravenous, PRN, Ross Gutierrez PA-C    sodium phosphate 20.01 mmol in dextrose 5 % 250 mL IVPB, 20.01 mmol, Intravenous, PRN, Ross Gutierrez PA-C    sodium phosphate 30 mmol in dextrose 5 % 250 mL IVPB, 30 mmol, Intravenous, PRN, Ross Gutierrez PA-C    sucralfate tablet 1 g, 1 g, Oral, QID (AC & HS), Ross Gutierrez PA-C    verapamiL injection, , , PRN, Luis Daniel Maza MD, 2.5 mg at 08/31/22 1548    Input/output:     Intake/Output Summary (Last 24 hours) at 9/6/2022 0458  Last data filed at 9/6/2022 0400  Gross per 24 hour   Intake 5918 ml   Output 4610 ml   Net 1308 ml       Vital Signs (Most Recent):  Temp: 98.4 °F (36.9 °C) (09/06/22 0400)  Pulse: 87 (09/06/22 0430)  Resp: (!) 22 (09/06/22 0430)  BP: 125/86  (09/06/22 0430)  SpO2: 98 % (09/06/22 0430)    Body mass index is 27.78 kg/m².  Weight: 73.4 kg (161 lb 13.1 oz) Vital Signs (24h Range):  Temp:  [95.9 °F (35.5 °C)-99.7 °F (37.6 °C)] 98.4 °F (36.9 °C)  Pulse:  [] 87  Resp:  [14-30] 22  SpO2:  [90 %-100 %] 98 %  BP: ()/(49-87) 125/86  Arterial Line BP: ()/(45-73) 145/66     General:  Well developed, well nourished, no acute respiratory distress  Head: Normocephalic, atraumatic  Eyes: PERRL, anicteric sclera  Throat: No posterior pharyngeal erythema or exudate, no tonsillar exudate  Neck: supple, normal ROM, no JVD  CVS:  RRR, S1 and S2 normal, no murmurs, no added heart sounds, rubs, gallops, regular peripheral pulses, and no peripheral edema.  Chest tube in place with approximately 200 cc of bloody drainage.  Resp:  Lungs clear to auscultation bilaterally, no wheezes, rales, or rhonci  GI:  Abdomen soft, non-tender, non-distended, normoactive bowel sounds  MSK:  Full range of motion, no obvious deformities   Skin:  No rashes, ulcers, erythema  Neuro:  Alert and oriented x3, No focal neuro deficits, CNII-XII grossly intact  Psych:  Appropriate mood and affect       ABGs:   Lab Results   Component Value Date    PH 7.37 09/05/2022    PO2 106 (A) 09/05/2022    PCO2 40 09/05/2022           Significant Labs:     Lab Results   Component Value Date    WBC 12.0 (H) 09/06/2022    HGB 10.4 (L) 09/06/2022    HCT 32.0 (L) 09/06/2022    MCV 92.2 09/06/2022     09/06/2022         Recent Labs   Lab 09/05/22  1025 09/05/22  1544 09/06/22  0123     --  138   K 4.0   < > 4.5   CO2 19*  --  24   BUN 11.1  --  9.0   CREATININE 0.85  --  0.89   CALCIUM 10.1*  --  8.9   INR 1.47*  --   --    PROTIME 17.6*  --   --    PTT 27.9  --   --     < > = values in this interval not displayed.             Imaging:   X-ray by my read in comparison to yesterday's x-ray shows blurred the left heart border with possible infiltrate, left costophrenic angle is not as  viewable as it was yesterday.  Chest tubes are in place.  No occult pneumothorax or hemothorax noted.  Central line is within place.    DVT Prophylaxis:  None  GI prophylaxis:  Famotidine    Assessment/Plan:   1.)Multivessel CAD s/p CABG on 09/05 (left internal mammary artery to the LAD, and reverse saphenous vein graft to the PDA. )  2.)COVID positive  3.)Normocytic Anemia  Plan:    -lines, tubes, drains per cardiovascular surgery protocol   -incentive spirometry q.1 hour, and cardio rehab  -patient has remained off of vasopressor and insulin drip   -H/H is stable, electrolytes are within normal limits.  K>4, Phos>3, Mg>2. Patient can be downgraded to the floor pending evaluation by attending physician       Greater than 30 minutes of critical care was time spent personally by me on the following activities: development of treatment plan with patient or surrogate and bedside caregivers, discussions with consultants, evaluation of patient's response to treatment, examination of patient, ordering and performing treatments and interventions, ordering and review of laboratory studies, ordering and review of radiographic studies, pulse oximetry, re-evaluation of patient's condition.  This critical care time did not overlap with that of any other provider or involve time for any procedures.     Quin Tim MD  Pulmonary/Critical Care  [facility]

## 2022-09-06 NOTE — PROGRESS NOTES
09/06/22 1255   Pre Exercise Vitals   /62   Pulse 80   Supplemental O2? Yes   O2 Device nasal cannula   O2 Flow (L/min) 2   SpO2 97 %   During Exercise Vitals   Pulse 88   Supplemental O2? Yes   O2 Device nasal cannula   O2 Flow (L/min) 2   SpO2 91 %   Distance Walked 150 feet   Post Exercise Vitals   /87   Pulse 79   Supplemental O2? Yes   O2 Device nasal cannula   O2 Flow (L/min) 2   Modality   Modality Walker  (Standby assist only. sternal precautions maintained. Gait steady and strong.)

## 2022-09-07 LAB
ABS NEUT (OLG): 10.46 X10(3)/MCL (ref 2.1–9.2)
ANION GAP SERPL CALC-SCNC: 6 MEQ/L
BUN SERPL-MCNC: 8.7 MG/DL (ref 8.4–25.7)
BURR CELLS (OLG): ABNORMAL
CALCIUM SERPL-MCNC: 9 MG/DL (ref 8.8–10)
CHLORIDE SERPL-SCNC: 108 MMOL/L (ref 98–107)
CO2 SERPL-SCNC: 23 MMOL/L (ref 23–31)
CREAT SERPL-MCNC: 0.72 MG/DL (ref 0.73–1.18)
CREAT/UREA NIT SERPL: 12
ERYTHROCYTE [DISTWIDTH] IN BLOOD BY AUTOMATED COUNT: 14.1 % (ref 11.5–17)
GFR SERPLBLD CREATININE-BSD FMLA CKD-EPI: >60 MLS/MIN/1.73/M2
GLUCOSE SERPL-MCNC: 116 MG/DL (ref 82–115)
HCT VFR BLD AUTO: 32.6 % (ref 42–52)
HGB BLD-MCNC: 10.1 GM/DL (ref 14–18)
IMM GRANULOCYTES # BLD AUTO: 0.07 X10(3)/MCL (ref 0–0.04)
IMM GRANULOCYTES NFR BLD AUTO: 0.6 %
INSTRUMENT WBC (OLG): 11.5 X10(3)/MCL
LYMPHOCYTES NFR BLD MANUAL: 0.69 X10(3)/MCL
LYMPHOCYTES NFR BLD MANUAL: 6 %
MCH RBC QN AUTO: 29.5 PG (ref 27–31)
MCHC RBC AUTO-ENTMCNC: 31 MG/DL (ref 33–36)
MCV RBC AUTO: 95.3 FL (ref 80–94)
MONOCYTES NFR BLD MANUAL: 0.34 X10(3)/MCL (ref 0.1–1.3)
MONOCYTES NFR BLD MANUAL: 3 %
NEUTROPHILS NFR BLD MANUAL: 91 %
NRBC BLD AUTO-RTO: 0 %
PLATELET # BLD AUTO: 216 X10(3)/MCL (ref 130–400)
PLATELET # BLD EST: ADEQUATE 10*3/UL
PMV BLD AUTO: 11.1 FL (ref 7.4–10.4)
POCT GLUCOSE: 105 MG/DL (ref 70–110)
POCT GLUCOSE: 87 MG/DL (ref 70–110)
POIKILOCYTOSIS BLD QL SMEAR: ABNORMAL
POTASSIUM SERPL-SCNC: 3.9 MMOL/L (ref 3.5–5.1)
RBC # BLD AUTO: 3.42 X10(6)/MCL (ref 4.7–6.1)
RBC MORPH BLD: ABNORMAL
SODIUM SERPL-SCNC: 137 MMOL/L (ref 136–145)
WBC # SPEC AUTO: 11.5 X10(3)/MCL (ref 4.5–11.5)

## 2022-09-07 PROCEDURE — 63600175 PHARM REV CODE 636 W HCPCS: Performed by: THORACIC SURGERY (CARDIOTHORACIC VASCULAR SURGERY)

## 2022-09-07 PROCEDURE — 97110 THERAPEUTIC EXERCISES: CPT

## 2022-09-07 PROCEDURE — 93010 ELECTROCARDIOGRAM REPORT: CPT | Mod: ,,, | Performed by: INTERNAL MEDICINE

## 2022-09-07 PROCEDURE — 25000003 PHARM REV CODE 250: Performed by: PHYSICIAN ASSISTANT

## 2022-09-07 PROCEDURE — 27000207 HC ISOLATION

## 2022-09-07 PROCEDURE — 99024 POSTOP FOLLOW-UP VISIT: CPT | Mod: POP,,, | Performed by: PHYSICIAN ASSISTANT

## 2022-09-07 PROCEDURE — 25000003 PHARM REV CODE 250: Performed by: INTERNAL MEDICINE

## 2022-09-07 PROCEDURE — 94761 N-INVAS EAR/PLS OXIMETRY MLT: CPT

## 2022-09-07 PROCEDURE — 94760 N-INVAS EAR/PLS OXIMETRY 1: CPT

## 2022-09-07 PROCEDURE — 99024 PR POST-OP FOLLOW-UP VISIT: ICD-10-PCS | Mod: POP,,, | Performed by: PHYSICIAN ASSISTANT

## 2022-09-07 PROCEDURE — 20000000 HC ICU ROOM

## 2022-09-07 PROCEDURE — 80048 BASIC METABOLIC PNL TOTAL CA: CPT | Performed by: PHYSICIAN ASSISTANT

## 2022-09-07 PROCEDURE — 85025 COMPLETE CBC W/AUTO DIFF WBC: CPT

## 2022-09-07 PROCEDURE — 93010 EKG 12-LEAD: ICD-10-PCS | Mod: ,,, | Performed by: INTERNAL MEDICINE

## 2022-09-07 PROCEDURE — 36415 COLL VENOUS BLD VENIPUNCTURE: CPT

## 2022-09-07 PROCEDURE — 93005 ELECTROCARDIOGRAM TRACING: CPT

## 2022-09-07 PROCEDURE — 27000221 HC OXYGEN, UP TO 24 HOURS

## 2022-09-07 RX ADMIN — MUPIROCIN: 20 OINTMENT TOPICAL at 09:09

## 2022-09-07 RX ADMIN — HYDROCODONE BITARTRATE AND ACETAMINOPHEN 1 TABLET: 5; 325 TABLET ORAL at 07:09

## 2022-09-07 RX ADMIN — FOLIC ACID 1 MG: 1 TABLET ORAL at 09:09

## 2022-09-07 RX ADMIN — SUCRALFATE 1 G: 1 TABLET ORAL at 04:09

## 2022-09-07 RX ADMIN — FAMOTIDINE 20 MG: 20 TABLET, FILM COATED ORAL at 08:09

## 2022-09-07 RX ADMIN — Medication 81 MG: at 09:09

## 2022-09-07 RX ADMIN — DOCUSATE SODIUM 100 MG: 100 CAPSULE, LIQUID FILLED ORAL at 09:09

## 2022-09-07 RX ADMIN — CLOPIDOGREL 75 MG: 75 TABLET, FILM COATED ORAL at 09:09

## 2022-09-07 RX ADMIN — ENOXAPARIN SODIUM 40 MG: 40 INJECTION SUBCUTANEOUS at 05:09

## 2022-09-07 RX ADMIN — HYDROCODONE BITARTRATE AND ACETAMINOPHEN 1 TABLET: 5; 325 TABLET ORAL at 01:09

## 2022-09-07 RX ADMIN — DOCUSATE SODIUM 100 MG: 100 CAPSULE, LIQUID FILLED ORAL at 08:09

## 2022-09-07 RX ADMIN — HYDROCODONE BITARTRATE AND ACETAMINOPHEN 1 TABLET: 5; 325 TABLET ORAL at 10:09

## 2022-09-07 RX ADMIN — SUCRALFATE 1 G: 1 TABLET ORAL at 10:09

## 2022-09-07 RX ADMIN — ATORVASTATIN CALCIUM 40 MG: 40 TABLET, FILM COATED ORAL at 08:09

## 2022-09-07 RX ADMIN — MUPIROCIN: 20 OINTMENT TOPICAL at 08:09

## 2022-09-07 RX ADMIN — FAMOTIDINE 20 MG: 20 TABLET, FILM COATED ORAL at 09:09

## 2022-09-07 RX ADMIN — SUCRALFATE 1 G: 1 TABLET ORAL at 08:09

## 2022-09-07 RX ADMIN — METOPROLOL TARTRATE 12.5 MG: 25 TABLET, FILM COATED ORAL at 08:09

## 2022-09-07 RX ADMIN — METOPROLOL TARTRATE 12.5 MG: 25 TABLET, FILM COATED ORAL at 09:09

## 2022-09-07 RX ADMIN — HYDROCODONE BITARTRATE AND ACETAMINOPHEN 1 TABLET: 5; 325 TABLET ORAL at 05:09

## 2022-09-07 NOTE — PROGRESS NOTES
09/07/22 0820   Pre Exercise Vitals   /69   Pulse 97   Supplemental O2? Yes   O2 Device nasal cannula   O2 Flow (L/min) 2   SpO2 96 %   During Exercise Vitals   Pulse 106   Supplemental O2? Yes   O2 Device nasal cannula   O2 Flow (L/min) 2   SpO2 90 %   Distance Walked 300   Post Exercise Vitals   /83   Pulse 102   Supplemental O2? Yes   O2 Device nasal cannula   O2 Flow (L/min) 2   SpO2 97 %   Modality   Modality Walker   Standby assist only. Sternal precautions maintained. Tolerated well. Encouraged incentive spirometer q 1 hour.

## 2022-09-07 NOTE — PROGRESS NOTES
[facility]  Hospital Medicine Progress Note        Chief Complaint: Inpatient Follow-up for NSTEMI    HPI: This is a 66-year-old male with medical history of former cigarette smoker quit 2016 present to [facility] ED with complaint of chest pain that started  today 8/30/2022 around 10:30 AM.  Described the pain as pressure-like substernal, nonradiating and associated with diaphoresis, pain lasted for about 1 hour and improved with sublingual nitro spray given with EMS and again in the emergency room.  On arrival he was noted to be hemodynamically stable and afebrile and saturating above 95% on room air. EKG  sinus rhythm and showed T-wave inversion lateral leads. Chest x-ray showed normal cardiac silhouette and no parenchymal lung opacities. Labs notable for troponin 1.078,  elevated transaminases and COVID-19 positive.  He was given nitro paste and currently is chest pain-free.  Cardiology consulted and transferred to [facility] and referred to hospital medicine service for further evaluation and management.    Interval Hx:   Sitting in chair, postop CABG day 2  Reported walked with the physical therapy this morning  Experiencing discomfort today, I requested nurse for pain medicine   Pt doing incentive spirometry 10 times/ hr and pulling 500cc  Chest tube in place   No family at bedside  Case discussed with patient's nurse in ICU    Objective/physical exam:  General: In no acute distress, afebrile  Chest:  Decreased entry bilaterally due to limitation from surgery.  Chest tube present  Heart: RRR, +S1, S2, no appreciable murmur.  Dressing over sternotomy  Abdomen: Soft, nontender, BS +  MSK: Warm, no lower extremity edema, no clubbing or cyanosis  Neurologic: Alert and oriented x4, Cranial nerve II-XII intact, Strength 5/5 in all 4 extremities    VITAL SIGNS: 24 HRS MIN & MAX LAST   Temp  Min: 97.9 °F (36.6 °C)  Max: 98.2 °F (36.8 °C) 98.2 °F (36.8 °C)   BP  Min: 102/70  Max: 139/75  121/77   Pulse  Min: 73  Max: 112  100   Resp  Min: 16  Max: 30 (!) 28   SpO2  Min: 89 %  Max: 100 % (!) 94 %       Recent Labs   Lab 09/05/22  1025 09/05/22  1544 09/06/22  0123 09/07/22  0134   WBC 13.8*  --  12.0* 11.5   RBC 3.30*  --  3.47* 3.42*   HGB 9.7* 9.3* 10.4* 10.1*   HCT 31.3* 29.6* 32.0* 32.6*   MCV 94.8*  --  92.2 95.3*   MCH 29.4  --  30.0 29.5   MCHC 31.0*  --  32.5* 31.0*   RDW 13.6  --  13.9 14.1     --  251 216   MPV 11.2*  --  11.0* 11.1*       Recent Labs   Lab 09/01/22  0300 09/02/22  0401 09/03/22  0422 09/05/22  0350 09/05/22  1025 09/05/22  1155 09/05/22  1443 09/05/22  1544 09/06/22  0123 09/07/22  0134    138 141 139 141  --   --   --  138 137   K 4.4 4.0 4.6 4.3 4.0  --   --  4.2 4.5 3.9   CO2 20* 21* 24 22* 19*  --   --   --  24 23   BUN 8.0* 11.9 13.4 13.9 11.1  --   --   --  9.0 8.7   CREATININE 0.71* 0.75 0.82 0.81 0.85  --   --   --  0.89 0.72*   CALCIUM 9.3 9.4 9.5 9.8 10.1*  --   --   --  8.9 9.0   PH  --   --   --   --   --  7.36 7.37  --   --   --    MG 2.30 2.20  --  2.30  --   --   --   --   --   --    ALBUMIN 3.8 3.9 3.8 3.9  --   --   --   --   --   --    ALKPHOS 49 48 50 55  --   --   --   --   --   --    ALT 77* 53 47 35  --   --   --   --   --   --    AST 47* 25 21 17  --   --   --   --   --   --    BILITOT 0.3 0.4 0.4 0.3  --   --   --   --   --   --           Microbiology Results (last 7 days)       Procedure Component Value Units Date/Time    Respiratory Culture [[id number]] Collected: 09/04/22 1047    Order Status: Completed Specimen: Nasopharyngeal Swab Updated: 09/06/22 0738     Respiratory Culture Normal respiratory joey    MRSA Screen by PCR [[id number]] Collected: 09/04/22 1047    Order Status: Canceled Specimen: Nasopharyngeal Swab from Nasal Updated: 09/04/22 105             See below for Radiology    Scheduled Med:   aspirin  81 mg Oral Daily    atorvastatin  40 mg Oral QHS    clopidogreL  75 mg Oral Daily    docusate sodium  100 mg Oral BID     enoxaparin  40 mg Subcutaneous Daily    famotidine  20 mg Oral BID    folic acid  1 mg Oral Daily    loperamide  4 mg Oral Once    metoprolol tartrate  12.5 mg Oral BID    mupirocin   Nasal BID    sucralfate  1 g Oral QID (AC & HS)        Continuous Infusions:         PRN Meds:  sodium chloride, sodium chloride, sodium chloride, acetaminophen, acetaminophen, acetaminophen, acetaminophen, albumin human 5%, aluminum-magnesium hydroxide-simethicone, benzonatate, ceFAZolin (ANCEF) IVPB, dextromethorphan-guaiFENesin  mg/5 ml, dextrose 10%, dextrose 10%, HYDROcodone-acetaminophen, iopamidoL, lactulose 10 gram/15 ml, LIDOcaine HCL 10 mg/ml (1%), magnesium sulfate IVPB, magnesium sulfate IVPB, melatonin, metoclopramide HCl, mupirocin, nitroGLYCERIN, nitroGLYCERIN, ondansetron, ondansetron, ondansetron, oxyCODONE, polyethylene glycol, prochlorperazine, senna-docusate 8.6-50 mg, simethicone, sodium chloride 0.9%, sodium chloride 0.9%, sodium phosphate IVPB, sodium phosphate IVPB, sodium phosphate IVPB, verapamiL       Assessment/Plan:  NSTEMI--> Multivessel disease-->  status post CABG 9/5  New acute systolic heart failure with EF 42% and grade 1 diastolic dysfunction  COVID 19 postive- no respiratory symptoms at this time  Hyperlipidemia  Transaminitis- resolved     Transthoracic ECHO - The estimated ejection fraction is 42%. Regional wall motion abnormalities of the LAD and RCA territories. Grade I left ventricular diastolic dysfunction.  8/31- Underwent C --> multivessel disease, consulted cardiothoracic surgery  Underwent CABG 09/05/2022 with Dr. Coleman  Postop day 2  Chest tube present, continue with chest tube care  Heparin drip discontinued post op  Cont Metoprolol succinate 12.5 mg daily, aspirin 81 mg daily  Liver enzyme normalized, resumed atorvastatin   Abdominal US--> hepatic steatosis   Hepatitis viral panel-nonreactive   HIV- nonreactive  UDS positive for THC   Maintain covid isolation and precautions  per CDC guideline  D-dimer was normal  CRP 2.19 and ferritin 69, both wnl  PT/OT consulted   Morning CBC, CMP stable      VTE prophylaxis:  Lovenox 40 mg subQ daily    Patient condition:  Stable    Anticipated discharge and Disposition:   TBD        All diagnosis and differential diagnosis have been reviewed; assessment and plan has been documented; I have personally reviewed the labs and test results that are presently available; I have reviewed the patients medication list; I have reviewed the consulting providers response and recommendations. I have reviewed or attempted to review medical records based upon their availability    All of the patient's questions have been  addressed and answered. Patient's is agreeable to the above stated plan. I will continue to monitor closely and make adjustments to medical management as needed.  _____________________________________________________________________    Nutrition Status:    Radiology:  X-Ray Chest AP Portable  Narrative: EXAMINATION:  XR CHEST AP PORTABLE    CLINICAL HISTORY:  Post-op;    TECHNIQUE:  One view    COMPARISON:  September 5, 2022.    FINDINGS:  Cardiopericardial silhouette enlarged appearance is similar.  Sternotomy changes.  Interval extubation and removal of the nasogastric tube.  Right IJ sheet and drainage catheters are in similar location.  There is minimal left pleural effusion and left basilar atelectasis.  No consolidation, pulmonary edema or pneumothorax.  Impression: Minimal left pleural effusion.    Electronically signed by: Jason Tsai  Date:    09/06/2022  Time:    06:35      Bahman Riojas MD   09/07/2022

## 2022-09-07 NOTE — PROGRESS NOTES
[facility]   Cardiology  Progress Note    Patient Name: Gustavo Cheung  MRN: [id number]  Admission Date: 8/30/2022  Hospital Length of Stay: 7 days  Code Status: Full Code   Attending Physician: Bahman Riojas MD   Primary Care Physician: No primary care provider on file.  Expected Discharge Date:   Principal Problem:Type 1 non-ST elevation myocardial infarction (NSTEMI)    Subjective:     Brief HPI:  Mr. Cheung is a 66 year old male who is unknown to [facility]. He presented to [facility] ED on 8.30.22 with complaints of chest pain that started yesterday. He describes the pain as pressure-like substernal, nonradiating with an association of diaphoresis. He reports that the pain lasted for about an hour and resolved with nitro SL provided by EMS. Upon arrival to the ED, his initial troponin was 1.078 and AST//104. He was also noted to be COVID positive. His UDS was positive for cannabis. His EKG demonstrated SR w/ T-wave inversion in lateral leads. He denies current CP, SOB, or palps. [facility] has been consulted to further evaluate the patient's CP and elevated troponin.     Hospital Course:  9.2.22: NAD. Denies CP, SOB, or palps. Heparin drip per protocol.   9.3.22: NAD. Denies CP, SOB, or palps. Continues on heparin drip per protocol.   9.4.22: NAD. Denies CP, SOB, or palps. Heparin drip continues. Potential plans for CABG tomorrow.  9.6.22: NAD. Denies SOB or palps. + incisional CP. VSS. Sitting up in chair.    9.7.22: NAD Noted. Vitals Stable. Intermittently tachycardic. BP Stable.    PMH: CAD, Pre-diabetes  PSH: hernia repair, right thigh & tendon repair surgery  Family History: Noncontributory  Social History: Ex smoker (quit in 2016). Reports occasional alcohol use (less than once/month). Denies drug use.      Previous Diagnostics:  CABG x 2 9.5.22:  LIMA - LAD, Reverse saphenous to PDA    [facility] 8.31.22  Left main-short, normal  Lad-99% prox, 80% Mid, Large diagonal occluded with circumflex  collaterals  LCX-50% mid, IFR not significant  RCA-dominant, 80% distal involving PDA and PLB  EDP 15 mmHg     Maximize medical management  Consult CT surgery for bypass to the LAD, diagonal, PLB, PDA   If patient is turned down for surgery consider staged intervention to LAD across the diagonal  Check out has been given the cardiology service and on-call Cardiology  CT surgery consult has been placed    Echo 8.31.22  The left ventricle is normal in size with concentric remodeling and mildly decreased systolic function.  The estimated ejection fraction is 42%.  Regional wall motion abnormalities of the LAD and RCA territories.  Grade I left ventricular diastolic dysfunction.  Normal right ventricular size with normal right ventricular systolic function.  The estimated PA systolic pressure is 14 mmHg    Review of Systems   Cardiovascular:  Negative for chest pain.   Respiratory:  Negative for shortness of breath.      Objective:     Vital Signs (Most Recent):  Temp: 98.2 °F (36.8 °C) (09/07/22 0400)  Pulse: 100 (09/07/22 0600)  Resp: (!) 28 (09/07/22 0600)  BP: 121/77 (09/07/22 0600)  SpO2: (!) 94 % (09/07/22 0600)   Vital Signs (24h Range):  Temp:  [97.9 °F (36.6 °C)-98.2 °F (36.8 °C)] 98.2 °F (36.8 °C)  Pulse:  [] 100  Resp:  [16-30] 28  SpO2:  [89 %-100 %] 94 %  BP: (102-139)/(62-84) 121/77     Weight: 75.6 kg (166 lb 10.7 oz)  Body mass index is 28.61 kg/m².    SpO2: (!) 94 %  O2 Device (Oxygen Therapy): nasal cannula      Intake/Output Summary (Last 24 hours) at 9/7/2022 0837  Last data filed at 9/7/2022 0552  Gross per 24 hour   Intake 1250 ml   Output 1845 ml   Net -595 ml         Lines/Drains/Airways       Drain  Duration                  Chest Tube 09/05/22 1008 Left Mediastinal 24 Fr. 1 day         Chest Tube 09/05/22 1008 Right Mediastinal 24 Fr. 1 day              Peripheral Intravenous Line  Duration                  Peripheral IV - Single Lumen 09/03/22 0230 20 G Left;Posterior Wrist 4 days                     Significant Labs:   Recent Results (from the past 72 hour(s))   Type & Screen    Collection Time: 09/04/22 10:02 AM   Result Value Ref Range    Group & Rh A POS     Indirect Selena GEL NEG    Prepare RBC 4 Units; surgery    Collection Time: 09/04/22 10:02 AM   Result Value Ref Range    UNIT NUMBER [id number]     UNIT ABO/RH A POS     DISPENSE STATUS Released     Unit Expiration [id number]     Product Code X6043I42     Unit Blood Type Code 6200     CROSSMATCH INTERPRETATION Compatible     UNIT NUMBER [id number]     UNIT ABO/RH A POS     DISPENSE STATUS Released     Unit Expiration [id number]     Product Code H1108B87     Unit Blood Type Code 6200     CROSSMATCH INTERPRETATION Compatible     UNIT NUMBER [id number]     UNIT ABO/RH A POS     DISPENSE STATUS Selected     Unit Expiration [id number]     Product Code E0536D00     Unit Blood Type Code 6200     CROSSMATCH INTERPRETATION Compatible     UNIT NUMBER [id number]     UNIT ABO/RH A POS     DISPENSE STATUS Selected     Unit Expiration [id number]     Product Code F1823O61     Unit Blood Type Code 6200     CROSSMATCH INTERPRETATION Compatible    COVID-19 Rapid Screening    Collection Time: 09/04/22 10:47 AM   Result Value Ref Range    SARS COV-2 MOLECULAR Negative Negative   Respiratory Culture    Collection Time: 09/04/22 10:47 AM    Specimen: Nasopharyngeal Swab   Result Value Ref Range    Respiratory Culture Normal respiratory joey    MRSA PCR    Collection Time: 09/04/22 10:47 AM   Result Value Ref Range    MRSA PCR SCRN ([facility]) Not Detected Not Detected   Urinalysis, Reflex to Urine Culture Urine, Clean Catch    Collection Time: 09/04/22  3:45 PM    Specimen: Urine   Result Value Ref Range    Color, UA Yellow Yellow, Colorless, Other, Clear    Appearance, UA Clear Clear    Specific [place], UA 1.011 1.001 - 1.030    pH, UA 8.0 5.0, 5.5, 6.0, 6.5, 7.0, 7.5, 8.0, 8.5    Protein, UA Negative Negative, 300  mg/dL    Glucose, UA  Negative Negative, Normal mg/dL    Ketones, UA Negative Negative, +1, +2, +3, +4, +5, >=160, >=80 mg/dL    Blood, UA Negative Negative unit/L    Bilirubin, UA Negative Negative mg/dL    Urobilinogen, UA 0.2 0.2, 1.0, Normal mg/dL    Nitrites, UA Negative Negative    Leukocyte Esterase, UA Negative Negative, 75  unit/L   Urinalysis, Microscopic    Collection Time: 09/04/22  3:45 PM   Result Value Ref Range    RBC, UA <5 <=5 /HPF    WBC, UA <5 <=5 /HPF    Squamous Epithelial Cells, UA <5 <=5 /HPF    Bacteria, UA None Seen None Seen, Rare, Occasional /HPF   Comprehensive Metabolic Panel    Collection Time: 09/05/22  3:50 AM   Result Value Ref Range    Sodium Level 139 136 - 145 mmol/L    Potassium Level 4.3 3.5 - 5.1 mmol/L    Chloride 107 98 - 107 mmol/L    Carbon Dioxide 22 (L) 23 - 31 mmol/L    Glucose Level 97 82 - 115 mg/dL    Blood Urea Nitrogen 13.9 8.4 - 25.7 mg/dL    Creatinine 0.81 0.73 - 1.18 mg/dL    Calcium Level Total 9.8 8.8 - 10.0 mg/dL    Protein Total 7.0 5.8 - 7.6 gm/dL    Albumin Level 3.9 3.4 - 4.8 gm/dL    Globulin 3.1 2.4 - 3.5 gm/dL    Albumin/Globulin Ratio 1.3 1.1 - 2.0 ratio    Bilirubin Total 0.3 <=1.5 mg/dL    Alkaline Phosphatase 55 40 - 150 unit/L    Alanine Aminotransferase 35 0 - 55 unit/L    Aspartate Aminotransferase 17 5 - 34 unit/L    eGFR >60 mls/min/1.73/m2   Magnesium    Collection Time: 09/05/22  3:50 AM   Result Value Ref Range    Magnesium Level 2.30 1.60 - 2.60 mg/dL   Protime-INR    Collection Time: 09/05/22  3:50 AM   Result Value Ref Range    PT 13.0 12.5 - 14.5 seconds    INR 0.99 0.00 - 1.30   Type & Screen    Collection Time: 09/05/22  3:50 AM   Result Value Ref Range    Group & Rh A POS     Indirect Selena GEL NEG    CBC with Differential    Collection Time: 09/05/22  3:51 AM   Result Value Ref Range    WBC 6.9 4.5 - 11.5 x10(3)/mcL    RBC 4.49 (L) 4.70 - 6.10 x10(6)/mcL    Hgb 13.3 (L) 14.0 - 18.0 gm/dL    Hct 41.6 (L) 42.0 - 52.0 %    MCV 92.7 80.0 - 94.0 fL    MCH  29.6 27.0 - 31.0 pg    MCHC 32.0 (L) 33.0 - 36.0 mg/dL    RDW 13.7 11.5 - 17.0 %    Platelet 332 130 - 400 x10(3)/mcL    MPV 10.8 (H) 7.4 - 10.4 fL    Neut % 58.9 %    Lymph % 25.5 %    Mono % 11.2 %    Eos % 3.3 %    Basophil % 0.7 %    Lymph # 1.77 0.6 - 4.6 x10(3)/mcL    Neut # 4.1 2.1 - 9.2 x10(3)/mcL    Mono # 0.78 0.1 - 1.3 x10(3)/mcL    Eos # 0.23 0 - 0.9 x10(3)/mcL    Baso # 0.05 0 - 0.2 x10(3)/mcL    IG# 0.03 0 - 0.04 x10(3)/mcL    IG% 0.4 %    NRBC% 0.0 %   POCT glucose    Collection Time: 09/05/22  4:52 AM   Result Value Ref Range    POCT Glucose 94 70 - 110 mg/dL   APTT    Collection Time: 09/05/22 10:25 AM   Result Value Ref Range    PTT 27.9 23.2 - 33.7 seconds   Basic Metabolic Panel    Collection Time: 09/05/22 10:25 AM   Result Value Ref Range    Sodium Level 141 136 - 145 mmol/L    Potassium Level 4.0 3.5 - 5.1 mmol/L    Chloride 115 (H) 98 - 107 mmol/L    Carbon Dioxide 19 (L) 23 - 31 mmol/L    Glucose Level 158 (H) 82 - 115 mg/dL    Blood Urea Nitrogen 11.1 8.4 - 25.7 mg/dL    Creatinine 0.85 0.73 - 1.18 mg/dL    BUN/Creatinine Ratio 13     Calcium Level Total 10.1 (H) 8.8 - 10.0 mg/dL    Anion Gap 7.0 mEq/L    eGFR >60 mls/min/1.73/m2   Protime-INR    Collection Time: 09/05/22 10:25 AM   Result Value Ref Range    PT 17.6 (H) 12.5 - 14.5 seconds    INR 1.47 (H) 0.00 - 1.30   CBC with Differential    Collection Time: 09/05/22 10:25 AM   Result Value Ref Range    WBC 13.8 (H) 4.5 - 11.5 x10(3)/mcL    RBC 3.30 (L) 4.70 - 6.10 x10(6)/mcL    Hgb 9.7 (L) 14.0 - 18.0 gm/dL    Hct 31.3 (L) 42.0 - 52.0 %    MCV 94.8 (H) 80.0 - 94.0 fL    MCH 29.4 27.0 - 31.0 pg    MCHC 31.0 (L) 33.0 - 36.0 mg/dL    RDW 13.6 11.5 - 17.0 %    Platelet 221 130 - 400 x10(3)/mcL    MPV 11.2 (H) 7.4 - 10.4 fL    Neut % 73.3 %    Lymph % 16.9 %    Mono % 6.2 %    Eos % 2.2 %    Basophil % 0.4 %    Lymph # 2.33 0.6 - 4.6 x10(3)/mcL    Neut # 10.1 (H) 2.1 - 9.2 x10(3)/mcL    Mono # 0.86 0.1 - 1.3 x10(3)/mcL    Eos # 0.30 0 - 0.9  x10(3)/mcL    Baso # 0.06 0 - 0.2 x10(3)/mcL    IG# 0.14 (H) 0 - 0.04 x10(3)/mcL    IG% 1.0 %    NRBC% 0.0 %   POCT glucose    Collection Time: 09/05/22 11:26 AM   Result Value Ref Range    POCT Glucose 170 (H) 70 - 110 mg/dL   POCT ARTERIAL BLOOD GAS    Collection Time: 09/05/22 11:55 AM   Result Value Ref Range    POC PH 7.36     POC PCO2 37 mmHg    POC PO2 84 mmHg    POC SATURATED O2 96 %    POC Potassium 3.7 mmol/l    POC Sodium 140 mmol/l    POC Ionized Calcium 1.54 (AA) 0.790 - 1.40 mmol/l    POC HCO3 20.9 mmol/l    Base Deficit -4.1 mmol/l    POC Temp 37.0 C    Specimen source Arterial sample    POCT glucose    Collection Time: 09/05/22 11:56 AM   Result Value Ref Range    POCT Glucose 157 (H) 70 - 110 mg/dL   POCT glucose    Collection Time: 09/05/22 12:59 PM   Result Value Ref Range    POCT Glucose 126 (H) 70 - 110 mg/dL   POCT glucose    Collection Time: 09/05/22  1:52 PM   Result Value Ref Range    POCT Glucose 113 (H) 70 - 110 mg/dL   POCT ARTERIAL BLOOD GAS    Collection Time: 09/05/22  2:43 PM   Result Value Ref Range    POC PH 7.37     POC PCO2 40 mmHg    POC PO2 106 (A) mmHg    POC SATURATED O2 98 %    POC Potassium 3.6 mmol/l    POC Sodium 140 mmol/l    POC Ionized Calcium 1.26 (A) 1.12 - 1.23 mmol/l    POC HCO3 23.1 mmol/l    Base Deficit -2.0 mmol/l    POC Temp 37.0 C    Specimen source Arterial sample    POCT glucose    Collection Time: 09/05/22  2:55 PM   Result Value Ref Range    POCT Glucose 122 (H) 70 - 110 mg/dL   Potassium    Collection Time: 09/05/22  3:44 PM   Result Value Ref Range    Potassium Level 4.2 3.5 - 5.1 mmol/L   Hemoglobin and Hematocrit    Collection Time: 09/05/22  3:44 PM   Result Value Ref Range    Hgb 9.3 (L) 14.0 - 18.0 gm/dL    Hct 29.6 (L) 42.0 - 52.0 %   POCT glucose    Collection Time: 09/05/22  3:53 PM   Result Value Ref Range    POCT Glucose 118 (H) 70 - 110 mg/dL   POCT glucose    Collection Time: 09/05/22  5:00 PM   Result Value Ref Range    POCT Glucose 122 (H)  70 - 110 mg/dL   POCT glucose    Collection Time: 09/05/22  5:49 PM   Result Value Ref Range    POCT Glucose 125 (H) 70 - 110 mg/dL   POCT glucose    Collection Time: 09/05/22  8:07 PM   Result Value Ref Range    POCT Glucose 98 70 - 110 mg/dL   POCT glucose    Collection Time: 09/05/22  9:13 PM   Result Value Ref Range    POCT Glucose 127 (H) 70 - 110 mg/dL   POCT glucose    Collection Time: 09/05/22 10:04 PM   Result Value Ref Range    POCT Glucose 125 (H) 70 - 110 mg/dL   POCT glucose    Collection Time: 09/05/22 11:05 PM   Result Value Ref Range    POCT Glucose 110 70 - 110 mg/dL   POCT glucose    Collection Time: 09/06/22 12:04 AM   Result Value Ref Range    POCT Glucose 108 70 - 110 mg/dL   POCT glucose    Collection Time: 09/06/22  1:16 AM   Result Value Ref Range    POCT Glucose 119 (H) 70 - 110 mg/dL   Basic metabolic panel    Collection Time: 09/06/22  1:23 AM   Result Value Ref Range    Sodium Level 138 136 - 145 mmol/L    Potassium Level 4.5 3.5 - 5.1 mmol/L    Chloride 107 98 - 107 mmol/L    Carbon Dioxide 24 23 - 31 mmol/L    Glucose Level 122 (H) 82 - 115 mg/dL    Blood Urea Nitrogen 9.0 8.4 - 25.7 mg/dL    Creatinine 0.89 0.73 - 1.18 mg/dL    BUN/Creatinine Ratio 10     Calcium Level Total 8.9 8.8 - 10.0 mg/dL    Anion Gap 7.0 mEq/L    eGFR >60 mls/min/1.73/m2   CBC with Differential    Collection Time: 09/06/22  1:23 AM   Result Value Ref Range    WBC 12.0 (H) 4.5 - 11.5 x10(3)/mcL    RBC 3.47 (L) 4.70 - 6.10 x10(6)/mcL    Hgb 10.4 (L) 14.0 - 18.0 gm/dL    Hct 32.0 (L) 42.0 - 52.0 %    MCV 92.2 80.0 - 94.0 fL    MCH 30.0 27.0 - 31.0 pg    MCHC 32.5 (L) 33.0 - 36.0 mg/dL    RDW 13.9 11.5 - 17.0 %    Platelet 251 130 - 400 x10(3)/mcL    MPV 11.0 (H) 7.4 - 10.4 fL    Neut % 81.2 %    Lymph % 6.7 %    Mono % 11.3 %    Eos % 0.0 %    Basophil % 0.2 %    Lymph # 0.81 0.6 - 4.6 x10(3)/mcL    Neut # 9.8 (H) 2.1 - 9.2 x10(3)/mcL    Mono # 1.36 (H) 0.1 - 1.3 x10(3)/mcL    Eos # 0.00 0 - 0.9 x10(3)/mcL    Baso  # 0.02 0 - 0.2 x10(3)/mcL    IG# 0.07 (H) 0 - 0.04 x10(3)/mcL    IG% 0.6 %    NRBC% 0.0 %   POCT glucose    Collection Time: 09/06/22  2:07 AM   Result Value Ref Range    POCT Glucose 121 (H) 70 - 110 mg/dL   POCT glucose    Collection Time: 09/06/22  3:06 AM   Result Value Ref Range    POCT Glucose 109 70 - 110 mg/dL   POCT glucose    Collection Time: 09/06/22  4:12 AM   Result Value Ref Range    POCT Glucose 109 70 - 110 mg/dL   POCT glucose    Collection Time: 09/06/22  6:15 AM   Result Value Ref Range    POCT Glucose 117 (H) 70 - 110 mg/dL   POCT glucose    Collection Time: 09/06/22  8:20 AM   Result Value Ref Range    POCT Glucose 83 70 - 110 mg/dL   Transesophageal echo (VARUN)    Collection Time: 09/06/22  9:52 AM   Result Value Ref Range    BSA 1.75 m2   POCT glucose    Collection Time: 09/06/22  5:10 PM   Result Value Ref Range    POCT Glucose 118 (H) 70 - 110 mg/dL   POCT glucose    Collection Time: 09/06/22 11:21 PM   Result Value Ref Range    POCT Glucose 107 70 - 110 mg/dL   Basic metabolic panel    Collection Time: 09/07/22  1:34 AM   Result Value Ref Range    Sodium Level 137 136 - 145 mmol/L    Potassium Level 3.9 3.5 - 5.1 mmol/L    Chloride 108 (H) 98 - 107 mmol/L    Carbon Dioxide 23 23 - 31 mmol/L    Glucose Level 116 (H) 82 - 115 mg/dL    Blood Urea Nitrogen 8.7 8.4 - 25.7 mg/dL    Creatinine 0.72 (L) 0.73 - 1.18 mg/dL    BUN/Creatinine Ratio 12     Calcium Level Total 9.0 8.8 - 10.0 mg/dL    Anion Gap 6.0 mEq/L    eGFR >60 mls/min/1.73/m2   CBC with Differential    Collection Time: 09/07/22  1:34 AM   Result Value Ref Range    WBC 11.5 4.5 - 11.5 x10(3)/mcL    RBC 3.42 (L) 4.70 - 6.10 x10(6)/mcL    Hgb 10.1 (L) 14.0 - 18.0 gm/dL    Hct 32.6 (L) 42.0 - 52.0 %    MCV 95.3 (H) 80.0 - 94.0 fL    MCH 29.5 27.0 - 31.0 pg    MCHC 31.0 (L) 33.0 - 36.0 mg/dL    RDW 14.1 11.5 - 17.0 %    Platelet 216 130 - 400 x10(3)/mcL    MPV 11.1 (H) 7.4 - 10.4 fL    IG# 0.07 (H) 0 - 0.04 x10(3)/mcL    IG% 0.6 %     NRBC% 0.0 %   Manual Differential    Collection Time: 09/07/22  1:34 AM   Result Value Ref Range    Neut Man 91 %    Lymph Man 6 %    Monocyte Man 3 %    Instr WBC 11.5 x10(3)/mcL    Abs Mono 0.345 0.1 - 1.3 x10(3)/mcL    Abs Lymp 0.69 0.6 - 4.6 x10(3)/mcL    Abs Neut 10.465 (H) 2.1 - 9.2 x10(3)/mcL    RBC Morph Abnormal (A) Normal    Poik 1+ (A) (none)    [place] Cells 1+ (A) (none)    Platelet Est Adequate Normal, Adequate       Telemetry: Sinus Rhythm    Physical Exam  Vitals reviewed.   Constitutional:       Appearance: Normal appearance.   HENT:      Head: Normocephalic.      Mouth/Throat:      Pharynx: Oropharynx is clear.   Cardiovascular:      Rate and Rhythm: Normal rate and regular rhythm.      Comments: Sinus Rhythm 80's  Pulmonary:      Effort: Pulmonary effort is normal. No respiratory distress.      Breath sounds: Normal breath sounds.   Abdominal:      General: There is no distension.      Palpations: Abdomen is soft.      Tenderness: There is no abdominal tenderness.   Musculoskeletal:         General: Normal range of motion.      Cervical back: Neck supple.      Comments: Chest Tubes in Place.   Skin:     General: Skin is warm and dry.      Capillary Refill: Capillary refill takes less than 2 seconds.   Neurological:      General: No focal deficit present.      Mental Status: He is alert and oriented to person, place, and time. Mental status is at baseline.   Psychiatric:         Behavior: Behavior normal.       Current Inpatient Medications:    Current Facility-Administered Medications:     0.9%  NaCl infusion (for blood administration), , Intravenous, Q24H PRN, ITA Jerez    0.9%  NaCl infusion (for blood administration), , Intravenous, Q24H PRN, ITA Jerez    0.9%  NaCl infusion (for blood administration), , Intravenous, Q24H PRN, Adolfo Coleman MD    acetaminophen oral solution 650 mg, 650 mg, Per OG tube, Q6H PRN, Ross Gutierrez PA-C    acetaminophen tablet 1,000 mg, 1,000 mg, Oral, Q6H  PRN, Yomi Milligan MD, 1,000 mg at 08/31/22 1934    acetaminophen tablet 650 mg, 650 mg, Oral, Q4H PRN, Yomi Milligan MD    acetaminophen tablet 650 mg, 650 mg, Oral, Q4H PRN, Luis Daniel Maza MD, 650 mg at 09/03/22 2042    albumin human 5% bottle 12.5 g, 12.5 g, Intravenous, PRN, Ross Gutierrez PA-C, Stopped at 09/05/22 1621    aluminum-magnesium hydroxide-simethicone 200-200-20 mg/5 mL suspension 30 mL, 30 mL, Oral, QID PRN, Yomi Milligan MD    aspirin chewable tablet 81 mg, 81 mg, Oral, Daily, Yomi Milligan MD, 81 mg at 09/06/22 0735    atorvastatin tablet 40 mg, 40 mg, Oral, QHS, Bahman Riojas MD, 40 mg at 09/06/22 2031    benzonatate capsule 200 mg, 200 mg, Oral, TID PRN, Yomi Milligan MD    cefazolin (ANCEF) 2 gram in dextrose 5% 50 mL IVPB (premix), 2 g, Intravenous, On Call Procedure, ITA Jerez    clopidogreL tablet 75 mg, 75 mg, Oral, Daily, Ross Gutierrez PA-C, 75 mg at 09/06/22 0807    dextromethorphan-guaiFENesin  mg/5 ml liquid 5 mL, 5 mL, Oral, Q4H PRN, Yomi Milligan MD    dextrose 10% bolus 125 mL, 12.5 g, Intravenous, PRN, Ross Gutierrez PA-C    dextrose 10% bolus 250 mL, 25 g, Intravenous, PRN, Ross Gutierrez PA-C    docusate sodium capsule 100 mg, 100 mg, Oral, BID, Ross Gutierrez PA-C, 100 mg at 09/06/22 2031    enoxaparin injection 40 mg, 40 mg, Subcutaneous, Daily, Adolfo Coleman MD, 40 mg at 09/06/22 1805    famotidine tablet 20 mg, 20 mg, Oral, BID, Yomi Milligan MD, 20 mg at 09/06/22 2030    folic acid tablet 1 mg, 1 mg, Oral, Daily, Ross Gutierrez PA-C, 1 mg at 09/06/22 0807    HYDROcodone-acetaminophen 5-325 mg per tablet 1 tablet, 1 tablet, Oral, Q4H PRN, Ross Gutierrez PA-C, 1 tablet at 09/07/22 0142    iopamidoL (ISOVUE-370) injection, , , PRN, Luis Daniel Maza MD, 100 mL at 08/31/22 1635    lactulose 10 gram/15 ml solution 20 g, 20 g, Oral, Q6H PRN, Ross Gutierrez PA-C    LIDOcaine HCL 10 mg/ml (1%) injection, , , PRN, Luis Daniel Maza MD, 10 mL at 08/31/22 1546     loperamide capsule 4 mg, 4 mg, Oral, Once, Ross Gutierrez PA-C    magnesium sulfate 2g in water 50mL IVPB (premix), 2 g, Intravenous, PRN, Ross Gutierrez PA-C    magnesium sulfate 2g in water 50mL IVPB (premix), 4 g, Intravenous, PRN, Ross Gutierrez PA-C    melatonin tablet 6 mg, 6 mg, Oral, Nightly PRN, Yomi Milligan MD    metoclopramide HCl injection 5 mg, 5 mg, Intravenous, Q6H PRN, Ross Gutierrez PA-C    metoprolol tartrate (LOPRESSOR) split tablet 12.5 mg, 12.5 mg, Oral, BID, Ross Gutierrez PA-C, 12.5 mg at 09/06/22 2030    mupirocin 2 % ointment, , Nasal, On Call Procedure, ITA Jerez    mupirocin 2 % ointment, , Nasal, BID, Ross Gutierrez PA-C, Given at 09/06/22 2031    nitroGLYCERIN injection, , , PRN, Luis Daniel Maza MD, 200 mcg at 08/31/22 1603    nitroGLYCERIN SL tablet 0.4 mg, 0.4 mg, Sublingual, Q5 Min PRN, Yomi Milligan MD    ondansetron disintegrating tablet 8 mg, 8 mg, Oral, Q8H PRN, Luis Daniel Maza MD    ondansetron injection 4 mg, 4 mg, Intravenous, Q4H PRN, Yomi Milligan MD, 4 mg at 09/05/22 2122    ondansetron injection 4 mg, 4 mg, Intravenous, Q12H PRN, Ross Gutierrez PA-C    oxyCODONE immediate release tablet 5 mg, 5 mg, Oral, Q4H PRN, Ross Gutierrez PA-C, 5 mg at 09/06/22 2135    polyethylene glycol packet 17 g, 17 g, Oral, BID PRN, Yomi Milligan MD    prochlorperazine injection Soln 5 mg, 5 mg, Intravenous, Q6H PRN, Yomi Milligan MD    senna-docusate 8.6-50 mg per tablet 1 tablet, 1 tablet, Oral, BID PRN, Yomi Milligan MD    simethicone chewable tablet 80 mg, 1 tablet, Oral, QID PRN, Yomi Milligan MD    sodium chloride 0.9% flush 10 mL, 10 mL, Intravenous, PRN, Yomi Milligan MD    sodium chloride 0.9% flush 10 mL, 10 mL, Intravenous, PRN, Mouna Rm, SHELBY    sodium phosphate 15 mmol in dextrose 5 % 250 mL IVPB, 15 mmol, Intravenous, PRN, ITA Liang-FITO    sodium phosphate 20.01 mmol in dextrose 5 % 250 mL IVPB, 20.01 mmol, Intravenous, PRN, Ross Gutierrez,  PAVEL    sodium phosphate 30 mmol in dextrose 5 % 250 mL IVPB, 30 mmol, Intravenous, PRN, Ross Gutierrez PA-C    sucralfate tablet 1 g, 1 g, Oral, QID (AC & HS), Ross Gutierrez PA-C, 1 g at 09/06/22 2031    verapamiL injection, , , PRN, Luis Daniel Maza MD, 2.5 mg at 08/31/22 1548    VTE Risk Mitigation (From admission, onward)           Ordered     enoxaparin injection 40 mg  Daily         09/06/22 1722     IP VTE LOW RISK PATIENT  Once         08/30/22 2316     Place sequential compression device  Until discontinued         08/30/22 2316                    Assessment:   CAD (Multi Vessel)    - Left main-normal; LAD-99% prox, mid, large diagonal occluded with circumflex collaterals; Left CX-50% mid, IFR not significant; RCA-dominant, 80% distal involving PDA and PLB; EDP 15 mm HG.    - CABG x 2 (9.5.22): LIMA - LAD, Reverse saphenous to PDA  NSTEMI -Type 1  Acute Systolic/Diastolic Heart Failure (Compensated)    - EF is 42%    - Grade I DD    - regional wall motion abnormalities of the LAD and RCA territories  Recent COVID 19 Infection (Resolved, Now Negative)  Transaminitis   Pre DM  Anemia (Stable)  No Hx of GIB      Plan:   Continue Asa/BB/Statin/Plavix  Chest tube management per CVSx.  Incentive spirometer hourly and aggressive mobilization.  Consider ACE once BP Stablizes.     Kallie Mendez, SHELBY  Cardiology  [facility]   09/07/2022

## 2022-09-08 LAB
ABO + RH BLD: NORMAL
ALBUMIN SERPL-MCNC: 2.8 GM/DL (ref 3.4–4.8)
ALBUMIN/GLOB SERPL: 0.9 RATIO (ref 1.1–2)
ALP SERPL-CCNC: 54 UNIT/L (ref 40–150)
ALT SERPL-CCNC: 16 UNIT/L (ref 0–55)
AST SERPL-CCNC: 17 UNIT/L (ref 5–34)
BILIRUBIN DIRECT+TOT PNL SERPL-MCNC: 0.6 MG/DL
BLD PROD TYP BPU: NORMAL
BLOOD UNIT EXPIRATION DATE: NORMAL
BLOOD UNIT TYPE CODE: 6200
BUN SERPL-MCNC: 11.5 MG/DL (ref 8.4–25.7)
CALCIUM SERPL-MCNC: 9.1 MG/DL (ref 8.8–10)
CHLORIDE SERPL-SCNC: 108 MMOL/L (ref 98–107)
CO2 SERPL-SCNC: 21 MMOL/L (ref 23–31)
CREAT SERPL-MCNC: 0.73 MG/DL (ref 0.73–1.18)
CROSSMATCH INTERPRETATION: NORMAL
DISPENSE STATUS: NORMAL
GFR SERPLBLD CREATININE-BSD FMLA CKD-EPI: >60 MLS/MIN/1.73/M2
GLOBULIN SER-MCNC: 3 GM/DL (ref 2.4–3.5)
GLUCOSE SERPL-MCNC: 94 MG/DL (ref 82–115)
MAGNESIUM SERPL-MCNC: 1.9 MG/DL (ref 1.6–2.6)
POCT GLUCOSE: 126 MG/DL (ref 70–110)
POCT GLUCOSE: 86 MG/DL (ref 70–110)
POTASSIUM SERPL-SCNC: 3.6 MMOL/L (ref 3.5–5.1)
PROT SERPL-MCNC: 5.8 GM/DL (ref 5.8–7.6)
SODIUM SERPL-SCNC: 139 MMOL/L (ref 136–145)
UNIT NUMBER: NORMAL

## 2022-09-08 PROCEDURE — 97162 PT EVAL MOD COMPLEX 30 MIN: CPT

## 2022-09-08 PROCEDURE — 25000003 PHARM REV CODE 250: Performed by: NURSE PRACTITIONER

## 2022-09-08 PROCEDURE — 25000003 PHARM REV CODE 250: Performed by: PHYSICIAN ASSISTANT

## 2022-09-08 PROCEDURE — 99900035 HC TECH TIME PER 15 MIN (STAT)

## 2022-09-08 PROCEDURE — 21400001 HC TELEMETRY ROOM

## 2022-09-08 PROCEDURE — 63600175 PHARM REV CODE 636 W HCPCS: Performed by: THORACIC SURGERY (CARDIOTHORACIC VASCULAR SURGERY)

## 2022-09-08 PROCEDURE — 80053 COMPREHEN METABOLIC PANEL: CPT | Performed by: NURSE PRACTITIONER

## 2022-09-08 PROCEDURE — 63600175 PHARM REV CODE 636 W HCPCS: Performed by: NURSE PRACTITIONER

## 2022-09-08 PROCEDURE — 83735 ASSAY OF MAGNESIUM: CPT | Performed by: NURSE PRACTITIONER

## 2022-09-08 PROCEDURE — 94760 N-INVAS EAR/PLS OXIMETRY 1: CPT

## 2022-09-08 PROCEDURE — 97166 OT EVAL MOD COMPLEX 45 MIN: CPT

## 2022-09-08 PROCEDURE — 97110 THERAPEUTIC EXERCISES: CPT

## 2022-09-08 PROCEDURE — 25000003 PHARM REV CODE 250: Performed by: INTERNAL MEDICINE

## 2022-09-08 PROCEDURE — 36415 COLL VENOUS BLD VENIPUNCTURE: CPT | Performed by: NURSE PRACTITIONER

## 2022-09-08 RX ORDER — ASPIRIN 81 MG/1
81 TABLET ORAL DAILY
Status: DISCONTINUED | OUTPATIENT
Start: 2022-09-09 | End: 2022-09-10 | Stop reason: HOSPADM

## 2022-09-08 RX ORDER — METOPROLOL TARTRATE 25 MG/1
12.5 TABLET ORAL ONCE
Status: COMPLETED | OUTPATIENT
Start: 2022-09-08 | End: 2022-09-08

## 2022-09-08 RX ORDER — POTASSIUM CHLORIDE 20 MEQ/1
40 TABLET, EXTENDED RELEASE ORAL ONCE
Status: COMPLETED | OUTPATIENT
Start: 2022-09-08 | End: 2022-09-08

## 2022-09-08 RX ORDER — LISINOPRIL 5 MG/1
5 TABLET ORAL DAILY
Status: DISCONTINUED | OUTPATIENT
Start: 2022-09-08 | End: 2022-09-09

## 2022-09-08 RX ORDER — METOPROLOL TARTRATE 25 MG/1
25 TABLET, FILM COATED ORAL 2 TIMES DAILY
Status: DISCONTINUED | OUTPATIENT
Start: 2022-09-08 | End: 2022-09-09

## 2022-09-08 RX ORDER — MAGNESIUM SULFATE HEPTAHYDRATE 40 MG/ML
2 INJECTION, SOLUTION INTRAVENOUS ONCE
Status: COMPLETED | OUTPATIENT
Start: 2022-09-08 | End: 2022-09-08

## 2022-09-08 RX ADMIN — SUCRALFATE 1 G: 1 TABLET ORAL at 09:09

## 2022-09-08 RX ADMIN — MAGNESIUM SULFATE HEPTAHYDRATE 2 G: 40 INJECTION, SOLUTION INTRAVENOUS at 12:09

## 2022-09-08 RX ADMIN — DOCUSATE SODIUM 100 MG: 100 CAPSULE, LIQUID FILLED ORAL at 08:09

## 2022-09-08 RX ADMIN — METOPROLOL TARTRATE 12.5 MG: 25 TABLET, FILM COATED ORAL at 08:09

## 2022-09-08 RX ADMIN — DOCUSATE SODIUM 100 MG: 100 CAPSULE, LIQUID FILLED ORAL at 09:09

## 2022-09-08 RX ADMIN — Medication 81 MG: at 08:09

## 2022-09-08 RX ADMIN — SUCRALFATE 1 G: 1 TABLET ORAL at 07:09

## 2022-09-08 RX ADMIN — SUCRALFATE 1 G: 1 TABLET ORAL at 12:09

## 2022-09-08 RX ADMIN — LISINOPRIL 5 MG: 5 TABLET ORAL at 12:09

## 2022-09-08 RX ADMIN — MUPIROCIN: 20 OINTMENT TOPICAL at 09:09

## 2022-09-08 RX ADMIN — METOPROLOL TARTRATE 25 MG: 25 TABLET, FILM COATED ORAL at 09:09

## 2022-09-08 RX ADMIN — ENOXAPARIN SODIUM 40 MG: 40 INJECTION SUBCUTANEOUS at 04:09

## 2022-09-08 RX ADMIN — CLOPIDOGREL 75 MG: 75 TABLET, FILM COATED ORAL at 08:09

## 2022-09-08 RX ADMIN — FAMOTIDINE 20 MG: 20 TABLET, FILM COATED ORAL at 09:09

## 2022-09-08 RX ADMIN — METOPROLOL TARTRATE 12.5 MG: 25 TABLET, FILM COATED ORAL at 12:09

## 2022-09-08 RX ADMIN — ATORVASTATIN CALCIUM 40 MG: 40 TABLET, FILM COATED ORAL at 09:09

## 2022-09-08 RX ADMIN — HYDROCODONE BITARTRATE AND ACETAMINOPHEN 1 TABLET: 5; 325 TABLET ORAL at 02:09

## 2022-09-08 RX ADMIN — MUPIROCIN: 20 OINTMENT TOPICAL at 08:09

## 2022-09-08 RX ADMIN — HYDROCODONE BITARTRATE AND ACETAMINOPHEN 1 TABLET: 5; 325 TABLET ORAL at 01:09

## 2022-09-08 RX ADMIN — POTASSIUM CHLORIDE 40 MEQ: 1500 TABLET, EXTENDED RELEASE ORAL at 12:09

## 2022-09-08 RX ADMIN — SUCRALFATE 1 G: 1 TABLET ORAL at 04:09

## 2022-09-08 RX ADMIN — HYDROCODONE BITARTRATE AND ACETAMINOPHEN 1 TABLET: 5; 325 TABLET ORAL at 08:09

## 2022-09-08 RX ADMIN — FOLIC ACID 1 MG: 1 TABLET ORAL at 08:09

## 2022-09-08 RX ADMIN — FAMOTIDINE 20 MG: 20 TABLET, FILM COATED ORAL at 08:09

## 2022-09-08 NOTE — PLAN OF CARE
Problem: Occupational Therapy  Goal: Occupational Therapy Goal  Description: Goals to be met by: 9/22/22    Patient will increase functional independence with ADLs by performing:    LE Dressing with Modified [place].  Grooming while standing at sink with Modified [place].  Toileting from toilet with Modified [place] for hygiene and clothing management.     Outcome: Ongoing, Progressing

## 2022-09-08 NOTE — PLAN OF CARE
Pt post CABG moved to floor this am. CT in place. Pt ambulating 300 ft. I met with pt and discussed post op plan. Educated on rehab,snf and hh. Pt lives alone and has limited resources. He plans on calling close friends to assist  him at dc.HH discussion and foc for Germanjennifer Cotter. Ref sent through [facility] and Sarah notified.

## 2022-09-08 NOTE — PT/OT/SLP EVAL
Physical Therapy Evaluation    Patient Name:  Gustavo Cheung   MRN:  [id number]    Recommendations:     Discharge Recommendations:  rehabilitation facility (versus home with HHPT pending further progress)   Discharge Equipment Recommendations: none   Barriers to discharge: Decreased caregiver support and lives alone. 3 steps to enter w no handrail    Assessment:     Gustavo Cheung is a 67 y.o. male admitted with a medical diagnosis of Type 1 non-ST elevation myocardial infarction (NSTEMI), s/p CABG, new systolic HR.  He presents with the following impairments/functional limitations:  weakness, impaired endurance, impaired self care skills, impaired functional mobility, gait instability, impaired balance, pain. At baseline, pt lives alone and is independent with mobility. No hx of falls. Currently, the pt demonstrates generalized weakness and endurance deficits. Recommending Rehab placement versus home health PT pending progress. Pt lives alone and will not have someone 24/7 to assist if needed.     Rehab Prognosis: Good; patient would benefit from acute skilled PT services to address these deficits and reach maximum level of function.    Recent Surgery: Procedure(s) (LRB):  CORONARY ARTERY BYPASS GRAFT (CABG) (N/A) 3 Days Post-Op    Plan:     During this hospitalization, patient to be seen 5 x/week to address the identified rehab impairments via gait training, therapeutic activities, therapeutic exercises and progress toward the following goals:    Plan of Care Expires:  10/08/22    Subjective     Chief Complaint: sternal pain  Patient/Family Comments/goals: to go home  Pain/Comfort:  Pain Rating 1: 3/10  Location 1: sternal  Pain Addressed 1: Pre-medicate for activity, Nurse notified    Patients cultural, spiritual, Sikhism conflicts given the current situation: no    Living Environment:  One story home with 3 steps to enter with no hand rail.   Prior to admission, patients level of function was independent.   Equipment used at home: none.  DME owned (not currently used): single point cane, shower chair, and rollator .  Upon discharge, patient will have assistance from possibly friends.    Objective:     Communicated with nurse prior to session.  Patient found supine with chest tube, telemetry, pulse ox (continuous)  upon PT entry to room.    General Precautions: Standard, sternal   Orthopedic Precautions:    Braces:    Respiratory Status: Room air  SpO2 93% during mobility, HR 95.     Exams:  Cognitive Exam:  Patient is oriented to Person, Place, Time, and Situation  RLE ROM: WNL  RLE Strength: Deficits: grossly 4/5  LLE ROM: WNL  LLE Strength: Deficits: grossly 4/5     Functional Mobility:  Bed Mobility:     Scooting: contact guard assistance  Supine to Sit: minimum assistance  Sit to Supine: stand by assistance  Transfers:     Sit to Stand:  contact guard assistance with rolling walker  Gait: 100ft with RW and CGA-SBA, slow pace, fatigued quickly. Pt c/o sternal pain.       AM-PAC 6 CLICK MOBILITY  Total Score:18     Patient left supine with all lines intact and call button in reach.    GOALS:   Multidisciplinary Problems       Physical Therapy Goals          Problem: Physical Therapy    Goal Priority Disciplines Outcome Goal Variances Interventions   Physical Therapy Goal     PT, PT/OT Ongoing, Progressing     Description: Goals to be met by: 10/8/2022     Patient will increase functional independence with mobility by performin. Supine to sit with [place]  2. Sit to stand transfer with [place]  3. Gait  x 200 feet with Modified [place] using rollator.   4. Ascend/descend 3 stair with no Handrails [place] using No Assistive Device.                          History:     History reviewed. No pertinent past medical history.    Past Surgical History:   Procedure Laterality Date    CORONARY ARTERY BYPASS GRAFT (CABG) N/A 2022    Procedure: CORONARY ARTERY BYPASS GRAFT (CABG);  Surgeon: Adolfo  MD Jared;  Location: [facility];  Service: Cardiothoracic;  Laterality: N/A;    HERNIA REPAIR      LEFT HEART CATHETERIZATION Left 8/31/2022    Procedure: Left heart cath;  Surgeon: Luis Daniel Maza MD;  Location: [facility] CATH LAB;  Service: Cardiology;  Laterality: Left;    PERCUTANEOUS TRANSLUMINAL BALLOON ANGIOPLASTY OF CORONARY ARTERY  8/31/2022    Procedure: Angioplasty-coronary;  Surgeon: Luis Daniel Maza MD;  Location: [facility] CATH LAB;  Service: Cardiology;;       Time Tracking:     PT Received On: 09/08/22  PT Start Time: 1358     PT Stop Time: 1414  PT Total Time (min): 16 min     Billable Minutes: Evaluation 16      09/08/2022

## 2022-09-08 NOTE — PLAN OF CARE
Problem: Physical Therapy  Goal: Physical Therapy Goal  Description: Goals to be met by: 10/8/2022     Patient will increase functional independence with mobility by performin. Supine to sit with [place]  2. Sit to stand transfer with [place]  3. Gait  x 200 feet with Modified [place] using rollator.   4. Ascend/descend 3 stair with no Handrails [place] using No Assistive Device.     Outcome: Ongoing, Progressing

## 2022-09-08 NOTE — PROGRESS NOTES
[facility]   Cardiology  Progress Note    Patient Name: Gustavo Cheung  MRN: [id number]  Admission Date: 8/30/2022  Hospital Length of Stay: 8 days  Code Status: Full Code   Attending Physician: Bahman Riojas MD   Primary Care Physician: No primary care provider on file.  Expected Discharge Date:   Principal Problem:Type 1 non-ST elevation myocardial infarction (NSTEMI)    Subjective:     Brief HPI:  Mr. Cheung is a 66 year old male who is unknown to [facility]. He presented to [facility] ED on 8.30.22 with complaints of chest pain that started yesterday. He describes the pain as pressure-like substernal, nonradiating with an association of diaphoresis. He reports that the pain lasted for about an hour and resolved with nitro SL provided by EMS. Upon arrival to the ED, his initial troponin was 1.078 and AST//104. He was also noted to be COVID positive. His UDS was positive for cannabis. His EKG demonstrated SR w/ T-wave inversion in lateral leads. He denies current CP, SOB, or palps. [facility] has been consulted to further evaluate the patient's CP and elevated troponin.     Hospital Course:  9.2.22: NAD. Denies CP, SOB, or palps. Heparin drip per protocol.   9.3.22: NAD. Denies CP, SOB, or palps. Continues on heparin drip per protocol.   9.4.22: NAD. Denies CP, SOB, or palps. Heparin drip continues. Potential plans for CABG tomorrow.  9.6.22: NAD. Denies SOB or palps. + incisional CP. VSS. Sitting up in chair.    9.7.22: NAD Noted. Vitals Stable. Intermittently tachycardic. BP Stable.  9.8.22: NAD Noted. Progressing well. SR 90's. BP Stable.    PMH: CAD, Pre-diabetes  PSH: hernia repair, right thigh & tendon repair surgery  Family History: Noncontributory  Social History: Ex smoker (quit in 2016). Reports occasional alcohol use (less than once/month). Denies drug use.      Previous Diagnostics:  CABG x 2 9.5.22:  LIMA - LAD, Reverse saphenous to PDA    [facility] 8.31.22  Left main-short, normal  Lad-99% prox,  80% Mid, Large diagonal occluded with circumflex collaterals  LCX-50% mid, IFR not significant  RCA-dominant, 80% distal involving PDA and PLB  EDP 15 mmHg     Maximize medical management  Consult CT surgery for bypass to the LAD, diagonal, PLB, PDA   If patient is turned down for surgery consider staged intervention to LAD across the diagonal  Check out has been given the cardiology service and on-call Cardiology  CT surgery consult has been placed    Echo 8.31.22  The left ventricle is normal in size with concentric remodeling and mildly decreased systolic function.  The estimated ejection fraction is 42%.  Regional wall motion abnormalities of the LAD and RCA territories.  Grade I left ventricular diastolic dysfunction.  Normal right ventricular size with normal right ventricular systolic function.  The estimated PA systolic pressure is 14 mmHg    Review of Systems   Cardiovascular:  Negative for chest pain.   Respiratory:  Negative for shortness of breath.    Musculoskeletal:         Incisional Soreness     Objective:     Vital Signs (Most Recent):  Temp: 98.4 °F (36.9 °C) (09/08/22 0802)  Pulse: 102 (09/08/22 0802)  Resp: 16 (09/08/22 0852)  BP: 121/68 (09/08/22 0802)  SpO2: (!) 92 % (09/08/22 0802)   Vital Signs (24h Range):  Temp:  [98.4 °F (36.9 °C)-98.6 °F (37 °C)] 98.4 °F (36.9 °C)  Pulse:  [] 102  Resp:  [16-20] 16  SpO2:  [92 %-100 %] 92 %  BP: (105-133)/(67-87) 121/68     Weight: (P) 74.6 kg (164 lb 7.4 oz)  Body mass index is 28.23 kg/m² (pended).    SpO2: (!) 92 %  O2 Device (Oxygen Therapy): nasal cannula      Intake/Output Summary (Last 24 hours) at 9/8/2022 1037  Last data filed at 9/8/2022 0100  Gross per 24 hour   Intake 480 ml   Output 1535 ml   Net -1055 ml         Lines/Drains/Airways       Drain  Duration                  Chest Tube 09/05/22 1008 Left Mediastinal 24 Fr. 3 days         Chest Tube 09/05/22 1008 Right Mediastinal 24 Fr. 3 days              Peripheral Intravenous Line   Duration                  Peripheral IV - Single Lumen 09/03/22 0230 20 G Left;Posterior Wrist 5 days                    Significant Labs:   Recent Results (from the past 72 hour(s))   POCT glucose    Collection Time: 09/05/22 11:26 AM   Result Value Ref Range    POCT Glucose 170 (H) 70 - 110 mg/dL   POCT ARTERIAL BLOOD GAS    Collection Time: 09/05/22 11:55 AM   Result Value Ref Range    POC PH 7.36     POC PCO2 37 mmHg    POC PO2 84 mmHg    POC SATURATED O2 96 %    POC Potassium 3.7 mmol/l    POC Sodium 140 mmol/l    POC Ionized Calcium 1.54 (AA) 0.790 - 1.40 mmol/l    POC HCO3 20.9 mmol/l    Base Deficit -4.1 mmol/l    POC Temp 37.0 C    Specimen source Arterial sample    POCT glucose    Collection Time: 09/05/22 11:56 AM   Result Value Ref Range    POCT Glucose 157 (H) 70 - 110 mg/dL   POCT glucose    Collection Time: 09/05/22 12:59 PM   Result Value Ref Range    POCT Glucose 126 (H) 70 - 110 mg/dL   POCT glucose    Collection Time: 09/05/22  1:52 PM   Result Value Ref Range    POCT Glucose 113 (H) 70 - 110 mg/dL   POCT ARTERIAL BLOOD GAS    Collection Time: 09/05/22  2:43 PM   Result Value Ref Range    POC PH 7.37     POC PCO2 40 mmHg    POC PO2 106 (A) mmHg    POC SATURATED O2 98 %    POC Potassium 3.6 mmol/l    POC Sodium 140 mmol/l    POC Ionized Calcium 1.26 (A) 1.12 - 1.23 mmol/l    POC HCO3 23.1 mmol/l    Base Deficit -2.0 mmol/l    POC Temp 37.0 C    Specimen source Arterial sample    POCT glucose    Collection Time: 09/05/22  2:55 PM   Result Value Ref Range    POCT Glucose 122 (H) 70 - 110 mg/dL   Potassium    Collection Time: 09/05/22  3:44 PM   Result Value Ref Range    Potassium Level 4.2 3.5 - 5.1 mmol/L   Hemoglobin and Hematocrit    Collection Time: 09/05/22  3:44 PM   Result Value Ref Range    Hgb 9.3 (L) 14.0 - 18.0 gm/dL    Hct 29.6 (L) 42.0 - 52.0 %   POCT glucose    Collection Time: 09/05/22  3:53 PM   Result Value Ref Range    POCT Glucose 118 (H) 70 - 110 mg/dL   POCT glucose    Collection  Time: 09/05/22  5:00 PM   Result Value Ref Range    POCT Glucose 122 (H) 70 - 110 mg/dL   POCT glucose    Collection Time: 09/05/22  5:49 PM   Result Value Ref Range    POCT Glucose 125 (H) 70 - 110 mg/dL   POCT glucose    Collection Time: 09/05/22  8:07 PM   Result Value Ref Range    POCT Glucose 98 70 - 110 mg/dL   POCT glucose    Collection Time: 09/05/22  9:13 PM   Result Value Ref Range    POCT Glucose 127 (H) 70 - 110 mg/dL   POCT glucose    Collection Time: 09/05/22 10:04 PM   Result Value Ref Range    POCT Glucose 125 (H) 70 - 110 mg/dL   POCT glucose    Collection Time: 09/05/22 11:05 PM   Result Value Ref Range    POCT Glucose 110 70 - 110 mg/dL   POCT glucose    Collection Time: 09/06/22 12:04 AM   Result Value Ref Range    POCT Glucose 108 70 - 110 mg/dL   POCT glucose    Collection Time: 09/06/22  1:16 AM   Result Value Ref Range    POCT Glucose 119 (H) 70 - 110 mg/dL   Basic metabolic panel    Collection Time: 09/06/22  1:23 AM   Result Value Ref Range    Sodium Level 138 136 - 145 mmol/L    Potassium Level 4.5 3.5 - 5.1 mmol/L    Chloride 107 98 - 107 mmol/L    Carbon Dioxide 24 23 - 31 mmol/L    Glucose Level 122 (H) 82 - 115 mg/dL    Blood Urea Nitrogen 9.0 8.4 - 25.7 mg/dL    Creatinine 0.89 0.73 - 1.18 mg/dL    BUN/Creatinine Ratio 10     Calcium Level Total 8.9 8.8 - 10.0 mg/dL    Anion Gap 7.0 mEq/L    eGFR >60 mls/min/1.73/m2   CBC with Differential    Collection Time: 09/06/22  1:23 AM   Result Value Ref Range    WBC 12.0 (H) 4.5 - 11.5 x10(3)/mcL    RBC 3.47 (L) 4.70 - 6.10 x10(6)/mcL    Hgb 10.4 (L) 14.0 - 18.0 gm/dL    Hct 32.0 (L) 42.0 - 52.0 %    MCV 92.2 80.0 - 94.0 fL    MCH 30.0 27.0 - 31.0 pg    MCHC 32.5 (L) 33.0 - 36.0 mg/dL    RDW 13.9 11.5 - 17.0 %    Platelet 251 130 - 400 x10(3)/mcL    MPV 11.0 (H) 7.4 - 10.4 fL    Neut % 81.2 %    Lymph % 6.7 %    Mono % 11.3 %    Eos % 0.0 %    Basophil % 0.2 %    Lymph # 0.81 0.6 - 4.6 x10(3)/mcL    Neut # 9.8 (H) 2.1 - 9.2 x10(3)/mcL    Mono  # 1.36 (H) 0.1 - 1.3 x10(3)/mcL    Eos # 0.00 0 - 0.9 x10(3)/mcL    Baso # 0.02 0 - 0.2 x10(3)/mcL    IG# 0.07 (H) 0 - 0.04 x10(3)/mcL    IG% 0.6 %    NRBC% 0.0 %   POCT glucose    Collection Time: 09/06/22  2:07 AM   Result Value Ref Range    POCT Glucose 121 (H) 70 - 110 mg/dL   POCT glucose    Collection Time: 09/06/22  3:06 AM   Result Value Ref Range    POCT Glucose 109 70 - 110 mg/dL   POCT glucose    Collection Time: 09/06/22  4:12 AM   Result Value Ref Range    POCT Glucose 109 70 - 110 mg/dL   POCT glucose    Collection Time: 09/06/22  6:15 AM   Result Value Ref Range    POCT Glucose 117 (H) 70 - 110 mg/dL   POCT glucose    Collection Time: 09/06/22  8:20 AM   Result Value Ref Range    POCT Glucose 83 70 - 110 mg/dL   Transesophageal echo (VARUN)    Collection Time: 09/06/22  9:52 AM   Result Value Ref Range    BSA 1.75 m2   POCT glucose    Collection Time: 09/06/22  5:10 PM   Result Value Ref Range    POCT Glucose 118 (H) 70 - 110 mg/dL   POCT glucose    Collection Time: 09/06/22 11:21 PM   Result Value Ref Range    POCT Glucose 107 70 - 110 mg/dL   Basic metabolic panel    Collection Time: 09/07/22  1:34 AM   Result Value Ref Range    Sodium Level 137 136 - 145 mmol/L    Potassium Level 3.9 3.5 - 5.1 mmol/L    Chloride 108 (H) 98 - 107 mmol/L    Carbon Dioxide 23 23 - 31 mmol/L    Glucose Level 116 (H) 82 - 115 mg/dL    Blood Urea Nitrogen 8.7 8.4 - 25.7 mg/dL    Creatinine 0.72 (L) 0.73 - 1.18 mg/dL    BUN/Creatinine Ratio 12     Calcium Level Total 9.0 8.8 - 10.0 mg/dL    Anion Gap 6.0 mEq/L    eGFR >60 mls/min/1.73/m2   CBC with Differential    Collection Time: 09/07/22  1:34 AM   Result Value Ref Range    WBC 11.5 4.5 - 11.5 x10(3)/mcL    RBC 3.42 (L) 4.70 - 6.10 x10(6)/mcL    Hgb 10.1 (L) 14.0 - 18.0 gm/dL    Hct 32.6 (L) 42.0 - 52.0 %    MCV 95.3 (H) 80.0 - 94.0 fL    MCH 29.5 27.0 - 31.0 pg    MCHC 31.0 (L) 33.0 - 36.0 mg/dL    RDW 14.1 11.5 - 17.0 %    Platelet 216 130 - 400 x10(3)/mcL    MPV 11.1  (H) 7.4 - 10.4 fL    IG# 0.07 (H) 0 - 0.04 x10(3)/mcL    IG% 0.6 %    NRBC% 0.0 %   Manual Differential    Collection Time: 09/07/22  1:34 AM   Result Value Ref Range    Neut Man 91 %    Lymph Man 6 %    Monocyte Man 3 %    Instr WBC 11.5 x10(3)/mcL    Abs Mono 0.345 0.1 - 1.3 x10(3)/mcL    Abs Lymp 0.69 0.6 - 4.6 x10(3)/mcL    Abs Neut 10.465 (H) 2.1 - 9.2 x10(3)/mcL    RBC Morph Abnormal (A) Normal    Poik 1+ (A) (none)    Luis Cells 1+ (A) (none)    Platelet Est Adequate Normal, Adequate   POCT glucose    Collection Time: 09/07/22 10:45 AM   Result Value Ref Range    POCT Glucose 105 70 - 110 mg/dL   POCT glucose    Collection Time: 09/07/22  5:32 PM   Result Value Ref Range    POCT Glucose 87 70 - 110 mg/dL   Comprehensive Metabolic Panel    Collection Time: 09/08/22  1:39 AM   Result Value Ref Range    Sodium Level 139 136 - 145 mmol/L    Potassium Level 3.6 3.5 - 5.1 mmol/L    Chloride 108 (H) 98 - 107 mmol/L    Carbon Dioxide 21 (L) 23 - 31 mmol/L    Glucose Level 94 82 - 115 mg/dL    Blood Urea Nitrogen 11.5 8.4 - 25.7 mg/dL    Creatinine 0.73 0.73 - 1.18 mg/dL    Calcium Level Total 9.1 8.8 - 10.0 mg/dL    Protein Total 5.8 5.8 - 7.6 gm/dL    Albumin Level 2.8 (L) 3.4 - 4.8 gm/dL    Globulin 3.0 2.4 - 3.5 gm/dL    Albumin/Globulin Ratio 0.9 (L) 1.1 - 2.0 ratio    Bilirubin Total 0.6 <=1.5 mg/dL    Alkaline Phosphatase 54 40 - 150 unit/L    Alanine Aminotransferase 16 0 - 55 unit/L    Aspartate Aminotransferase 17 5 - 34 unit/L    eGFR >60 mls/min/1.73/m2   Magnesium    Collection Time: 09/08/22  1:39 AM   Result Value Ref Range    Magnesium Level 1.90 1.60 - 2.60 mg/dL       Telemetry: Sinus Rhythm 80's    Physical Exam  Vitals reviewed.   Constitutional:       Appearance: Normal appearance.   HENT:      Head: Normocephalic.      Mouth/Throat:      Pharynx: Oropharynx is clear.   Cardiovascular:      Rate and Rhythm: Normal rate and regular rhythm.      Comments: Sinus Rhythm 80's  Pulmonary:      Effort:  Pulmonary effort is normal. No respiratory distress.      Breath sounds: Normal breath sounds.   Abdominal:      General: There is no distension.      Palpations: Abdomen is soft.      Tenderness: There is no abdominal tenderness.   Musculoskeletal:         General: Normal range of motion.      Cervical back: Neck supple.      Comments: Chest Tubes in Place.   Skin:     General: Skin is warm and dry.      Capillary Refill: Capillary refill takes less than 2 seconds.   Neurological:      General: No focal deficit present.      Mental Status: He is alert and oriented to person, place, and time. Mental status is at baseline.   Psychiatric:         Behavior: Behavior normal.       Current Inpatient Medications:    Current Facility-Administered Medications:     0.9%  NaCl infusion (for blood administration), , Intravenous, Q24H PRN, ITA Jerez    0.9%  NaCl infusion (for blood administration), , Intravenous, Q24H PRN, ITA Jerez    0.9%  NaCl infusion (for blood administration), , Intravenous, Q24H PRN, Adolfo Coleman MD    acetaminophen oral solution 650 mg, 650 mg, Per OG tube, Q6H PRN, Ross Gutierrez PA-C    acetaminophen tablet 1,000 mg, 1,000 mg, Oral, Q6H PRN, Yomi Milligan MD, 1,000 mg at 08/31/22 1934    acetaminophen tablet 650 mg, 650 mg, Oral, Q4H PRN, Yomi Milligan MD    acetaminophen tablet 650 mg, 650 mg, Oral, Q4H PRN, Luis Daniel Maza MD, 650 mg at 09/03/22 2042    albumin human 5% bottle 12.5 g, 12.5 g, Intravenous, PRN, Ross Gutierrez PA-C, Stopped at 09/05/22 1621    aluminum-magnesium hydroxide-simethicone 200-200-20 mg/5 mL suspension 30 mL, 30 mL, Oral, QID PRN, Yomi Milligan MD    aspirin chewable tablet 81 mg, 81 mg, Oral, Daily, Yomi Milligan MD, 81 mg at 09/08/22 0852    atorvastatin tablet 40 mg, 40 mg, Oral, QHS, Bahman Riojas MD, 40 mg at 09/07/22 2054    benzonatate capsule 200 mg, 200 mg, Oral, TID PRN, Yomi Milligan MD    cefazolin (ANCEF) 2 gram in dextrose 5% 50 mL IVPB (premix), 2  g, Intravenous, On Call Procedure, ITA Jerez    clopidogreL tablet 75 mg, 75 mg, Oral, Daily, Ross Gutierrez PA-C, 75 mg at 09/08/22 0853    dextromethorphan-guaiFENesin  mg/5 ml liquid 5 mL, 5 mL, Oral, Q4H PRN, Yomi Milligan MD    dextrose 10% bolus 125 mL, 12.5 g, Intravenous, PRN, Ross Gutierrez PA-C    dextrose 10% bolus 250 mL, 25 g, Intravenous, PRN, Ross Gutierrez PA-C    docusate sodium capsule 100 mg, 100 mg, Oral, BID, Ross Gutierrez PA-C, 100 mg at 09/08/22 0852    enoxaparin injection 40 mg, 40 mg, Subcutaneous, Daily, Adolfo Coleman MD, 40 mg at 09/07/22 1730    famotidine tablet 20 mg, 20 mg, Oral, BID, Yomi Milligan MD, 20 mg at 09/08/22 0852    folic acid tablet 1 mg, 1 mg, Oral, Daily, Ross Gutierrez PA-C, 1 mg at 09/08/22 0852    HYDROcodone-acetaminophen 5-325 mg per tablet 1 tablet, 1 tablet, Oral, Q4H PRN, Ross Gutierrez PA-C, 1 tablet at 09/08/22 0852    iopamidoL (ISOVUE-370) injection, , , PRN, Luis Daniel Maza MD, 100 mL at 08/31/22 1635    lactulose 10 gram/15 ml solution 20 g, 20 g, Oral, Q6H PRN, Ross Gutierrez PA-C    LIDOcaine HCL 10 mg/ml (1%) injection, , , PRN, Luis Daniel Maza MD, 10 mL at 08/31/22 1546    loperamide capsule 4 mg, 4 mg, Oral, Once, Ross Gutierrez PA-C    magnesium sulfate 2g in water 50mL IVPB (premix), 2 g, Intravenous, PRN, Ross Gutierrez PA-C    magnesium sulfate 2g in water 50mL IVPB (premix), 4 g, Intravenous, PRN, Ross Gutierrez PA-C    melatonin tablet 6 mg, 6 mg, Oral, Nightly PRN, Yomi Milligan MD    metoclopramide HCl injection 5 mg, 5 mg, Intravenous, Q6H PRN, Ross Gutierrez PA-C    metoprolol tartrate (LOPRESSOR) split tablet 12.5 mg, 12.5 mg, Oral, BID, Ross Gutierrez PA-C, 12.5 mg at 09/08/22 0852    mupirocin 2 % ointment, , Nasal, On Call Procedure, ITA Jerez    mupirocin 2 % ointment, , Nasal, BID, Ross Gutierrez PA-C, Given at 09/08/22 0854    nitroGLYCERIN injection, , , PRN, Luis Daniel Maza MD, 200 mcg  at 08/31/22 1603    nitroGLYCERIN SL tablet 0.4 mg, 0.4 mg, Sublingual, Q5 Min PRN, Yomi Milligan MD    ondansetron disintegrating tablet 8 mg, 8 mg, Oral, Q8H PRN, Luis Daniel Maza MD    ondansetron injection 4 mg, 4 mg, Intravenous, Q4H PRN, Yoim Milligan MD, 4 mg at 09/05/22 2122    ondansetron injection 4 mg, 4 mg, Intravenous, Q12H PRN, Ross Gutierrez PA-C    oxyCODONE immediate release tablet 5 mg, 5 mg, Oral, Q4H PRN, Ross Gutierrez PA-C, 5 mg at 09/06/22 2135    polyethylene glycol packet 17 g, 17 g, Oral, BID PRN, Yomi Milligan MD    prochlorperazine injection Soln 5 mg, 5 mg, Intravenous, Q6H PRN, Yomi Milligan MD    senna-docusate 8.6-50 mg per tablet 1 tablet, 1 tablet, Oral, BID PRN, Yomi Milligan MD    simethicone chewable tablet 80 mg, 1 tablet, Oral, QID PRN, Yomi Milligan MD    sodium chloride 0.9% flush 10 mL, 10 mL, Intravenous, PRN, Yomi Milligan MD    sodium chloride 0.9% flush 10 mL, 10 mL, Intravenous, PRN, Mouna Rm, FNP    sodium phosphate 15 mmol in dextrose 5 % 250 mL IVPB, 15 mmol, Intravenous, PRN, Ross Gutierrez PA-C    sodium phosphate 20.01 mmol in dextrose 5 % 250 mL IVPB, 20.01 mmol, Intravenous, PRN, Ross Gutierrez PA-C    sodium phosphate 30 mmol in dextrose 5 % 250 mL IVPB, 30 mmol, Intravenous, PRN, Ross Gutierrez PA-C    sucralfate tablet 1 g, 1 g, Oral, QID (AC & HS), Ross Gutierrez PA-C, 1 g at 09/08/22 0711    verapamiL injection, , , PRN, Luis Daniel Maza MD, 2.5 mg at 08/31/22 1548    VTE Risk Mitigation (From admission, onward)           Ordered     enoxaparin injection 40 mg  Daily         09/06/22 1722     IP VTE LOW RISK PATIENT  Once         08/30/22 2316     Place sequential compression device  Until discontinued         08/30/22 2316                    Assessment:   CAD (Multi Vessel)    - Left main-normal; LAD-99% prox, mid, large diagonal occluded with circumflex collaterals; Left CX-50% mid, IFR not significant; RCA-dominant, 80% distal involving  PDA and PLB; EDP 15 mm HG.    - CABG x 2 (9.5.22): LIMA - LAD, Reverse saphenous to PDA  NSTEMI -Type 1  Acute Systolic/Diastolic Heart Failure (Compensated)    - EF is 42%    - Grade I DD    - regional wall motion abnormalities of the LAD and RCA territories  Recent COVID 19 Infection (Resolved, Now Negative) (Asymptomatic)    - Taken off Precautions on 9.8.22  Pre DM  Anemia (Stable)  No Hx of GIB    Plan:   Continue Current Cardiac Medications  Increase Metoprolol to 25 Mg PO BID  Start Lisinopril 5 Mg Daily with Hold Parameters  Advance Mobilization &  Continue Regular IS Usage  Replete K+ & Mag    SHELBY Cruz  Cardiology  [facility]   09/08/2022

## 2022-09-08 NOTE — PT/OT/SLP EVAL
Occupational Therapy   Evaluation    Name: Gustavo Cheung  MRN: [id number]  Admitting Diagnosis:  Type 1 non-ST elevation myocardial infarction (NSTEMI)  Recent Surgery: Procedure(s) (LRB):  CORONARY ARTERY BYPASS GRAFT (CABG) (N/A) 3 Days Post-Op    Recommendations:     Discharge Recommendations: rehabilitation facility vs home with home health (with family/friend assist provided)  Discharge Equipment Recommendations: walker, rolling, bath bench (TBD, pending progress.)  Barriers to discharge: Decreased caregiver support    Assessment:     Gustavo Cheung is a 67 y.o. male with a medical diagnosis of Type 1 non-ST elevation myocardial infarction (NSTEMI). Performance deficits affecting function: weakness, impaired endurance, impaired self care skills, impaired functional mobility, impaired balance, decreased safety awareness, pain.      Rehab Prognosis: Good; patient would benefit from acute skilled OT services to address these deficits and reach maximum level of function.       Plan:     Patient to be seen 5 x/week, 6 x/week, daily to address the above listed problems via self-care/home management, therapeutic activities, therapeutic exercises  Plan of Care Expires: 09/22/22  Plan of Care Reviewed with: patient    Subjective     Chief Complaint: Pt denied any chest pain during eval, but reported pain on L aspect of back and L ribs after bed mobility task. RN was notified.  Patient/Family Comments/goals: Return to [facility].    Occupational Profile:  Living Environment: Lives alone in [facility] with 3 JERO and NO HR. Owns tub/shower with shower chair, GBs, and HHS.  Previous level of function: Independent with ADL/IADLs and driving.  DME:  Pt owns a rollator, SC, and shower chair. Pt also thinks that he owns a w/c. However, pt reported that he did not utilize any DME prior to hospitalization.  Assistance upon Discharge: TBD.     Patients cultural, spiritual, Oriental orthodox conflicts given the current situation: no    Objective:      Communicated with: RN prior to session.  Patient found HOB elevated with telemetry, pulse ox (continuous), chest tube, peripheral IV upon OT entry to room.    General Precautions: Standard, fall, sternal   Respiratory Status: Room air  Vitals:  BP: 109/71  MN: 95-96  O2: 91-93%  RN was notified of pt's vitals.    Occupational Performance:    Bed Mobility:    Patient t/f from lying with HOB elevated to EOB with contact guard assistance and BUEs positioned across chest (holding cardiac bear).  Patient completed Sit to Supine with stand by assistance, vcs provided, and BUEs positioned across chest (holding cardiac bear).    Functional Mobility/Transfers:  Patient completed Sit <> Stand Transfer with stand by assistance and BUEs positioned across chest.   Pt performed toilet t/f with CGA provided, using RW during gait and with BUEs positioned across chest during sit <> stand.  Functional Mobility: Pt ambulated <> bathroom using RW with CGA progressing to SBA and vcs provided.    Activities of Daily Living:  Toileting: Pt performed task with SBA provided while seated on toilet. However, pt was not wearing LB clothing item during eval.    Cognitive/Visual Perceptual:  Cognitive/Psychosocial Skills:     -       Oriented to: Person, Place, Time, and Situation   -       Follows Commands/attention:Follows one-step commands and Follows two-step commands  -       Safety awareness/insight to disability: impaired     Physical Exam:  Sensation:    -       Intact  Upper Extremity Range of Motion:     -       BUEs: WFL within sternal pxns.  Upper Extremity Strength:    -       BUEs: WFL within sternal pxns.    Treatment & Education:  OT provided education on sternal pxns to increase pt's safety and independence during ADL/IADL participation. Pt verbalized and demonstrated good understanding with vcs provided during eval.    Patient left HOB elevated with all lines intact and call button in reach    GOALS:   Multidisciplinary  Problems       Occupational Therapy Goals          Problem: Occupational Therapy    Goal Priority Disciplines Outcome Interventions   Occupational Therapy Goal     OT, PT/OT Ongoing, Progressing    Description: Goals to be met by: 9/22/22    Patient will increase functional independence with ADLs by performing:    LE Dressing with Modified [place].  Grooming while standing at sink with Modified [place].  Toileting from toilet with Modified [place] for hygiene and clothing management.                          History:     History reviewed. No pertinent past medical history.      Past Surgical History:   Procedure Laterality Date    CORONARY ARTERY BYPASS GRAFT (CABG) N/A 9/5/2022    Procedure: CORONARY ARTERY BYPASS GRAFT (CABG);  Surgeon: Adolfo Coleman MD;  Location: [facility] OR;  Service: Cardiothoracic;  Laterality: N/A;    HERNIA REPAIR      LEFT HEART CATHETERIZATION Left 8/31/2022    Procedure: Left heart cath;  Surgeon: Luis Daniel Maza MD;  Location: [facility] CATH LAB;  Service: Cardiology;  Laterality: Left;    PERCUTANEOUS TRANSLUMINAL BALLOON ANGIOPLASTY OF CORONARY ARTERY  8/31/2022    Procedure: Angioplasty-coronary;  Surgeon: Luis Daniel Maza MD;  Location: [facility] CATH LAB;  Service: Cardiology;;       Time Tracking:     OT Date of Treatment: 9/8/22  OT Start Time: 1428  OT Stop Time: 1501  OT Total Time (min): 33 min    Billable Minutes:Evaluation Mod complexity 33 mins    9/8/2022

## 2022-09-08 NOTE — PROGRESS NOTES
09/08/22 0820   Pre Exercise Vitals   /85   Pulse 107   Supplemental O2? No   SpO2 94 %   During Exercise Vitals   Pulse 108   Supplemental O2? Yes   O2 Device nasal cannula   O2 Flow (L/min) 2   SpO2 91 %  (o2 sat dropped to 85% on room air. applied o2 2lnc)   Distance Walked 300 feet   Post Exercise Vitals   BP (!) 129/98   Pulse 108   Supplemental O2? No   SpO2 94 %   Pain - Post exercise C/o of pain at chest tube site. Reported to nurse. To give pain med.   Modality   Modality Walker  (Standby assist only. sternal precautions maintained. Performed incentive spirometer. max 1000.)   Report given to nurse.

## 2022-09-09 LAB
ABS NEUT (OLG): 6.57 X10(3)/MCL (ref 2.1–9.2)
ALBUMIN SERPL-MCNC: 2.6 GM/DL (ref 3.4–4.8)
ALBUMIN/GLOB SERPL: 0.8 RATIO (ref 1.1–2)
ALP SERPL-CCNC: 67 UNIT/L (ref 40–150)
ALT SERPL-CCNC: 15 UNIT/L (ref 0–55)
AST SERPL-CCNC: 13 UNIT/L (ref 5–34)
BASOPHILS NFR BLD MANUAL: 0.09 X10(3)/MCL (ref 0–0.2)
BASOPHILS NFR BLD MANUAL: 1 %
BILIRUBIN DIRECT+TOT PNL SERPL-MCNC: 0.5 MG/DL
BUN SERPL-MCNC: 10.8 MG/DL (ref 8.4–25.7)
CALCIUM SERPL-MCNC: 8.9 MG/DL (ref 8.8–10)
CHLORIDE SERPL-SCNC: 109 MMOL/L (ref 98–107)
CO2 SERPL-SCNC: 24 MMOL/L (ref 23–31)
CREAT SERPL-MCNC: 0.7 MG/DL (ref 0.73–1.18)
EOSINOPHIL NFR BLD MANUAL: 0.18 X10(3)/MCL (ref 0–0.9)
EOSINOPHIL NFR BLD MANUAL: 2 %
ERYTHROCYTE [DISTWIDTH] IN BLOOD BY AUTOMATED COUNT: 13.8 % (ref 11.5–17)
GFR SERPLBLD CREATININE-BSD FMLA CKD-EPI: >60 MLS/MIN/1.73/M2
GLOBULIN SER-MCNC: 3.3 GM/DL (ref 2.4–3.5)
GLUCOSE SERPL-MCNC: 100 MG/DL (ref 82–115)
HCT VFR BLD AUTO: 30.1 % (ref 42–52)
HGB BLD-MCNC: 9.9 GM/DL (ref 14–18)
IMM GRANULOCYTES # BLD AUTO: 0.05 X10(3)/MCL (ref 0–0.04)
IMM GRANULOCYTES NFR BLD AUTO: 0.6 %
INSTRUMENT WBC (OLG): 9 X10(3)/MCL
LYMPHOCYTES NFR BLD MANUAL: 1.44 X10(3)/MCL
LYMPHOCYTES NFR BLD MANUAL: 16 %
MAGNESIUM SERPL-MCNC: 2.1 MG/DL (ref 1.6–2.6)
MCH RBC QN AUTO: 29.6 PG (ref 27–31)
MCHC RBC AUTO-ENTMCNC: 32.9 MG/DL (ref 33–36)
MCV RBC AUTO: 89.9 FL (ref 80–94)
MONOCYTES NFR BLD MANUAL: 0.81 X10(3)/MCL (ref 0.1–1.3)
MONOCYTES NFR BLD MANUAL: 9 %
NEUTROPHILS NFR BLD MANUAL: 73 %
NRBC BLD AUTO-RTO: 0 %
PLATELET # BLD AUTO: 332 X10(3)/MCL (ref 130–400)
PLATELET # BLD EST: NORMAL 10*3/UL
PMV BLD AUTO: 10.3 FL (ref 7.4–10.4)
POTASSIUM SERPL-SCNC: 3.7 MMOL/L (ref 3.5–5.1)
PROT SERPL-MCNC: 5.9 GM/DL (ref 5.8–7.6)
RBC # BLD AUTO: 3.35 X10(6)/MCL (ref 4.7–6.1)
RBC MORPH BLD: NORMAL
SODIUM SERPL-SCNC: 141 MMOL/L (ref 136–145)
WBC # SPEC AUTO: 8.8 X10(3)/MCL (ref 4.5–11.5)

## 2022-09-09 PROCEDURE — 21400001 HC TELEMETRY ROOM

## 2022-09-09 PROCEDURE — 94761 N-INVAS EAR/PLS OXIMETRY MLT: CPT

## 2022-09-09 PROCEDURE — 83735 ASSAY OF MAGNESIUM: CPT | Performed by: INTERNAL MEDICINE

## 2022-09-09 PROCEDURE — 63600175 PHARM REV CODE 636 W HCPCS: Performed by: PHYSICIAN ASSISTANT

## 2022-09-09 PROCEDURE — 25000003 PHARM REV CODE 250: Performed by: INTERNAL MEDICINE

## 2022-09-09 PROCEDURE — 97116 GAIT TRAINING THERAPY: CPT | Mod: CQ

## 2022-09-09 PROCEDURE — 97535 SELF CARE MNGMENT TRAINING: CPT | Mod: CO

## 2022-09-09 PROCEDURE — 85025 COMPLETE CBC W/AUTO DIFF WBC: CPT | Performed by: INTERNAL MEDICINE

## 2022-09-09 PROCEDURE — 97530 THERAPEUTIC ACTIVITIES: CPT | Mod: CQ

## 2022-09-09 PROCEDURE — 99024 PR POST-OP FOLLOW-UP VISIT: ICD-10-PCS | Mod: POP,,, | Performed by: PHYSICIAN ASSISTANT

## 2022-09-09 PROCEDURE — 97110 THERAPEUTIC EXERCISES: CPT

## 2022-09-09 PROCEDURE — 99900035 HC TECH TIME PER 15 MIN (STAT)

## 2022-09-09 PROCEDURE — 25000003 PHARM REV CODE 250: Performed by: PHYSICIAN ASSISTANT

## 2022-09-09 PROCEDURE — 99024 POSTOP FOLLOW-UP VISIT: CPT | Mod: POP,,, | Performed by: PHYSICIAN ASSISTANT

## 2022-09-09 PROCEDURE — 94760 N-INVAS EAR/PLS OXIMETRY 1: CPT

## 2022-09-09 PROCEDURE — 80053 COMPREHEN METABOLIC PANEL: CPT | Performed by: INTERNAL MEDICINE

## 2022-09-09 PROCEDURE — 25000003 PHARM REV CODE 250: Performed by: NURSE PRACTITIONER

## 2022-09-09 PROCEDURE — 63600175 PHARM REV CODE 636 W HCPCS: Performed by: THORACIC SURGERY (CARDIOTHORACIC VASCULAR SURGERY)

## 2022-09-09 PROCEDURE — 36415 COLL VENOUS BLD VENIPUNCTURE: CPT | Performed by: INTERNAL MEDICINE

## 2022-09-09 RX ORDER — POTASSIUM CHLORIDE 20 MEQ/1
20 TABLET, EXTENDED RELEASE ORAL ONCE
Status: COMPLETED | OUTPATIENT
Start: 2022-09-09 | End: 2022-09-09

## 2022-09-09 RX ORDER — LISINOPRIL 10 MG/1
10 TABLET ORAL DAILY
Status: DISCONTINUED | OUTPATIENT
Start: 2022-09-10 | End: 2022-09-10 | Stop reason: HOSPADM

## 2022-09-09 RX ORDER — METOPROLOL SUCCINATE 50 MG/1
50 TABLET, EXTENDED RELEASE ORAL DAILY
Status: DISCONTINUED | OUTPATIENT
Start: 2022-09-10 | End: 2022-09-10 | Stop reason: HOSPADM

## 2022-09-09 RX ORDER — METOPROLOL TARTRATE 25 MG/1
25 TABLET, FILM COATED ORAL ONCE
Status: COMPLETED | OUTPATIENT
Start: 2022-09-09 | End: 2022-09-09

## 2022-09-09 RX ADMIN — DOCUSATE SODIUM 100 MG: 100 CAPSULE, LIQUID FILLED ORAL at 09:09

## 2022-09-09 RX ADMIN — ATORVASTATIN CALCIUM 40 MG: 40 TABLET, FILM COATED ORAL at 09:09

## 2022-09-09 RX ADMIN — MUPIROCIN: 20 OINTMENT TOPICAL at 09:09

## 2022-09-09 RX ADMIN — MUPIROCIN: 20 OINTMENT TOPICAL at 08:09

## 2022-09-09 RX ADMIN — METOPROLOL TARTRATE 25 MG: 25 TABLET, FILM COATED ORAL at 08:09

## 2022-09-09 RX ADMIN — SUCRALFATE 1 G: 1 TABLET ORAL at 08:09

## 2022-09-09 RX ADMIN — CLOPIDOGREL 75 MG: 75 TABLET, FILM COATED ORAL at 08:09

## 2022-09-09 RX ADMIN — SUCRALFATE 1 G: 1 TABLET ORAL at 09:09

## 2022-09-09 RX ADMIN — SUCRALFATE 1 G: 1 TABLET ORAL at 04:09

## 2022-09-09 RX ADMIN — ASPIRIN 81 MG: 81 TABLET, COATED ORAL at 08:09

## 2022-09-09 RX ADMIN — LISINOPRIL 5 MG: 5 TABLET ORAL at 08:09

## 2022-09-09 RX ADMIN — FAMOTIDINE 20 MG: 20 TABLET, FILM COATED ORAL at 09:09

## 2022-09-09 RX ADMIN — POTASSIUM CHLORIDE 20 MEQ: 1500 TABLET, EXTENDED RELEASE ORAL at 10:09

## 2022-09-09 RX ADMIN — FAMOTIDINE 20 MG: 20 TABLET, FILM COATED ORAL at 08:09

## 2022-09-09 RX ADMIN — ENOXAPARIN SODIUM 40 MG: 40 INJECTION SUBCUTANEOUS at 04:09

## 2022-09-09 RX ADMIN — FOLIC ACID 1 MG: 1 TABLET ORAL at 08:09

## 2022-09-09 RX ADMIN — DOCUSATE SODIUM 100 MG: 100 CAPSULE, LIQUID FILLED ORAL at 08:09

## 2022-09-09 RX ADMIN — METOCLOPRAMIDE 5 MG: 5 INJECTION, SOLUTION INTRAMUSCULAR; INTRAVENOUS at 09:09

## 2022-09-09 RX ADMIN — SUCRALFATE 1 G: 1 TABLET ORAL at 10:09

## 2022-09-09 RX ADMIN — METOPROLOL TARTRATE 25 MG: 25 TABLET, FILM COATED ORAL at 09:09

## 2022-09-09 NOTE — PROGRESS NOTES
[facility]   Card[facility]  Progress Note    Patient Name: Gustavo Cheung  MRN: [id number]  Admission Date: 8/30/2022  Hospital Length of Stay: 9 days  Code Status: Full Code   Attending Physician: Frank Conklin MD   Primary Care Physician: No primary care provider on file.  Expected Discharge Date:   Principal Problem:Type 1 non-ST elevation myocardial infarction (NSTEMI)    Subjective:     Brief HPI:  Mr. Cheung is a 66 year old male who is unknown to [facility]. He presented to [facility] ED on 8.30.22 with complaints of chest pain that started yesterday. He describes the pain as pressure-like substernal, nonradiating with an association of diaphoresis. He reports that the pain lasted for about an hour and resolved with nitro SL provided by EMS. Upon arrival to the ED, his initial troponin was 1.078 and AST//104. He was also noted to be COVID positive. His UDS was positive for cannabis. His EKG demonstrated SR w/ T-wave inversion in lateral leads. He denies current CP, SOB, or palps. [facility] has been consulted to further evaluate the patient's CP and elevated troponin.     Hospital Course:  9.2.22: NAD. Denies CP, SOB, or palps. Heparin drip per protocol.   9.3.22: NAD. Denies CP, SOB, or palps. Continues on heparin drip per protocol.   9.4.22: NAD. Denies CP, SOB, or palps. Heparin drip continues. Potential plans for CABG tomorrow.  9.6.22: NAD. Denies SOB or palps. + incisional CP. VSS. Sitting up in chair.    9.7.22: NAD Noted. Vitals Stable. Intermittently tachycardic. BP Stable.  9.8.22: NAD Noted. Progressing well. SR 90's. BP Stable.  9.9.22: NAD Noted. Vitals Stable. SR on Tele.    PMH: CAD, Pre-diabetes  PSH: hernia repair, right thigh & tendon repair surgery  Family History: Noncontributory  Social History: Ex smoker (quit in 2016). Reports occasional alcohol use (less than once/month). Denies drug use.      Previous Diagnostics:  CABG x 2 9.5.22:  LIMA - LAD, Reverse saphenous to  PDA    [facility] 8.31.22  Left main-short, normal  Lad-99% prox, 80% Mid, Large diagonal occluded with circumflex collaterals  LCX-50% mid, IFR not significant  RCA-dominant, 80% distal involving PDA and PLB  EDP 15 mmHg     Maximize medical management  Consult CT surgery for bypass to the LAD, diagonal, PLB, PDA   If patient is turned down for surgery consider staged intervention to LAD across the diagonal  Check out has been given the cardiology service and on-call Cardiology  CT surgery consult has been placed    Echo 8.31.22  The left ventricle is normal in size with concentric remodeling and mildly decreased systolic function.  The estimated ejection fraction is 42%.  Regional wall motion abnormalities of the LAD and RCA territories.  Grade I left ventricular diastolic dysfunction.  Normal right ventricular size with normal right ventricular systolic function.  The estimated PA systolic pressure is 14 mmHg    Review of Systems   Cardiovascular:  Negative for chest pain.   Respiratory:  Negative for shortness of breath.      Objective:     Vital Signs (Most Recent):  Temp: 98 °F (36.7 °C) (09/09/22 0720)  Pulse: 94 (09/09/22 0720)  Resp: 20 (09/09/22 0354)  BP: 116/79 (09/09/22 0811)  SpO2: 95 % (09/09/22 0720)   Vital Signs (24h Range):  Temp:  [98 °F (36.7 °C)-98.8 °F (37.1 °C)] 98 °F (36.7 °C)  Pulse:  [79-98] 94  Resp:  [16-20] 20  SpO2:  [94 %-100 %] 95 %  BP: (103-149)/(68-84) 116/79     Weight: 74 kg (163 lb 2.3 oz)  Body mass index is 28 kg/m².    SpO2: 95 %  O2 Device (Oxygen Therapy): room air      Intake/Output Summary (Last 24 hours) at 9/9/2022 0835  Last data filed at 9/9/2022 0500  Gross per 24 hour   Intake 1820 ml   Output 840 ml   Net 980 ml         Lines/Drains/Airways       Drain  Duration                  Chest Tube 09/05/22 1008 Left Mediastinal 24 Fr. 3 days         Chest Tube 09/05/22 1008 Right Mediastinal 24 Fr. 3 days              Peripheral Intravenous Line  Duration                   Peripheral IV - Single Lumen 09/03/22 0230 20 G Left;Posterior Wrist 6 days                    Significant Labs:   Recent Results (from the past 72 hour(s))   Transesophageal echo (VARUN)    Collection Time: 09/06/22  9:52 AM   Result Value Ref Range    BSA 1.75 m2   POCT glucose    Collection Time: 09/06/22  5:10 PM   Result Value Ref Range    POCT Glucose 118 (H) 70 - 110 mg/dL   POCT glucose    Collection Time: 09/06/22 11:21 PM   Result Value Ref Range    POCT Glucose 107 70 - 110 mg/dL   Basic metabolic panel    Collection Time: 09/07/22  1:34 AM   Result Value Ref Range    Sodium Level 137 136 - 145 mmol/L    Potassium Level 3.9 3.5 - 5.1 mmol/L    Chloride 108 (H) 98 - 107 mmol/L    Carbon Dioxide 23 23 - 31 mmol/L    Glucose Level 116 (H) 82 - 115 mg/dL    Blood Urea Nitrogen 8.7 8.4 - 25.7 mg/dL    Creatinine 0.72 (L) 0.73 - 1.18 mg/dL    BUN/Creatinine Ratio 12     Calcium Level Total 9.0 8.8 - 10.0 mg/dL    Anion Gap 6.0 mEq/L    eGFR >60 mls/min/1.73/m2   CBC with Differential    Collection Time: 09/07/22  1:34 AM   Result Value Ref Range    WBC 11.5 4.5 - 11.5 x10(3)/mcL    RBC 3.42 (L) 4.70 - 6.10 x10(6)/mcL    Hgb 10.1 (L) 14.0 - 18.0 gm/dL    Hct 32.6 (L) 42.0 - 52.0 %    MCV 95.3 (H) 80.0 - 94.0 fL    MCH 29.5 27.0 - 31.0 pg    MCHC 31.0 (L) 33.0 - 36.0 mg/dL    RDW 14.1 11.5 - 17.0 %    Platelet 216 130 - 400 x10(3)/mcL    MPV 11.1 (H) 7.4 - 10.4 fL    IG# 0.07 (H) 0 - 0.04 x10(3)/mcL    IG% 0.6 %    NRBC% 0.0 %   Manual Differential    Collection Time: 09/07/22  1:34 AM   Result Value Ref Range    Neut Man 91 %    Lymph Man 6 %    Monocyte Man 3 %    Instr WBC 11.5 x10(3)/mcL    Abs Mono 0.345 0.1 - 1.3 x10(3)/mcL    Abs Lymp 0.69 0.6 - 4.6 x10(3)/mcL    Abs Neut 10.465 (H) 2.1 - 9.2 x10(3)/mcL    RBC Morph Abnormal (A) Normal    Poik 1+ (A) (none)    Luis Cells 1+ (A) (none)    Platelet Est Adequate Normal, Adequate   POCT glucose    Collection Time: 09/07/22 10:45 AM   Result Value Ref Range     POCT Glucose 105 70 - 110 mg/dL   POCT glucose    Collection Time: 09/07/22  5:32 PM   Result Value Ref Range    POCT Glucose 87 70 - 110 mg/dL   Comprehensive Metabolic Panel    Collection Time: 09/08/22  1:39 AM   Result Value Ref Range    Sodium Level 139 136 - 145 mmol/L    Potassium Level 3.6 3.5 - 5.1 mmol/L    Chloride 108 (H) 98 - 107 mmol/L    Carbon Dioxide 21 (L) 23 - 31 mmol/L    Glucose Level 94 82 - 115 mg/dL    Blood Urea Nitrogen 11.5 8.4 - 25.7 mg/dL    Creatinine 0.73 0.73 - 1.18 mg/dL    Calcium Level Total 9.1 8.8 - 10.0 mg/dL    Protein Total 5.8 5.8 - 7.6 gm/dL    Albumin Level 2.8 (L) 3.4 - 4.8 gm/dL    Globulin 3.0 2.4 - 3.5 gm/dL    Albumin/Globulin Ratio 0.9 (L) 1.1 - 2.0 ratio    Bilirubin Total 0.6 <=1.5 mg/dL    Alkaline Phosphatase 54 40 - 150 unit/L    Alanine Aminotransferase 16 0 - 55 unit/L    Aspartate Aminotransferase 17 5 - 34 unit/L    eGFR >60 mls/min/1.73/m2   Magnesium    Collection Time: 09/08/22  1:39 AM   Result Value Ref Range    Magnesium Level 1.90 1.60 - 2.60 mg/dL   POCT glucose    Collection Time: 09/08/22 11:50 AM   Result Value Ref Range    POCT Glucose 86 70 - 110 mg/dL   POCT glucose    Collection Time: 09/08/22  5:47 PM   Result Value Ref Range    POCT Glucose 126 (H) 70 - 110 mg/dL   Magnesium    Collection Time: 09/09/22  4:57 AM   Result Value Ref Range    Magnesium Level 2.10 1.60 - 2.60 mg/dL   Comprehensive Metabolic Panel    Collection Time: 09/09/22  4:57 AM   Result Value Ref Range    Sodium Level 141 136 - 145 mmol/L    Potassium Level 3.7 3.5 - 5.1 mmol/L    Chloride 109 (H) 98 - 107 mmol/L    Carbon Dioxide 24 23 - 31 mmol/L    Glucose Level 100 82 - 115 mg/dL    Blood Urea Nitrogen 10.8 8.4 - 25.7 mg/dL    Creatinine 0.70 (L) 0.73 - 1.18 mg/dL    Calcium Level Total 8.9 8.8 - 10.0 mg/dL    Protein Total 5.9 5.8 - 7.6 gm/dL    Albumin Level 2.6 (L) 3.4 - 4.8 gm/dL    Globulin 3.3 2.4 - 3.5 gm/dL    Albumin/Globulin Ratio 0.8 (L) 1.1 - 2.0 ratio     Bilirubin Total 0.5 <=1.5 mg/dL    Alkaline Phosphatase 67 40 - 150 unit/L    Alanine Aminotransferase 15 0 - 55 unit/L    Aspartate Aminotransferase 13 5 - 34 unit/L    eGFR >60 mls/min/1.73/m2   CBC with Differential    Collection Time: 09/09/22  4:57 AM   Result Value Ref Range    WBC 8.8 4.5 - 11.5 x10(3)/mcL    RBC 3.35 (L) 4.70 - 6.10 x10(6)/mcL    Hgb 9.9 (L) 14.0 - 18.0 gm/dL    Hct 30.1 (L) 42.0 - 52.0 %    MCV 89.9 80.0 - 94.0 fL    MCH 29.6 27.0 - 31.0 pg    MCHC 32.9 (L) 33.0 - 36.0 mg/dL    RDW 13.8 11.5 - 17.0 %    Platelet 332 130 - 400 x10(3)/mcL    MPV 10.3 7.4 - 10.4 fL    IG# 0.05 (H) 0 - 0.04 x10(3)/mcL    IG% 0.6 %    NRBC% 0.0 %   Manual Differential    Collection Time: 09/09/22  4:57 AM   Result Value Ref Range    Neut Man 73 %    Lymph Man 16 %    Monocyte Man 9 %    Eos Man 2 %    Basophil Man 1 %    Instr WBC 9 x10(3)/mcL    Abs Mono 0.81 0.1 - 1.3 x10(3)/mcL    Abs Eos  0.18 0 - 0.9 x10(3)/mcL    Abs Baso 0.09 0 - 0.2 x10(3)/mcL    Abs Lymp 1.44 0.6 - 4.6 x10(3)/mcL    Abs Neut 6.57 2.1 - 9.2 x10(3)/mcL    RBC Morph Normal Normal    Platelet Est Normal Normal, Adequate       Telemetry: Sinus Rhythm 90's    Physical Exam  Vitals reviewed.   Constitutional:       Appearance: Normal appearance.   HENT:      Head: Normocephalic.      Mouth/Throat:      Mouth: Mucous membranes are moist.      Pharynx: Oropharynx is clear.   Cardiovascular:      Rate and Rhythm: Normal rate and regular rhythm.      Pulses: Normal pulses.      Heart sounds: Normal heart sounds. No murmur heard.  Pulmonary:      Effort: Pulmonary effort is normal. No respiratory distress.      Breath sounds: Normal breath sounds.   Abdominal:      General: Bowel sounds are normal. There is no distension.      Palpations: Abdomen is soft.      Tenderness: There is no abdominal tenderness.   Musculoskeletal:         General: Normal range of motion.      Cervical back: Neck supple.      Right lower leg: No edema.      Left lower leg:  No edema.      Comments: Chest Tube in Place   Skin:     General: Skin is warm and dry.      Comments: Mid Sternal Incision FRANCISCA.   Neurological:      General: No focal deficit present.      Mental Status: He is alert and oriented to person, place, and time. Mental status is at baseline.   Psychiatric:         Mood and Affect: Mood normal.         Behavior: Behavior normal.         Judgment: Judgment normal.       Current Inpatient Medications:    Current Facility-Administered Medications:     0.9%  NaCl infusion (for blood administration), , Intravenous, Q24H PRN, ITA Jerez    0.9%  NaCl infusion (for blood administration), , Intravenous, Q24H PRN, ITA Jerez    0.9%  NaCl infusion (for blood administration), , Intravenous, Q24H PRN, Adolfo Coleman MD    acetaminophen oral solution 650 mg, 650 mg, Per OG tube, Q6H PRN, Ross Gutierrez PA-C    acetaminophen tablet 1,000 mg, 1,000 mg, Oral, Q6H PRN, Yomi Milligan MD, 1,000 mg at 08/31/22 1934    acetaminophen tablet 650 mg, 650 mg, Oral, Q4H PRN, Yomi Milligan MD    acetaminophen tablet 650 mg, 650 mg, Oral, Q4H PRN, Luis Daniel Maza MD, 650 mg at 09/03/22 2042    albumin human 5% bottle 12.5 g, 12.5 g, Intravenous, PRN, Ross Gutierrez PA-C, Stopped at 09/05/22 1621    aluminum-magnesium hydroxide-simethicone 200-200-20 mg/5 mL suspension 30 mL, 30 mL, Oral, QID PRN, Yomi Milligan MD    aspirin EC tablet 81 mg, 81 mg, Oral, Daily, Kallie Mendez, FNP, 81 mg at 09/09/22 0811    atorvastatin tablet 40 mg, 40 mg, Oral, QHS, Bahman Riojas MD, 40 mg at 09/08/22 2105    benzonatate capsule 200 mg, 200 mg, Oral, TID PRN, Yomi Milligan MD    cefazolin (ANCEF) 2 gram in dextrose 5% 50 mL IVPB (premix), 2 g, Intravenous, On Call Procedure, ITA Jerez    clopidogreL tablet 75 mg, 75 mg, Oral, Daily, Ross Gutierrez PA-C, 75 mg at 09/09/22 0811    dextromethorphan-guaiFENesin  mg/5 ml liquid 5 mL, 5 mL, Oral, Q4H PRN, Yomi Milligan MD    dextrose 10% bolus 125 mL,  12.5 g, Intravenous, PRN, Ross Gutierrez PA-C    dextrose 10% bolus 250 mL, 25 g, Intravenous, PRN, Ross Gutierrez PA-C    docusate sodium capsule 100 mg, 100 mg, Oral, BID, Ross Gutierrez PA-C, 100 mg at 09/09/22 0811    enoxaparin injection 40 mg, 40 mg, Subcutaneous, Daily, Adolfo Coleman MD, 40 mg at 09/08/22 1634    famotidine tablet 20 mg, 20 mg, Oral, BID, Yomi Milligan MD, 20 mg at 09/09/22 0811    folic acid tablet 1 mg, 1 mg, Oral, Daily, Ross Gutierrez PA-C, 1 mg at 09/09/22 0811    HYDROcodone-acetaminophen 5-325 mg per tablet 1 tablet, 1 tablet, Oral, Q4H PRN, Ross Gutierrez PA-C, 1 tablet at 09/08/22 1407    iopamidoL (ISOVUE-370) injection, , , PRN, Luis Daniel Maza MD, 100 mL at 08/31/22 1635    lactulose 10 gram/15 ml solution 20 g, 20 g, Oral, Q6H PRN, Ross Gutierrez PA-C    LIDOcaine HCL 10 mg/ml (1%) injection, , , PRN, Luis Daniel Maza MD, 10 mL at 08/31/22 1546    [START ON 9/10/2022] lisinopriL tablet 10 mg, 10 mg, Oral, Daily, SHELBY Cruz    loperamide capsule 4 mg, 4 mg, Oral, Once, Ross Gutierrez PA-C    magnesium sulfate 2g in water 50mL IVPB (premix), 2 g, Intravenous, PRN, Ross Gutierrez PA-C    magnesium sulfate 2g in water 50mL IVPB (premix), 4 g, Intravenous, PRN, Ross Gutierrez PA-C    melatonin tablet 6 mg, 6 mg, Oral, Nightly PRN, Yomi Milligan MD    metoclopramide HCl injection 5 mg, 5 mg, Intravenous, Q6H PRN, Ross Gutierrez PA-C    metoprolol tartrate (LOPRESSOR) tablet 25 mg, 25 mg, Oral, BID, SHELBY Cruz, 25 mg at 09/09/22 0811    mupirocin 2 % ointment, , Nasal, On Call Procedure, ITA Jerez    mupirocin 2 % ointment, , Nasal, BID, Ross Gutierrez PA-C, Given at 09/09/22 0812    nitroGLYCERIN injection, , , PRN, Luis Daniel Maza MD, 200 mcg at 08/31/22 1603    nitroGLYCERIN SL tablet 0.4 mg, 0.4 mg, Sublingual, Q5 Min PRN, Yomi Milligan MD    ondansetron disintegrating tablet 8 mg, 8 mg, Oral, Q8H PRN, Luis Daniel Maza MD    ondansetron  injection 4 mg, 4 mg, Intravenous, Q4H PRN, Yomi Milligan MD, 4 mg at 09/05/22 2122    ondansetron injection 4 mg, 4 mg, Intravenous, Q12H PRN, Ross Gutierrez PA-C    oxyCODONE immediate release tablet 5 mg, 5 mg, Oral, Q4H PRN, Ross Gutierrez PA-C, 5 mg at 09/06/22 2135    polyethylene glycol packet 17 g, 17 g, Oral, BID PRN, Yomi Milligan MD    potassium chloride SA CR tablet 20 mEq, 20 mEq, Oral, Once, Kallie Mendez, FNP    prochlorperazine injection Soln 5 mg, 5 mg, Intravenous, Q6H PRN, Yomi Milligan MD    senna-docusate 8.6-50 mg per tablet 1 tablet, 1 tablet, Oral, BID PRN, Yomi Milligan MD    simethicone chewable tablet 80 mg, 1 tablet, Oral, QID PRN, Yomi Milligan MD    sodium chloride 0.9% flush 10 mL, 10 mL, Intravenous, PRN, Yomi Milligan MD    sodium chloride 0.9% flush 10 mL, 10 mL, Intravenous, PRN, Mouna Rm, ARLETP    sodium phosphate 15 mmol in dextrose 5 % 250 mL IVPB, 15 mmol, Intravenous, PRN, Ross Gutierrez PA-C    sodium phosphate 20.01 mmol in dextrose 5 % 250 mL IVPB, 20.01 mmol, Intravenous, PRN, Ross Gutierrez PA-C    sodium phosphate 30 mmol in dextrose 5 % 250 mL IVPB, 30 mmol, Intravenous, PRN, Ross Gutierrez PA-C    sucralfate tablet 1 g, 1 g, Oral, QID (AC & HS), Ross Gutierrez PA-C, 1 g at 09/09/22 0811    verapamiL injection, , , PRN, Luis Daniel Maza MD, 2.5 mg at 08/31/22 1548    VTE Risk Mitigation (From admission, onward)           Ordered     enoxaparin injection 40 mg  Daily         09/06/22 1722     IP VTE LOW RISK PATIENT  Once         08/30/22 2316     Place sequential compression device  Until discontinued         08/30/22 2316                  Assessment:   CAD (Multi Vessel)    - Left main-normal; LAD-99% prox, mid, large diagonal occluded with circumflex collaterals; Left CX-50% mid, IFR not significant; RCA-dominant, 80% distal involving PDA and PLB; EDP 15 mm HG.    - CABG x 2 (9.5.22): LIMA - LAD, Reverse saphenous to PDA  NSTEMI -Type I  Acute  Systolic/Diastolic Heart Failure (Compensated)    - EF is 42%    - Grade I DD    - regional wall motion abnormalities of the LAD and RCA territories  Recent COVID 19 Infection (Repeat Testing Negative) (Asymptomatic)  Pre DM  Anemia (Stable)  No Hx of GIB    Plan:   Continue Current Cardiac Medications Including Aspirin/Statin/Plavix/Metoprolol/Statin/ACEI.  Increase Lisinopril to 10 Mg Daily with Hold Parameters  Advance Mobilization &  Continue Regular IS Usage  Replete K+  Will prescribe sublingual Nitro to have at discharge  Follow up with [facility]- [place]  Will be available. Please call if needed.    Kallie Mendez, SHELBY  Cardiology  [facility]   09/09/2022

## 2022-09-09 NOTE — PROGRESS NOTES
[facility] Hospital Medicine Progress Note        Chief Complaint: Inpatient Follow-up    HPI:   66-year-old male with significant history of former tobacco use, CAD, prediabetes presented to outlying facility on 08/30 with complaints of chest pain-typical for cardiac origin, left-sided with associated diaphoresis.  Upon initial evaluation in the ED patient was hemodynamically stable.  Troponins elevated.  Also had transaminitis and he tested positive for COVID-19.  UDS positive for marijuana.  EKG with ST T-wave changes . CIS was consulted and the patient was transferred to our hospital.  Patient underwent LHC which revealed multivessel CAD.  CV surgery was consulted.  Patient underwent CABG on 09/05 and was admitted to ICU for closer monitoring post CABG.  Echocardiogram revealed ejection fraction-42% along with grade 1 diastolic dysfunction.  Patient downgraded to hospitalist service later.  Cardiology continues to closely follow the patient.  Chest tube management post CABG per CV surgery.  Therapy Services on board.  Patient tolerating well.  Interval Hx:   Patient seen at bedside.  Comfortably laying in bed.  No new complaints.  Hemodynamics stable.  No acute events overnight.    Objective/physical exam:  General: In no acute distress, afebrile  Chest: Clear to auscultation bilaterally, chest tube in place  Heart: S1, S2, no appreciable murmur  Abdomen: Soft, nontender, BS +  MSK: Warm, no lower extremity edema, no clubbing or cyanosis  Neurologic: Alert and oriented x4, moving all extremities with good strength     VITAL SIGNS: 24 HRS MIN & MAX LAST   Temp  Min: 98 °F (36.7 °C)  Max: 98.8 °F (37.1 °C) 98 °F (36.7 °C)   BP  Min: 103/68  Max: 149/84 116/79   Pulse  Min: 79  Max: 98  94   Resp  Min: 17  Max: 20 20   SpO2  Min: 94 %  Max: 100 % 95 %       Recent Labs   Lab 09/09/22  0457   WBC 8.8   RBC 3.35*   HGB 9.9*   HCT 30.1*   MCV 89.9   MCH 29.6   MCHC 32.9*   RDW 13.8   PLT  332   MPV 10.3         Recent Labs   Lab 09/05/22  1443 09/05/22  1544 09/09/22  0457   NA  --    < > 141   K  --    < > 3.7   CO2  --    < > 24   BUN  --    < > 10.8   CREATININE  --    < > 0.70*   CALCIUM  --    < > 8.9   PH 7.37  --   --    MG  --    < > 2.10   ALBUMIN  --    < > 2.6*   ALKPHOS  --    < > 67   ALT  --    < > 15   AST  --    < > 13   BILITOT  --    < > 0.5    < > = values in this interval not displayed.          Microbiology Results (last 7 days)       Procedure Component Value Units Date/Time    Respiratory Culture [[id number]] Collected: 09/04/22 1047    Order Status: Completed Specimen: Nasopharyngeal Swab Updated: 09/06/22 0738     Respiratory Culture Normal respiratory joey    MRSA Screen by PCR [[id number]] Collected: 09/04/22 1047    Order Status: Canceled Specimen: Nasopharyngeal Swab from Nasal Updated: 09/04/22 1053             Scheduled Med:   aspirin  81 mg Oral Daily    atorvastatin  40 mg Oral QHS    clopidogreL  75 mg Oral Daily    docusate sodium  100 mg Oral BID    enoxaparin  40 mg Subcutaneous Daily    famotidine  20 mg Oral BID    folic acid  1 mg Oral Daily    [START ON 9/10/2022] lisinopriL  10 mg Oral Daily    loperamide  4 mg Oral Once    metoprolol tartrate  25 mg Oral BID    mupirocin   Nasal BID    potassium chloride  20 mEq Oral Once    sucralfate  1 g Oral QID (AC & HS)          Assessment/Plan:    NSTEMI type 1   Multivessel CAD status post CABG x2 09/05/2022   Acute decompensated systolic/diastolic heart failure with ejection fraction 45%, now compensated  Asymptomatic COVID-19 infection   Substance use disorder   Former tobacco abuse  Anemia of chronic disease  Transaminitis on admit-improved  Prophylaxis    Patient doing well post CABG   Chest tubes to be removed today by CV surgery   Hemodynamics and labs stable except for anemia which is chronic  Cardiology on board, medication optimization per Cardiology   Patient is now on dual anti-platelet, statin, lisinopril,  Toprol XL  Therapy Services on board   Continue other home meds-bowel regimen, Pepcid, Carafate, folic acid  DVT prophylaxis-Lovenox    Will await final decision from PT/OT as far as discharge disposition   If cleared for home he should be able to go home tomorrow      Emily Henriquez MD   09/09/2022

## 2022-09-09 NOTE — PROGRESS NOTES
09/09/22 0915   Pre Exercise Vitals   /69   Pulse 89   Supplemental O2? No   SpO2 96 %   During Exercise Vitals   Pulse 95   Supplemental O2? No   SpO2 92 %  (ranges 88-98 during ambulation)   Post Exercise Vitals   /85   Pulse 87   Supplemental O2? No   SpO2 95 %   Modality   Modality   (rollator)   Communicated with nurse prior to activity.  Minimal assist.  Sternal precautions reviewed and maintained.  Encouraged increased activity and use of IS.  Up in chair post ambulation.

## 2022-09-09 NOTE — PT/OT/SLP PROGRESS
Physical Therapy  Treatment    Gustavo Cheung   MRN: [id number]   Admitting Diagnosis: Type 1 non-ST elevation myocardial infarction (NSTEMI)       PT Start Time: 1100     PT Stop Time: 1124    PT Total Time (min): 24 min       Billable Minutes:  Gait Training 12 and Therapeutic Activity 12    Treatment Type: Treatment  PT/PTA: PTA     PTA Visit Number: 1       General Precautions: Standard, sternal  Orthopedic Precautions:     Braces:    Respiratory Status: Room air    Spiritual, Cultural Beliefs, Rastafarian Practices, Values that Affect Care: no    Subjective:  Communicated with NSG prior to session.         Objective:        Functional Mobility:      Transfers:   Sit to/from stand x 5 CGA. Sternal pxns maintained.    Gait:    Pt ambulated ~150ft x 2 bouts. Step through gait pattern with RW. Slow but steady gait pattern. CGA.       AM-PAC 6 CLICK MOBILITY  How much help from another person does this patient currently need?   1 = Unable, Total/Dependent Assistance  2 = A lot, Maximum/Moderate Assistance  3 = A little, Minimum/Contact Guard/Supervision  4 = None, Modified [place]/Independent         AM-PAC Raw Score CMS G-Code Modifier Level of Impairment Assistance   6 % Total / Unable   7 - 9 CM 80 - 100% Maximal Assist   10 - 14 CL 60 - 80% Moderate Assist   15 - 19 CK 40 - 60% Moderate Assist   20 - 22 CJ 20 - 40% Minimal Assist   23 CI 1-20% SBA / CGA   24 CH 0% Independent/ Mod I     Patient left up in chair with all lines intact and call button in reach.    Assessment:  Gustavo Cheung is a 67 y.o. male with a medical diagnosis of Type 1 non-ST elevation myocardial infarction (NSTEMI) and presents with s/p CABG.    Rehab identified problem list/impairments: Rehab identified problem list/impairments: weakness, gait instability, impaired endurance, impaired balance    Rehab potential is good.    Activity tolerance: Good    Discharge recommendations:   Inpatient rehab v Home with HH    Barriers to  discharge:      Equipment recommendations:       GOALS:   Multidisciplinary Problems       Physical Therapy Goals          Problem: Physical Therapy    Goal Priority Disciplines Outcome Goal Variances Interventions   Physical Therapy Goal     PT, PT/OT Ongoing, Progressing     Description: Goals to be met by: 10/8/2022     Patient will increase functional independence with mobility by performin. Supine to sit with [place]  2. Sit to stand transfer with [place]  3. Gait  x 200 feet with Modified [place] using rollator.   4. Ascend/descend 3 stair with no Handrails [place] using No Assistive Device.                          PLAN:    Patient to be seen 5 x/week  to address the above listed problems via gait training, therapeutic activities, therapeutic exercises  Plan of Care expires: 10/08/22  Plan of Care reviewed with: patient         2022

## 2022-09-09 NOTE — PROGRESS NOTES
[facility] Hospital Medicine Progress Note        Chief Complaint: Inpatient Follow-up for NSTEMI    HPI: This is a 66-year-old male with medical history of former cigarette smoker quit 2016 present to [facility] ED with complaint of chest pain that started  today 8/30/2022 around 10:30 AM.  Described the pain as pressure-like substernal, nonradiating and associated with diaphoresis, pain lasted for about 1 hour and improved with sublingual nitro spray given with EMS and again in the emergency room.  On arrival he was noted to be hemodynamically stable and afebrile and saturating above 95% on room air. EKG  sinus rhythm and showed T-wave inversion lateral leads. Chest x-ray showed normal cardiac silhouette and no parenchymal lung opacities. Labs notable for troponin 1.078,  elevated transaminases and COVID-19 positive.  He was given nitro paste and currently is chest pain-free.  Cardiology consulted and transferred to [facility] and referred to hospital medicine service for further evaluation and management.    Interval Hx:   Resting , postop CABG day 3  Tolerating PT  Chest tube in place   No family at bedside      Objective/physical exam:  General: In no acute distress, afebrile  Chest:  Decreased entry bilaterally due to limitation from surgery.  Chest tube present  Heart: RRR, +S1, S2, no appreciable murmur.  Dressing over sternotomy  Abdomen: Soft, nontender, BS +  MSK: Warm, no lower extremity edema, no clubbing or cyanosis  Neurologic: Alert and oriented x4, Cranial nerve II-XII intact, Strength 5/5 in all 4 extremities    VITAL SIGNS: 24 HRS MIN & MAX LAST   Temp  Min: 98.4 °F (36.9 °C)  Max: 98.8 °F (37.1 °C) 98.4 °F (36.9 °C)   BP  Min: 103/68  Max: 149/84 (!) 149/84   Pulse  Min: 79  Max: 104  96   Resp  Min: 16  Max: 20 20   SpO2  Min: 92 %  Max: 100 % 100 %       Recent Labs   Lab 09/05/22  1025 09/05/22  1544 09/06/22  0123 09/07/22  0134   WBC 13.8*  --  12.0* 11.5   RBC  3.30*  --  3.47* 3.42*   HGB 9.7* 9.3* 10.4* 10.1*   HCT 31.3* 29.6* 32.0* 32.6*   MCV 94.8*  --  92.2 95.3*   MCH 29.4  --  30.0 29.5   MCHC 31.0*  --  32.5* 31.0*   RDW 13.6  --  13.9 14.1     --  251 216   MPV 11.2*  --  11.0* 11.1*         Recent Labs   Lab 09/02/22  0401 09/03/22  0422 09/05/22  0350 09/05/22  1025 09/05/22  1155 09/05/22  1443 09/05/22  1544 09/06/22  0123 09/07/22  0134 09/08/22  0139    141 139   < >  --   --   --  138 137 139   K 4.0 4.6 4.3   < >  --   --    < > 4.5 3.9 3.6   CO2 21* 24 22*   < >  --   --   --  24 23 21*   BUN 11.9 13.4 13.9   < >  --   --   --  9.0 8.7 11.5   CREATININE 0.75 0.82 0.81   < >  --   --   --  0.89 0.72* 0.73   CALCIUM 9.4 9.5 9.8   < >  --   --   --  8.9 9.0 9.1   PH  --   --   --   --  7.36 7.37  --   --   --   --    MG 2.20  --  2.30  --   --   --   --   --   --  1.90   ALBUMIN 3.9 3.8 3.9  --   --   --   --   --   --  2.8*   ALKPHOS 48 50 55  --   --   --   --   --   --  54   ALT 53 47 35  --   --   --   --   --   --  16   AST 25 21 17  --   --   --   --   --   --  17   BILITOT 0.4 0.4 0.3  --   --   --   --   --   --  0.6    < > = values in this interval not displayed.            Microbiology Results (last 7 days)       Procedure Component Value Units Date/Time    Respiratory Culture [[id number]] Collected: 09/04/22 1047    Order Status: Completed Specimen: Nasopharyngeal Swab Updated: 09/06/22 0738     Respiratory Culture Normal respiratory joey    MRSA Screen by PCR [[id number]] Collected: 09/04/22 1047    Order Status: Canceled Specimen: Nasopharyngeal Swab from Nasal Updated: 09/04/22 1053             See below for Radiology    Scheduled Med:   [START ON 9/9/2022] aspirin  81 mg Oral Daily    atorvastatin  40 mg Oral QHS    clopidogreL  75 mg Oral Daily    docusate sodium  100 mg Oral BID    enoxaparin  40 mg Subcutaneous Daily    famotidine  20 mg Oral BID    folic acid  1 mg Oral Daily    lisinopriL  5 mg Oral Daily    loperamide  4 mg  Oral Once    metoprolol tartrate  25 mg Oral BID    mupirocin   Nasal BID    sucralfate  1 g Oral QID (AC & HS)        Continuous Infusions:         PRN Meds:  sodium chloride, sodium chloride, sodium chloride, acetaminophen, acetaminophen, acetaminophen, acetaminophen, albumin human 5%, aluminum-magnesium hydroxide-simethicone, benzonatate, ceFAZolin (ANCEF) IVPB, dextromethorphan-guaiFENesin  mg/5 ml, dextrose 10%, dextrose 10%, HYDROcodone-acetaminophen, iopamidoL, lactulose 10 gram/15 ml, LIDOcaine HCL 10 mg/ml (1%), magnesium sulfate IVPB, magnesium sulfate IVPB, melatonin, metoclopramide HCl, mupirocin, nitroGLYCERIN, nitroGLYCERIN, ondansetron, ondansetron, ondansetron, oxyCODONE, polyethylene glycol, prochlorperazine, senna-docusate 8.6-50 mg, simethicone, sodium chloride 0.9%, sodium chloride 0.9%, sodium phosphate IVPB, sodium phosphate IVPB, sodium phosphate IVPB, verapamiL       Assessment/Plan:  NSTEMI--> Multivessel disease-->  status post CABG 9/5  New acute systolic heart failure with EF 42% and grade 1 diastolic dysfunction  COVID 19 postive- no respiratory symptoms at this time  Hyperlipidemia  Transaminitis- resolved  -Fatty liver  -hep panel negative     8/31- Underwent [facility] --> multivessel disease, consulted cardiothoracic surgery  Underwent CABG 09/05/2022 with Dr. Coleman    Cont Metoprolol succinate 12.5 mg daily, aspirin 81 mg daily  atorvastatin resumed          Maintain covid isolation and precautions per CDC guideline        Morning CBC, CMP stable      VTE prophylaxis:  Lovenox 40 mg subQ daily    Patient condition:  Stable    Anticipated discharge and Disposition:   TBD        All diagnosis and differential diagnosis have been reviewed; assessment and plan has been documented; I have personally reviewed the labs and test results that are presently available; I have reviewed the patients medication list; I have reviewed the consulting providers response and recommendations. I have  reviewed or attempted to review medical records based upon their availability    All of the patient's questions have been  addressed and answered. Patient's is agreeable to the above stated plan. I will continue to monitor closely and make adjustments to medical management as needed.  _____________________________________________________________________    Nutrition Status:    Radiology:  X-Ray Chest AP Portable  Narrative: EXAMINATION:  XR CHEST AP PORTABLE    CLINICAL HISTORY:  Post-op;    TECHNIQUE:  Single frontal view of the chest was performed.    COMPARISON:  September 6, 2022    FINDINGS:  Cardiomediastinal silhouette and pleuroparenchymal changes are essentially unchanged as compared with the previous exam.    Central line has been removed  Impression: Interval removal of central line.    No other interval change    Electronically signed by: Jameel Bravo  Date:    09/08/2022  Time:    08:22      Frank Conklin MD   09/08/2022

## 2022-09-09 NOTE — PT/OT/SLP PROGRESS
Occupational Therapy  Treatment    Gustavo Cheung   MRN: [id number]   Admitting Diagnosis: Type 1 non-ST elevation myocardial infarction (NSTEMI)    OT Date of Treatment: 09/09/22   OT Start Time: 1022  OT Stop Time: 1049  OT Total Time (min): 27 min     Billable Minutes:  Self Care/Home Management 27  Total Minutes: 27     OT/FRANCISCA: FRANCISCA     FRANCISCA Visit Number: 1    General Precautions: Standard, fall, sternal  Orthopedic Precautions:    Braces:      Spiritual, Cultural Beliefs, Tenriism Practices, Values that Affect Care: no    Subjective:  Communicated with RN prior to session.         Objective:   UE Dressing:  Patient performed UE Dressing with Stand-by Assistance with education provided to maintain sternal pxns during donning/doffing pull over shirt at sitting in chair.    LE Dressing:  Patient don/doffed socks with Independent    Toilet Training:  Patient simulating toileting activity for pericare while maintaining sternal pxns sitting in chair.    Patient left up in chair with all lines intact and call button in reach    ASSESSMENT:  Gustavo Cheung is a 67 y.o. male with a medical diagnosis of Type 1 non-ST elevation myocardial infarction (NSTEMI).    Rehab potential is  no indicated    Activity tolerance: Excellent    Discharge recommendations: home with home health, home     Equipment recommendations:       GOALS:   Multidisciplinary Problems       Occupational Therapy Goals          Problem: Occupational Therapy    Goal Priority Disciplines Outcome Interventions   Occupational Therapy Goal     OT, PT/OT Ongoing, Progressing    Description: Goals to be met by: 9/22/22    Patient will increase functional independence with ADLs by performing:    LE Dressing with Modified [place].  Grooming while standing at sink with Modified [place].  Toileting from toilet with Modified [place] for hygiene and clothing management.                          Plan:  Patient to be seen 5 x/week, 6 x/week, daily to  address the above listed problems via self-care/home management, therapeutic activities, therapeutic exercises  Plan of Care expires: 09/22/22  Plan of Care reviewed with: patient         09/09/2022

## 2022-09-09 NOTE — PLAN OF CARE
Problem: Adult Inpatient Plan of Care  Goal: Plan of Care Review  Outcome: Ongoing, Progressing  Goal: Patient-Specific Goal (Individualized)  Outcome: Ongoing, Progressing  Goal: Absence of Hospital-Acquired Illness or Injury  Outcome: Ongoing, Progressing  Goal: Optimal Comfort and Wellbeing  Outcome: Ongoing, Progressing  Goal: Readiness for Transition of Care  Outcome: Ongoing, Progressing     Problem: Chest Pain  Goal: Resolution of Chest Pain Symptoms  Outcome: Ongoing, Progressing     Problem: Infection  Goal: Absence of Infection Signs and Symptoms  Outcome: Ongoing, Progressing     Problem: Fall Injury Risk  Goal: Absence of Fall and Fall-Related Injury  Outcome: Ongoing, Progressing     Problem: Skin Injury Risk Increased  Goal: Skin Health and Integrity  Outcome: Ongoing, Progressing     Problem: Skin and Tissue Injury (Mechanical Ventilation, Invasive)  Goal: Absence of Device-Related Skin and Tissue Injury  Outcome: Ongoing, Progressing     Problem: Skin and Tissue Injury (Artificial Airway)  Goal: Absence of Device-Related Skin or Tissue Injury  Outcome: Ongoing, Progressing

## 2022-09-10 VITALS
HEART RATE: 91 BPM | SYSTOLIC BLOOD PRESSURE: 132 MMHG | DIASTOLIC BLOOD PRESSURE: 77 MMHG | HEIGHT: 64 IN | TEMPERATURE: 98 F | RESPIRATION RATE: 18 BRPM | WEIGHT: 161.38 LBS | OXYGEN SATURATION: 93 % | BODY MASS INDEX: 27.55 KG/M2

## 2022-09-10 PROCEDURE — 25000003 PHARM REV CODE 250: Performed by: INTERNAL MEDICINE

## 2022-09-10 PROCEDURE — 94760 N-INVAS EAR/PLS OXIMETRY 1: CPT

## 2022-09-10 PROCEDURE — 25000003 PHARM REV CODE 250: Performed by: NURSE PRACTITIONER

## 2022-09-10 PROCEDURE — 25000003 PHARM REV CODE 250: Performed by: PHYSICIAN ASSISTANT

## 2022-09-10 PROCEDURE — 99024 PR POST-OP FOLLOW-UP VISIT: ICD-10-PCS | Mod: POP,,, | Performed by: PHYSICIAN ASSISTANT

## 2022-09-10 PROCEDURE — 97110 THERAPEUTIC EXERCISES: CPT

## 2022-09-10 PROCEDURE — 99024 POSTOP FOLLOW-UP VISIT: CPT | Mod: POP,,, | Performed by: PHYSICIAN ASSISTANT

## 2022-09-10 RX ORDER — LISINOPRIL 10 MG/1
10 TABLET ORAL DAILY
Qty: 90 TABLET | Refills: 3 | Status: SHIPPED | OUTPATIENT
Start: 2022-09-10 | End: 2023-06-29

## 2022-09-10 RX ORDER — FOLIC ACID 1 MG/1
1 TABLET ORAL DAILY
Qty: 30 TABLET | Refills: 11 | Status: SHIPPED | OUTPATIENT
Start: 2022-09-10 | End: 2023-09-10

## 2022-09-10 RX ORDER — ATORVASTATIN CALCIUM 40 MG/1
40 TABLET, FILM COATED ORAL NIGHTLY
Qty: 90 TABLET | Refills: 3 | Status: SHIPPED | OUTPATIENT
Start: 2022-09-10 | End: 2023-11-10

## 2022-09-10 RX ORDER — ASPIRIN 81 MG/1
81 TABLET ORAL DAILY
Qty: 30 TABLET | Refills: 11 | Status: SHIPPED | OUTPATIENT
Start: 2022-09-10 | End: 2023-10-31

## 2022-09-10 RX ORDER — METOPROLOL SUCCINATE 50 MG/1
50 TABLET, EXTENDED RELEASE ORAL DAILY
Qty: 30 TABLET | Refills: 11 | Status: SHIPPED | OUTPATIENT
Start: 2022-09-10 | End: 2023-09-13

## 2022-09-10 RX ORDER — FAMOTIDINE 20 MG/1
20 TABLET, FILM COATED ORAL 2 TIMES DAILY
Qty: 60 TABLET | Refills: 11 | Status: SHIPPED | OUTPATIENT
Start: 2022-09-10 | End: 2023-10-09

## 2022-09-10 RX ORDER — CLOPIDOGREL BISULFATE 75 MG/1
75 TABLET ORAL DAILY
Qty: 30 TABLET | Refills: 11 | Status: SHIPPED | OUTPATIENT
Start: 2022-09-10 | End: 2024-03-05

## 2022-09-10 RX ORDER — HYDROCODONE BITARTRATE AND ACETAMINOPHEN 5; 325 MG/1; MG/1
1 TABLET ORAL EVERY 6 HOURS PRN
Qty: 28 TABLET | Refills: 0 | Status: SHIPPED | OUTPATIENT
Start: 2022-09-10

## 2022-09-10 RX ADMIN — CLOPIDOGREL 75 MG: 75 TABLET, FILM COATED ORAL at 10:09

## 2022-09-10 RX ADMIN — FOLIC ACID 1 MG: 1 TABLET ORAL at 10:09

## 2022-09-10 RX ADMIN — FAMOTIDINE 20 MG: 20 TABLET, FILM COATED ORAL at 10:09

## 2022-09-10 RX ADMIN — METOPROLOL SUCCINATE 50 MG: 50 TABLET, EXTENDED RELEASE ORAL at 10:09

## 2022-09-10 RX ADMIN — DOCUSATE SODIUM 100 MG: 100 CAPSULE, LIQUID FILLED ORAL at 10:09

## 2022-09-10 RX ADMIN — LISINOPRIL 10 MG: 10 TABLET ORAL at 10:09

## 2022-09-10 RX ADMIN — ASPIRIN 81 MG: 81 TABLET, COATED ORAL at 10:09

## 2022-09-10 RX ADMIN — SUCRALFATE 1 G: 1 TABLET ORAL at 05:09

## 2022-09-10 NOTE — PLAN OF CARE
09/10/22 1345   Final Note   Assessment Type Final Discharge Note   Anticipated Discharge Disposition Home-Health   Hospital Resources/Appts/Education Provided Post-Acute resouces added to AVS   Post-Acute Status   Post-Acute Authorization Home Health   Home Health Status Set-up Complete/Auth obtained  ([place])   Discharge Delays None known at this time

## 2022-09-10 NOTE — DISCHARGE SUMMARY
[facility] Hospital Medicine Discharge Summary    Admit Date: 8/30/2022  Discharge Date and Time: 9/10/23052:39 AM  Admitting Physician:  Team  Discharging Physician: Emily Henriquez MD.  Primary Care Physician: No primary care provider on file.      Discharge Diagnoses:     NSTEMI type 1   Multivessel CAD status post CABG x2 09/05/2022   Acute decompensated systolic/diastolic heart failure with ejection fraction 45%, now compensated  Asymptomatic COVID-19 infection   Substance use disorder   Former tobacco abuse  Anemia of chronic disease  Transaminitis on admit-improved    Hospital Course:   66-year-old male with significant history of former tobacco use, CAD, prediabetes presented to outlying facility on 08/30 with complaints of chest pain-typical for cardiac origin, left-sided with associated diaphoresis.  Upon initial evaluation in the ED patient was hemodynamically stable.  Troponins elevated.  Also had transaminitis and he tested positive for COVID-19.  UDS positive for marijuana.  EKG with ST T-wave changes . CIS was consulted and the patient was transferred to our hospital.  Patient underwent LHC which revealed multivessel CAD.  CV surgery was consulted.  Patient underwent CABG on 09/05 and was admitted to ICU for closer monitoring post CABG.  Echocardiogram revealed ejection fraction-42% along with grade 1 diastolic dysfunction.  Patient downgraded to hospitalist service later.  Cardiology continues to closely follow the patient.  Chest tube management post CABG per CV surgery.  Therapy Services on board.  Patient tolerating well.  Patient doing well post CABG.  Chest tube removed 9/9.  Hemodynamics stable.  Labs stable except for anemia which is chronic.  Cardiology and CV surgery following.  Patient on dual antiplatelets, statin, lisinopril and Toprol-XL.  Therapy Services on board.  They cleared him for home with home health.  No indication for inpatient rehab stay.   Cardiology and CT surgery cleared the patient.  No new complaints.  Therefore it was decided to discharge him home on 09/10.  Discharge medications per med rec.  Follow-up with PCP, Cardiology, CV surgery.  Treatment plans discussed with the patient and he voiced understanding everything explained.    Vitals:  VITAL SIGNS: 24 HRS MIN & MAX LAST   Temp  Min: 98.1 °F (36.7 °C)  Max: 98.6 °F (37 °C) 98.3 °F (36.8 °C)   BP  Min: 99/64  Max: 143/84 132/77   Pulse  Min: 80  Max: 93  91   Resp  Min: 16  Max: 19 18   SpO2  Min: 93 %  Max: 97 % (!) 93 %         Physical Exam:  General appearance:  No acute distress  HENT: Atraumatic   Lungs: Clear to auscultation bilaterally.   Heart: RRR,No edema  Abdomen: Soft, non tender   Extremities: warm  Neuro:  Awake, alert, oriented x4  Psych/mental status: Appropriate mood and affect.      Procedures Performed: No admission procedures for hospital encounter.     Significant Diagnostic Studies: See Full reports for all details    Recent Labs   Lab 09/06/22  0123 09/07/22  0134 09/09/22 0457   WBC 12.0* 11.5 8.8   RBC 3.47* 3.42* 3.35*   HGB 10.4* 10.1* 9.9*   HCT 32.0* 32.6* 30.1*   MCV 92.2 95.3* 89.9   MCH 30.0 29.5 29.6   MCHC 32.5* 31.0* 32.9*   RDW 13.9 14.1 13.8    216 332   MPV 11.0* 11.1* 10.3       Recent Labs   Lab 09/05/22  0350 09/05/22  1025 09/05/22  1155 09/05/22  1443 09/05/22  1544 09/07/22  0134 09/08/22  0139 09/09/22  0457      < >  --   --    < > 137 139 141   K 4.3   < >  --   --    < > 3.9 3.6 3.7   CO2 22*   < >  --   --    < > 23 21* 24   BUN 13.9   < >  --   --    < > 8.7 11.5 10.8   CREATININE 0.81   < >  --   --    < > 0.72* 0.73 0.70*   CALCIUM 9.8   < >  --   --    < > 9.0 9.1 8.9   PH  --   --  7.36 7.37  --   --   --   --    MG 2.30  --   --   --   --   --  1.90 2.10   ALBUMIN 3.9  --   --   --   --   --  2.8* 2.6*   ALKPHOS 55  --   --   --   --   --  54 67   ALT 35  --   --   --   --   --  16 15   AST 17  --   --   --   --   --  17 13    BILITOT 0.3  --   --   --   --   --  0.6 0.5    < > = values in this interval not displayed.        Microbiology Results (last 7 days)       Procedure Component Value Units Date/Time    Respiratory Culture [[id number]] Collected: 09/04/22 1047    Order Status: Completed Specimen: Nasopharyngeal Swab Updated: 09/06/22 0738     Respiratory Culture Normal respiratory joey    MRSA Screen by PCR [[id number]] Collected: 09/04/22 1047    Order Status: Canceled Specimen: Nasopharyngeal Swab from Nasal Updated: 09/04/22 1053             X-Ray Chest PA And Lateral  Narrative: EXAMINATION:  XR CHEST PA AND LATERAL    CLINICAL HISTORY:  chest tubes d/c;, .    COMPARISON:  September 9, 2022    FINDINGS:  Cardiomediastinal silhouette and pleuroparenchymal changes are essentially unchanged as compared with the previous exam with some atelectatic changes in both bases.    There is blunting of the left costophrenic angle and both posterior sulci which might be related to small pleural effusions.    There has been interval removal of support catheters    No other abnormalities  Impression: Interval removal of support catheters with some atelectatic changes in both bases.    Blunting of the left costophrenic angle and posterior sulci representing small pleural effusions    Electronically signed by: Jameel Bravo  Date:    09/09/2022  Time:    14:27  X-Ray Chest 1 View  Narrative: EXAMINATION:  XR CHEST 1 VIEW    CPT 15392    CLINICAL HISTORY:  post op;    COMPARISON:  September 8, 2022    FINDINGS:  Cardiomediastinal silhouette and pleuroparenchymal changes are essentially unchanged as compared with the previous exam.    Support catheters remain in place  Impression: No significant change    Electronically signed by: Jameel Bravo  Date:    09/09/2022  Time:    08:31         Medication List        START taking these medications      aspirin 81 MG EC tablet  Commonly known as: ECOTRIN  Take 1 tablet (81 mg total) by mouth once  daily.     atorvastatin 40 MG tablet  Commonly known as: LIPITOR  Take 1 tablet (40 mg total) by mouth every evening.     clopidogreL 75 mg tablet  Commonly known as: PLAVIX  Take 1 tablet (75 mg total) by mouth once daily.     famotidine 20 MG tablet  Commonly known as: PEPCID  Take 1 tablet (20 mg total) by mouth 2 (two) times daily.     folic acid 1 MG tablet  Commonly known as: FOLVITE  Take 1 tablet (1 mg total) by mouth once daily.     HYDROcodone-acetaminophen 5-325 mg per tablet  Commonly known as: NORCO  Take 1 tablet by mouth every 6 (six) hours as needed for Pain.     lisinopriL 10 MG tablet  Take 1 tablet (10 mg total) by mouth once daily.     metoprolol succinate 50 MG 24 hr tablet  Commonly known as: TOPROL-XL  Take 1 tablet (50 mg total) by mouth once daily.            ASK your doctor about these medications      cardioplegic solution no.16 (DEL NIDO) 26 mEq/1,052.8 mL (potassium) Soln  Commonly known as: CARDIOPLEGIA DEL NIDO FORMULA  1 each (1,052.8 mLs total) by Other route once. for 1 dose  Ask about: Should I take this medication?               Where to Get Your Medications        These medications were sent to [id number] - [place], [place] - [address] AT [facility] & [place] [address]  [address] [place] [address]      Phone: [phone]   aspirin 81 MG EC tablet  atorvastatin 40 MG tablet  cardioplegic solution no.16 (DEL NIDO) 26 mEq/1,052.8 mL (potassium) Soln  clopidogreL 75 mg tablet  famotidine 20 MG tablet  folic acid 1 MG tablet  HYDROcodone-acetaminophen 5-325 mg per tablet  lisinopriL 10 MG tablet  metoprolol succinate 50 MG 24 hr tablet          Explained in detail to the patient about the discharge plan, medications, and follow-up visits. Pt understands and agrees with the treatment plan  Discharge Disposition: Short Term Hospital   Discharged Condition: stable  Diet-   Dietary Orders (From admission, onward)       Start     Ordered    09/06/22 8803   Diet heart healthy  Diet effective now         09/06/22 1351                   Medications Per DC med rec  Activities as tolerated   Follow-up Information       Alexi Briseno MD Follow up in 10 day(s).    Specialty: Interventional Cardiology  Why: Hospital Follow Up- [place] 10 Days.  Contact information:  [address]  [address]  [place] [address]  [phone]               PCP in 1 week Follow up.               Adolfo Coleman MD Follow up in 1 week(s).    Specialties: Cardiothoracic Surgery, Cardiology  Contact information:  [address]  [address]  [facility] [address] [phone]                           For further questions contact hospitalist office    Discharge time 33 minutes    For worsening symptoms, chest pain, shortness of breath, increased abdominal pain, high grade fever, stroke or stroke like symptoms, immediately go to the nearest Emergency Room or call 911 as soon as possible.      Emily Cook M.D, on 9/10/2022. at 8:39 AM.

## 2022-09-10 NOTE — DISCHARGE SUMMARY
[facility] 3rd Floor Medical Telemetry  Cardiothoracic Surgery  Discharge Summary      Patient Name: Gustavo Cheung  MRN: [id number]  Admission Date: 8/30/2022  Hospital Length of Stay: 10 days  Discharge Date and Time: No discharge date for patient encounter.  Attending Physician: Frank Conklin MD   Discharging Provider: Ross Gutierrez PA-C  Primary Care Provider: No primary care provider on file.    HPI:   No notes on file    Procedure(s) (LRB):  CORONARY ARTERY BYPASS GRAFT (CABG) (N/A)      Indwelling Lines/Drains at time of discharge:   Lines/Drains/Airways     None               Hospital Course: No notes on file    Goals of Care Treatment Preferences:  Code Status: Full Code      Consults (From admission, onward)        Status Ordering Provider     Inpatient consult to Social Work/Case Management  Once        Provider:  (Not yet assigned)    LEW Hoyt     Inpatient consult to Cardiothoracic Surgery  Once        Provider:  Adolfo Coleman MD    Completed LESLIE HOFFMAN     Inpatient consult to Cardiothoracic Surgery  Once        Provider:  Delio Le MD    Completed LESLIE HOFFMAN     Inpatient consult to Cardiology  Once        Provider:  Yann Gonzales MD    Completed DUONG ALVAREZ          Significant Diagnostic Studies:    Pending Diagnostic Studies:     Procedure Component Value Units Date/Time    X-Ray Chest PA And Lateral [[id number]]     Order Status: Sent Lab Status: No result           No new Assessment & Plan notes have been filed under this hospital service since the last note was generated.  Service: Cardiothoracic Surgery    Final Active Diagnoses:    Diagnosis Date Noted POA    PRINCIPAL PROBLEM:  Type 1 non-ST elevation myocardial infarction (NSTEMI) [I21.4] 09/02/2022 Yes    Stable angina pectoris [I20.8] 09/02/2022 Unknown      Problems Resolved During this Admission:      Discharged Condition: good    Disposition: Home or Self  Care    Follow Up:   Follow-up Information     Alexi Briseno MD Follow up in 10 day(s).    Specialty: Interventional Cardiology  Why: Hospital Follow Up- [place] ([place])- 10 Days.  Contact information:  [address]  [address]  [place] [address]  [phone]                       Patient Instructions:   No discharge procedures on file.  Medications:  Reconciled Home Medications:      Medication List      START taking these medications    aspirin 81 MG EC tablet  Commonly known as: ECOTRIN  Take 1 tablet (81 mg total) by mouth once daily.     atorvastatin 40 MG tablet  Commonly known as: LIPITOR  Take 1 tablet (40 mg total) by mouth every evening.     clopidogreL 75 mg tablet  Commonly known as: PLAVIX  Take 1 tablet (75 mg total) by mouth once daily.     famotidine 20 MG tablet  Commonly known as: PEPCID  Take 1 tablet (20 mg total) by mouth 2 (two) times daily.     folic acid 1 MG tablet  Commonly known as: FOLVITE  Take 1 tablet (1 mg total) by mouth once daily.     HYDROcodone-acetaminophen 5-325 mg per tablet  Commonly known as: NORCO  Take 1 tablet by mouth every 6 (six) hours as needed for Pain.     lisinopriL 10 MG tablet  Take 1 tablet (10 mg total) by mouth once daily.     metoprolol succinate 50 MG 24 hr tablet  Commonly known as: TOPROL-XL  Take 1 tablet (50 mg total) by mouth once daily.        ASK your doctor about these medications    cardioplegic solution no.16 (DEL NIDO) 26 mEq/1,052.8 mL (potassium) Soln  Commonly known as: CARDIOPLEGIA DEL NIDO FORMULA  1 each (1,052.8 mLs total) by Other route once. for 1 dose  Ask about: Should I take this medication?          Time spent on the discharge of patient: 30 minutes    Ross Gutierrez PA-C  Cardiothoracic Surgery  [facility] - 3rd Floor Medical Telemetry

## 2022-09-10 NOTE — PROGRESS NOTES
Very strong to amb 360ft with rollator.   09/10/22 1045   Pre Exercise Vitals   /72   Pulse 90   Supplemental O2? No   SpO2 99 %   During Exercise Vitals   Pulse 96   Supplemental O2? No   SpO2 98 %   Post Exercise Vitals   /77   Pulse 96   Supplemental O2? No   SpO2 99 %   Modality   Modality Walker

## 2022-09-12 ENCOUNTER — PATIENT OUTREACH (OUTPATIENT)
Dept: ADMINISTRATIVE | Facility: CLINIC | Age: 67
End: 2022-09-12
Payer: MEDICARE

## 2022-09-12 PROCEDURE — G0180 MD CERTIFICATION HHA PATIENT: HCPCS | Mod: ,,, | Performed by: THORACIC SURGERY (CARDIOTHORACIC VASCULAR SURGERY)

## 2022-09-12 PROCEDURE — G0180 PR HOME HEALTH MD CERTIFICATION: ICD-10-PCS | Mod: ,,, | Performed by: THORACIC SURGERY (CARDIOTHORACIC VASCULAR SURGERY)

## 2022-09-12 NOTE — PROGRESS NOTES
C3 nurse attempted to contact Gustavo Cheung for a TCC post hospital discharge follow up call. No answer, left VM, CB# provided.  The patient does not have a scheduled HOSFU appointment that I can locate.

## 2022-09-13 NOTE — PROGRESS NOTES
C3 nurse spoke with Gustavo Cheung   for a TCC post hospital discharge follow up call. The patient does not have a scheduled HOSFU appointment with No primary care provider on file.   within 5-7 days post hospital discharge date 09/19/2022. C3 nurse was unable to schedule HOSFU appointment in [facility] he will call make his apt. today

## 2022-09-16 LAB
GLUCOSE SERPL-MCNC: 147 MG/DL (ref 70–110)
HCO3 UR-SCNC: 21.1 MMOL/L (ref 24–28)
HCT VFR BLD CALC: 28 %PCV (ref 36–54)
HGB BLD-MCNC: 10 G/DL
PCO2 BLDA: 39.9 MMHG (ref 35–45)
PH SMN: 7.33 [PH] (ref 7.35–7.45)
PO2 BLDA: 44 MMHG (ref 40–60)
POC BE: -5 MMOL/L
POC IONIZED CALCIUM: 1.07 MMOL/L (ref 1.06–1.42)
POC SATURATED O2: 77 % (ref 95–100)
POC TCO2: 22 MMOL/L (ref 24–29)
POTASSIUM BLD-SCNC: 5.4 MMOL/L (ref 3.5–5.1)
SAMPLE: ABNORMAL
SODIUM BLD-SCNC: 139 MMOL/L (ref 136–145)

## 2022-09-19 LAB
GLUCOSE SERPL-MCNC: 163 MG/DL (ref 70–110)
HCO3 UR-SCNC: 18.8 MMOL/L (ref 24–28)
HCT VFR BLD CALC: 26 %PCV (ref 36–54)
HGB BLD-MCNC: 9 G/DL
PCO2 BLDA: 39.2 MMHG (ref 35–45)
PH SMN: 7.29 [PH] (ref 7.35–7.45)
PO2 BLDA: 302 MMHG (ref 80–100)
POC BE: -8 MMOL/L
POC IONIZED CALCIUM: 1.95 MMOL/L (ref 1.06–1.42)
POC SATURATED O2: 100 % (ref 95–100)
POC TCO2: 20 MMOL/L (ref 23–27)
POTASSIUM BLD-SCNC: 3.9 MMOL/L (ref 3.5–5.1)
SAMPLE: ABNORMAL
SODIUM BLD-SCNC: 140 MMOL/L (ref 136–145)

## 2022-09-21 ENCOUNTER — LAB VISIT (OUTPATIENT)
Dept: LAB | Facility: HOSPITAL | Age: 67
End: 2022-09-21
Attending: INTERNAL MEDICINE
Payer: MEDICARE

## 2022-09-21 DIAGNOSIS — I20.89 ANGINA DECUBITUS: Primary | ICD-10-CM

## 2022-09-21 LAB
ALBUMIN SERPL-MCNC: 3.7 GM/DL (ref 3.4–4.8)
ALBUMIN/GLOB SERPL: 1.1 RATIO (ref 1.1–2)
ALP SERPL-CCNC: 87 UNIT/L (ref 40–150)
ALT SERPL-CCNC: 19 UNIT/L (ref 0–55)
AST SERPL-CCNC: 13 UNIT/L (ref 5–34)
BILIRUBIN DIRECT+TOT PNL SERPL-MCNC: 0.3 MG/DL
BUN SERPL-MCNC: 12 MG/DL (ref 8.4–25.7)
CALCIUM SERPL-MCNC: 9.6 MG/DL (ref 8.8–10)
CHLORIDE SERPL-SCNC: 108 MMOL/L (ref 98–107)
CO2 SERPL-SCNC: 24 MMOL/L (ref 23–31)
CREAT SERPL-MCNC: 0.85 MG/DL (ref 0.73–1.18)
ERYTHROCYTE [DISTWIDTH] IN BLOOD BY AUTOMATED COUNT: 14.1 % (ref 11.5–17)
GFR SERPLBLD CREATININE-BSD FMLA CKD-EPI: >60 MLS/MIN/1.73/M2
GLOBULIN SER-MCNC: 3.4 GM/DL (ref 2.4–3.5)
GLUCOSE SERPL-MCNC: 127 MG/DL (ref 82–115)
HCT VFR BLD AUTO: 32.1 % (ref 42–52)
HGB BLD-MCNC: 10.2 GM/DL (ref 14–18)
MCH RBC QN AUTO: 29.1 PG (ref 27–31)
MCHC RBC AUTO-ENTMCNC: 31.8 MG/DL (ref 33–36)
MCV RBC AUTO: 91.5 FL (ref 80–94)
PLATELET # BLD AUTO: 673 X10(3)/MCL (ref 130–400)
PMV BLD AUTO: 9.1 FL (ref 7.4–10.4)
POTASSIUM SERPL-SCNC: 4.4 MMOL/L (ref 3.5–5.1)
PROT SERPL-MCNC: 7.1 GM/DL (ref 5.8–7.6)
RBC # BLD AUTO: 3.51 X10(6)/MCL (ref 4.7–6.1)
SODIUM SERPL-SCNC: 139 MMOL/L (ref 136–145)
WBC # SPEC AUTO: 9.3 X10(3)/MCL (ref 4.5–11.5)

## 2022-09-21 PROCEDURE — 80053 COMPREHEN METABOLIC PANEL: CPT

## 2022-09-21 PROCEDURE — 36415 COLL VENOUS BLD VENIPUNCTURE: CPT

## 2022-09-21 PROCEDURE — 85027 COMPLETE CBC AUTOMATED: CPT

## 2022-10-03 ENCOUNTER — OFFICE VISIT (OUTPATIENT)
Dept: CARDIAC SURGERY | Facility: CLINIC | Age: 67
End: 2022-10-03
Payer: MEDICARE

## 2022-10-03 VITALS — DIASTOLIC BLOOD PRESSURE: 76 MMHG | SYSTOLIC BLOOD PRESSURE: 133 MMHG

## 2022-10-03 DIAGNOSIS — Z95.1 HX OF CABG: ICD-10-CM

## 2022-10-03 PROCEDURE — 99024 PR POST-OP FOLLOW-UP VISIT: ICD-10-PCS | Mod: ,,, | Performed by: THORACIC SURGERY (CARDIOTHORACIC VASCULAR SURGERY)

## 2022-10-03 PROCEDURE — 99024 POSTOP FOLLOW-UP VISIT: CPT | Mod: ,,, | Performed by: THORACIC SURGERY (CARDIOTHORACIC VASCULAR SURGERY)

## 2022-10-03 NOTE — PROGRESS NOTES
Patient is status post coronary artery bypass grafting doing well without complaints   Vital signs stable/afebrile   Regular rate and rhythm without murmurs rubs or gallops  Coarse breath sounds bilaterally   Wounds CDI   Echocardiogram reviewed   A/P:  Doing well without complaints.  Plan per Cardiology

## 2022-10-04 PROBLEM — Z95.1 HX OF CABG: Status: ACTIVE | Noted: 2022-10-04

## 2022-10-10 ENCOUNTER — DOCUMENT SCAN (OUTPATIENT)
Dept: HOME HEALTH SERVICES | Facility: HOSPITAL | Age: 67
End: 2022-10-10
Payer: MEDICARE

## 2022-10-11 ENCOUNTER — EXTERNAL HOME HEALTH (OUTPATIENT)
Dept: HOME HEALTH SERVICES | Facility: HOSPITAL | Age: 67
End: 2022-10-11
Payer: MEDICARE

## 2022-12-05 PROBLEM — I21.4 TYPE 1 NON-ST ELEVATION MYOCARDIAL INFARCTION (NSTEMI): Status: RESOLVED | Noted: 2022-09-02 | Resolved: 2022-12-05

## 2023-06-29 RX ORDER — LISINOPRIL 10 MG/1
TABLET ORAL
Qty: 90 TABLET | Refills: 3 | Status: SHIPPED | OUTPATIENT
Start: 2023-06-29 | End: 2023-07-13

## 2023-07-13 RX ORDER — LISINOPRIL 10 MG/1
TABLET ORAL
Qty: 90 TABLET | Refills: 3 | Status: SHIPPED | OUTPATIENT
Start: 2023-07-13

## 2023-09-13 RX ORDER — METOPROLOL SUCCINATE 50 MG/1
50 TABLET, EXTENDED RELEASE ORAL
Qty: 90 TABLET | Refills: 1 | Status: SHIPPED | OUTPATIENT
Start: 2023-09-13 | End: 2024-04-02

## 2023-10-09 RX ORDER — FAMOTIDINE 20 MG/1
20 TABLET, FILM COATED ORAL 2 TIMES DAILY
Qty: 60 TABLET | Refills: 11 | Status: SHIPPED | OUTPATIENT
Start: 2023-10-09

## 2023-10-31 RX ORDER — ASPIRIN 81 MG/1
81 TABLET ORAL
Qty: 30 TABLET | Refills: 11 | Status: SHIPPED | OUTPATIENT
Start: 2023-10-31

## 2023-11-10 RX ORDER — ATORVASTATIN CALCIUM 40 MG/1
40 TABLET, FILM COATED ORAL NIGHTLY
Qty: 90 TABLET | Refills: 3 | Status: SHIPPED | OUTPATIENT
Start: 2023-11-10

## 2024-02-16 ENCOUNTER — LAB VISIT (OUTPATIENT)
Dept: LAB | Facility: HOSPITAL | Age: 69
End: 2024-02-16
Attending: NURSE PRACTITIONER
Payer: MEDICARE

## 2024-02-16 DIAGNOSIS — E78.2 MIXED HYPERLIPIDEMIA: ICD-10-CM

## 2024-02-16 DIAGNOSIS — I10 HYPERTENSION, UNSPECIFIED TYPE: Primary | ICD-10-CM

## 2024-02-16 LAB
ALBUMIN SERPL-MCNC: 3.9 G/DL (ref 3.4–4.8)
ALBUMIN/GLOB SERPL: 1.3 RATIO (ref 1.1–2)
ALP SERPL-CCNC: 55 UNIT/L (ref 40–150)
ALT SERPL-CCNC: 14 UNIT/L (ref 0–55)
AST SERPL-CCNC: 16 UNIT/L (ref 5–34)
BILIRUB SERPL-MCNC: 0.4 MG/DL
BUN SERPL-MCNC: 13 MG/DL (ref 8.4–25.7)
CALCIUM SERPL-MCNC: 9.4 MG/DL (ref 8.8–10)
CHLORIDE SERPL-SCNC: 108 MMOL/L (ref 98–107)
CHOLEST SERPL-MCNC: 198 MG/DL
CHOLEST/HDLC SERPL: 4 {RATIO} (ref 0–5)
CO2 SERPL-SCNC: 25 MMOL/L (ref 23–31)
CREAT SERPL-MCNC: 0.94 MG/DL (ref 0.73–1.18)
GFR SERPLBLD CREATININE-BSD FMLA CKD-EPI: >60 MLS/MIN/1.73/M2
GLOBULIN SER-MCNC: 3 GM/DL (ref 2.4–3.5)
GLUCOSE SERPL-MCNC: 101 MG/DL (ref 82–115)
HDLC SERPL-MCNC: 51 MG/DL (ref 35–60)
LDLC SERPL CALC-MCNC: 128 MG/DL (ref 50–140)
POTASSIUM SERPL-SCNC: 4.5 MMOL/L (ref 3.5–5.1)
PROT SERPL-MCNC: 6.9 GM/DL (ref 5.8–7.6)
SODIUM SERPL-SCNC: 143 MMOL/L (ref 136–145)
TRIGL SERPL-MCNC: 94 MG/DL (ref 34–140)
VLDLC SERPL CALC-MCNC: 19 MG/DL

## 2024-02-16 PROCEDURE — 80053 COMPREHEN METABOLIC PANEL: CPT

## 2024-02-16 PROCEDURE — 80061 LIPID PANEL: CPT

## 2024-02-16 PROCEDURE — 36415 COLL VENOUS BLD VENIPUNCTURE: CPT

## 2024-03-05 RX ORDER — CLOPIDOGREL BISULFATE 75 MG/1
75 TABLET ORAL
Qty: 30 TABLET | Refills: 11 | Status: SHIPPED | OUTPATIENT
Start: 2024-03-05

## 2024-04-02 RX ORDER — METOPROLOL SUCCINATE 50 MG/1
50 TABLET, EXTENDED RELEASE ORAL
Qty: 90 TABLET | Refills: 1 | Status: SHIPPED | OUTPATIENT
Start: 2024-04-02

## 2024-04-03 PROBLEM — Z76.89 ENCOUNTER TO ESTABLISH CARE: Status: ACTIVE | Noted: 2024-04-03

## 2024-04-03 NOTE — PROGRESS NOTES
Date: 04/09/2024  Patient ID: [id number]   Chief Complaint: Establish Care (New patient to establish care)    HPI:   Gustavo Cheung is a 68 y.o. male who is here today to establish care. Had MI 1.5yrs ago and had CABG and was placed on blood thinner-had to stop 2/2 needing dental work. Plans to see cardiology soon. 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 Patient is independent and works in the yard regularly. Compliant with meds below for chronic conditions. Atorvastatin caused hallucinations.    Past Medical History:   Diagnosis Date    Calcaneus fracture, right 2010    without immediate repair    COVID 08/2022    GERD (gastroesophageal reflux disease)     Hyperlipidemia     Hypertension     Myocardial infarction 08/2022    Stable angina pectoris 09/02/2022    Valvular heart disease      Past Surgical History:   Procedure Laterality Date    CORONARY ARTERY BYPASS GRAFT (CABG) N/A 09/05/2022    Procedure: CORONARY ARTERY BYPASS GRAFT (CABG);  Surgeon: Adolfo Coleman MD;  Location: [facility];  Service: Cardiothoracic;  Laterality: N/A;    HERNIA REPAIR Right 2016    LEFT HEART CATHETERIZATION Left 08/31/2022    Procedure: Left heart cath;  Surgeon: Luis Daniel Maza MD;  Location: [facility] CATH LAB;  Service: Cardiology;  Laterality: Left;    PERCUTANEOUS TRANSLUMINAL BALLOON ANGIOPLASTY OF CORONARY ARTERY  08/31/2022    Procedure: Angioplasty-coronary;  Surgeon: Luis Daniel Maza MD;  Location: [facility] CATH LAB;  Service: Cardiology;;     Review of patient's allergies indicates:  No Known Allergies  Outpatient Medications Marked as Taking for the 4/9/24 encounter (Office Visit) with Daja Baron MD   Medication Sig Dispense Refill    aspirin (ECOTRIN) 81 MG EC tablet TAKE 1 TABLET BY MOUTH EVERY DAY 30 tablet 11    clopidogreL (PLAVIX) 75 mg tablet TAKE 1 TABLET(75 MG) BY MOUTH EVERY DAY 30 tablet 11     "ezetimibe (ZETIA) 10 mg tablet Take 10 mg by mouth.      famotidine (PEPCID) 20 MG tablet TAKE 1 TABLET(20 MG) BY MOUTH TWICE DAILY 60 tablet 11    lisinopriL 10 MG tablet TAKE 1 TABLET(10 MG) BY MOUTH EVERY DAY 90 tablet 3    metoprolol succinate (TOPROL-XL) 50 MG 24 hr tablet TAKE 1 TABLET(50 MG) BY MOUTH EVERY DAY 90 tablet 1    rosuvastatin (CRESTOR) 20 MG tablet Take 20 mg by mouth.       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   Tobacco Use    Smoking status: Former     Current packs/day: 0.00     Types: Cigarettes     Quit date: 2016     Years since quittin.6    Smokeless tobacco: Never    Tobacco comments:     Started at 34yrs, 1ppd, Quit    Substance and Sexual Activity    Alcohol use: Not Currently    Drug use: Never    Sexual activity: Not Currently     Birth control/protection: Abstinence     Patient Care Team:  Daja Baron MD as PCP - General (Family Medicine)  Ross Gutierrez PA-C as Physician Assistant (Physician Assistant)  Dennys Merida FNP as Nurse Practitioner (Cardiology)   Subjective:   ROS  See HPI for details  All Other ROS: Negative except as stated in HPI  Objective:   /80   Pulse 71   Temp 98.5 °F (36.9 °C)   Ht 5' 4" (1.626 m)   Wt 81.5 kg (179 lb 9.6 oz)   SpO2 (!) 94%   BMI 30.83 kg/m²   Physical Exam  Constitutional:       Appearance: He is obese.   Cardiovascular:      Rate and Rhythm: Normal rate and regular rhythm.      Heart sounds: Normal heart sounds.      Comments: Decreased amplitude  Pulmonary:      Effort: Pulmonary effort is normal.      Breath sounds: Normal breath sounds.   Neurological:      Mental Status: He is alert and " oriented to person, place, and time.   Psychiatric:         Mood and Affect: Mood normal.         Behavior: Behavior normal.         Thought Content: Thought content normal.         Judgment: Judgment normal.       General: NAD  Eye: EOMI  HENT: no nasal discharge  Respiratory: non-labored breathing  Musculoskeletal: ambulates independently. No obvious deformity  Integumentary: no apparent discoloration  Neurologic: Alert, oriented to person and situation  Cognition and Speech: Speech clear and coherent.   Psychiatric: Cooperative    Assessment:       ICD-10-CM ICD-9-CM   1. Encounter to establish care  Z76.89 V65.8   2. Gastroesophageal reflux disease, unspecified whether esophagitis present  K21.9 530.81   3. Hyperlipidemia, unspecified hyperlipidemia type  E78.5 272.4   4. Primary hypertension  I10 401.9   5. Class 1 obesity with body mass index (BMI) of 30.0 to 30.9 in adult, unspecified obesity type, unspecified whether serious comorbidity present  E66.9 278.00    Z68.30 V85.30   6. Screening for malignant neoplasm of prostate  Z12.5 V76.44   7. H/O: obesity  Z86.39 V12.29   8. Hx of CABG  Z95.1 V45.81      Plan:   1. Encounter to establish care  Assessment & Plan:  Obtain routine labs  F/u for wellness      Orders:  -     CBC Auto Differential; Future; Expected date: 04/09/2024  -     Comprehensive Metabolic Panel; Future; Expected date: 04/09/2024  -     Lipid Panel; Future; Expected date: 04/09/2024  -     TSH; Future; Expected date: 04/09/2024  -     Hemoglobin A1C; Future; Expected date: 04/09/2024  -     Urinalysis; Future; Expected date: 04/09/2024  -     T4, Free; Future; Expected date: 04/09/2024  -     PSA, Screening; Future; Expected date: 04/09/2024    2. Gastroesophageal reflux disease, unspecified whether esophagitis present  Assessment & Plan:  Stable.  Continue Pepcid 20 mg b.i.d.      3. Hyperlipidemia, unspecified hyperlipidemia type  Assessment & Plan:  Continue rosuvastatin 20 mg daily,  Zetia 10 mg daily    Orders:  -     CBC Auto Differential; Future; Expected date: 04/09/2024  -     Lipid Panel; Future; Expected date: 04/09/2024  -     Hemoglobin A1C; Future; Expected date: 04/09/2024    4. Primary hypertension  Assessment & Plan:  Continue current medication regimen:  Lisinopril 10 mg daily, metoprolol 50 mg daily  Blood pressure at goal <140/90 (<130/80 if otherwise noted)  Recommend DASH diet  Avoid tobacco use  Record BP at home daily and bring log to next office visit, assure that home cuff is calibrated at minimum every 12 months      Orders:  -     CBC Auto Differential; Future; Expected date: 04/09/2024  -     TSH; Future; Expected date: 04/09/2024  -     T4, Free; Future; Expected date: 04/09/2024    5. Class 1 obesity with body mass index (BMI) of 30.0 to 30.9 in adult, unspecified obesity type, unspecified whether serious comorbidity present  Assessment & Plan:  Body mass index is 30.83 kg/m².  For BMI 25-30: recommend behavior modifications  Recommend intensive, multicomponent, behavioral interventions: Goal BMI <30.  -Goal is to exercise at least 5 times a week for 30 minutes per day.   -Stand more each day.   -Increase exercise: Start with 10 minutes daily and build up to 60-90 minutes/day with moderate activity  -Reduce caloric intake to about 1500 kcal/day  -Avoid soda, simple sugars, excessive rice, potatoes or bread. Limit fast foods and fried foods.  -Choose complex carbs in moderation (example: green vegetables, beans, oatmeal). Eat plenty of fresh fruits and vegetables with lean meats daily.  -Do not skip meals. Eat a balanced portion size.  -Avoid fad diets. Consider long-term healthy life style changes.       Orders:  -     CBC Auto Differential; Future; Expected date: 04/09/2024  -     Comprehensive Metabolic Panel; Future; Expected date: 04/09/2024  -     Lipid Panel; Future; Expected date: 04/09/2024  -     TSH; Future; Expected date: 04/09/2024  -     Hemoglobin A1C;  Future; Expected date: 04/09/2024  -     Urinalysis; Future; Expected date: 04/09/2024  -     T4, Free; Future; Expected date: 04/09/2024    6. Screening for malignant neoplasm of prostate  -     PSA, Screening; Future; Expected date: 04/09/2024    7. H/O: obesity  -     Hemoglobin A1C; Future; Expected date: 04/09/2024    8. Hx of CABG  Overview:  Status post CABG.    Assessment & Plan:  Stable.  Continue management per Cardiology with aspirin, Plavix, Zetia, Crestor           Medication List with Changes/Refills   Current Medications    ASPIRIN (ECOTRIN) 81 MG EC TABLET    TAKE 1 TABLET BY MOUTH EVERY DAY       Start Date: 10/31/2023End Date: --    ATORVASTATIN (LIPITOR) 40 MG TABLET    TAKE 1 TABLET(40 MG) BY MOUTH EVERY EVENING       Start Date: 11/10/2023End Date: --    CLOPIDOGREL (PLAVIX) 75 MG TABLET    TAKE 1 TABLET(75 MG) BY MOUTH EVERY DAY       Start Date: 3/5/2024  End Date: --    EZETIMIBE (ZETIA) 10 MG TABLET    Take 10 mg by mouth.       Start Date: 2/12/2024 End Date: --    FAMOTIDINE (PEPCID) 20 MG TABLET    TAKE 1 TABLET(20 MG) BY MOUTH TWICE DAILY       Start Date: 10/9/2023 End Date: --    FOLIC ACID (FOLVITE) 1 MG TABLET    Take 1 tablet (1 mg total) by mouth once daily.       Start Date: 9/10/2022 End Date: 9/10/2023    HYDROCODONE-ACETAMINOPHEN (NORCO) 5-325 MG PER TABLET    Take 1 tablet by mouth every 6 (six) hours as needed for Pain.       Start Date: 9/10/2022 End Date: --    LISINOPRIL 10 MG TABLET    TAKE 1 TABLET(10 MG) BY MOUTH EVERY DAY       Start Date: 7/13/2023 End Date: --    METOPROLOL SUCCINATE (TOPROL-XL) 50 MG 24 HR TABLET    TAKE 1 TABLET(50 MG) BY MOUTH EVERY DAY       Start Date: 4/2/2024  End Date: --    ROSUVASTATIN (CRESTOR) 20 MG TABLET    Take 20 mg by mouth.       Start Date: 2/20/2024 End Date: --          Follow up in about 4 weeks (around 5/7/2024) for Medicare Wellness. In addition to their scheduled follow up, the patient has also been instructed to follow up on  as needed basis.

## 2024-04-09 ENCOUNTER — OFFICE VISIT (OUTPATIENT)
Dept: PRIMARY CARE CLINIC | Facility: CLINIC | Age: 69
End: 2024-04-09
Payer: MEDICARE

## 2024-04-09 VITALS
SYSTOLIC BLOOD PRESSURE: 132 MMHG | DIASTOLIC BLOOD PRESSURE: 80 MMHG | OXYGEN SATURATION: 94 % | HEART RATE: 71 BPM | WEIGHT: 179.63 LBS | TEMPERATURE: 99 F | HEIGHT: 64 IN | BODY MASS INDEX: 30.67 KG/M2

## 2024-04-09 DIAGNOSIS — Z86.39 H/O: OBESITY: ICD-10-CM

## 2024-04-09 DIAGNOSIS — K21.9 GASTROESOPHAGEAL REFLUX DISEASE, UNSPECIFIED WHETHER ESOPHAGITIS PRESENT: ICD-10-CM

## 2024-04-09 DIAGNOSIS — I10 PRIMARY HYPERTENSION: ICD-10-CM

## 2024-04-09 DIAGNOSIS — Z76.89 ENCOUNTER TO ESTABLISH CARE: Primary | ICD-10-CM

## 2024-04-09 DIAGNOSIS — E78.5 HYPERLIPIDEMIA, UNSPECIFIED HYPERLIPIDEMIA TYPE: ICD-10-CM

## 2024-04-09 DIAGNOSIS — Z12.5 SCREENING FOR MALIGNANT NEOPLASM OF PROSTATE: ICD-10-CM

## 2024-04-09 DIAGNOSIS — E66.9 CLASS 1 OBESITY WITH BODY MASS INDEX (BMI) OF 30.0 TO 30.9 IN ADULT, UNSPECIFIED OBESITY TYPE, UNSPECIFIED WHETHER SERIOUS COMORBIDITY PRESENT: ICD-10-CM

## 2024-04-09 DIAGNOSIS — Z95.1 HX OF CABG: ICD-10-CM

## 2024-04-09 PROBLEM — I20.89 STABLE ANGINA PECTORIS: Status: RESOLVED | Noted: 2022-09-02 | Resolved: 2024-04-09

## 2024-04-09 PROBLEM — E66.811 CLASS 1 OBESITY WITH BODY MASS INDEX (BMI) OF 30.0 TO 30.9 IN ADULT: Status: ACTIVE | Noted: 2024-04-09

## 2024-04-09 PROCEDURE — 99204 OFFICE O/P NEW MOD 45 MIN: CPT | Mod: ,,, | Performed by: STUDENT IN AN ORGANIZED HEALTH CARE EDUCATION/TRAINING PROGRAM

## 2024-04-09 RX ORDER — ROSUVASTATIN CALCIUM 20 MG/1
20 TABLET, COATED ORAL
COMMUNITY
Start: 2024-02-20

## 2024-04-09 RX ORDER — EZETIMIBE 10 MG/1
10 TABLET ORAL
COMMUNITY
Start: 2024-02-12

## 2024-04-09 NOTE — ASSESSMENT & PLAN NOTE
Continue current medication regimen:  Lisinopril 10 mg daily, metoprolol 50 mg daily  Blood pressure at goal <140/90 (<130/80 if otherwise noted)  Recommend DASH diet  Avoid tobacco use  Record BP at home daily and bring log to next office visit, assure that home cuff is calibrated at minimum every 12 months

## 2024-04-09 NOTE — ASSESSMENT & PLAN NOTE
Body mass index is 30.83 kg/m².  For BMI 25-30: recommend behavior modifications  Recommend intensive, multicomponent, behavioral interventions: Goal BMI <30.  -Goal is to exercise at least 5 times a week for 30 minutes per day.   -Stand more each day.   -Increase exercise: Start with 10 minutes daily and build up to 60-90 minutes/day with moderate activity  -Reduce caloric intake to about 1500 kcal/day  -Avoid soda, simple sugars, excessive rice, potatoes or bread. Limit fast foods and fried foods.  -Choose complex carbs in moderation (example: green vegetables, beans, oatmeal). Eat plenty of fresh fruits and vegetables with lean meats daily.  -Do not skip meals. Eat a balanced portion size.  -Avoid fad diets. Consider long-term healthy life style changes.

## 2024-04-11 RX ORDER — METOPROLOL SUCCINATE 50 MG/1
50 TABLET, EXTENDED RELEASE ORAL
Qty: 90 TABLET | Refills: 1 | Status: SHIPPED | OUTPATIENT
Start: 2024-04-11

## 2024-05-07 ENCOUNTER — LAB VISIT (OUTPATIENT)
Dept: LAB | Facility: HOSPITAL | Age: 69
End: 2024-05-07
Attending: STUDENT IN AN ORGANIZED HEALTH CARE EDUCATION/TRAINING PROGRAM
Payer: MEDICARE

## 2024-05-07 DIAGNOSIS — Z86.39 H/O: OBESITY: ICD-10-CM

## 2024-05-07 DIAGNOSIS — E78.5 HYPERLIPIDEMIA, UNSPECIFIED HYPERLIPIDEMIA TYPE: ICD-10-CM

## 2024-05-07 DIAGNOSIS — Z12.5 SCREENING FOR MALIGNANT NEOPLASM OF PROSTATE: ICD-10-CM

## 2024-05-07 DIAGNOSIS — E66.9 CLASS 1 OBESITY WITH BODY MASS INDEX (BMI) OF 30.0 TO 30.9 IN ADULT, UNSPECIFIED OBESITY TYPE, UNSPECIFIED WHETHER SERIOUS COMORBIDITY PRESENT: ICD-10-CM

## 2024-05-07 DIAGNOSIS — Z76.89 ENCOUNTER TO ESTABLISH CARE: ICD-10-CM

## 2024-05-07 DIAGNOSIS — I10 PRIMARY HYPERTENSION: ICD-10-CM

## 2024-05-07 LAB
ALBUMIN SERPL-MCNC: 4.1 G/DL (ref 3.4–4.8)
ALBUMIN/GLOB SERPL: 1.5 RATIO (ref 1.1–2)
ALP SERPL-CCNC: 51 UNIT/L (ref 40–150)
ALT SERPL-CCNC: 25 UNIT/L (ref 0–55)
AST SERPL-CCNC: 23 UNIT/L (ref 5–34)
BASOPHILS # BLD AUTO: 0.05 X10(3)/MCL
BASOPHILS NFR BLD AUTO: 0.8 %
BILIRUB SERPL-MCNC: 0.5 MG/DL
BUN SERPL-MCNC: 15 MG/DL (ref 8.4–25.7)
CALCIUM SERPL-MCNC: 8.9 MG/DL (ref 8.8–10)
CHLORIDE SERPL-SCNC: 111 MMOL/L (ref 98–107)
CHOLEST SERPL-MCNC: 138 MG/DL
CHOLEST/HDLC SERPL: 2 {RATIO} (ref 0–5)
CO2 SERPL-SCNC: 24 MMOL/L (ref 23–31)
CREAT SERPL-MCNC: 0.85 MG/DL (ref 0.73–1.18)
EOSINOPHIL # BLD AUTO: 0.16 X10(3)/MCL (ref 0–0.9)
EOSINOPHIL NFR BLD AUTO: 2.5 %
ERYTHROCYTE [DISTWIDTH] IN BLOOD BY AUTOMATED COUNT: 13.6 % (ref 11.5–17)
EST. AVERAGE GLUCOSE BLD GHB EST-MCNC: 125.5 MG/DL
GFR SERPLBLD CREATININE-BSD FMLA CKD-EPI: >60 MLS/MIN/1.73/M2
GLOBULIN SER-MCNC: 2.8 GM/DL (ref 2.4–3.5)
GLUCOSE SERPL-MCNC: 95 MG/DL (ref 82–115)
HBA1C MFR BLD: 6 %
HCT VFR BLD AUTO: 37.9 % (ref 42–52)
HDLC SERPL-MCNC: 56 MG/DL (ref 35–60)
HGB BLD-MCNC: 12.3 G/DL (ref 14–18)
IMM GRANULOCYTES # BLD AUTO: 0.02 X10(3)/MCL (ref 0–0.04)
IMM GRANULOCYTES NFR BLD AUTO: 0.3 %
LDLC SERPL CALC-MCNC: 68 MG/DL (ref 50–140)
LYMPHOCYTES # BLD AUTO: 1.85 X10(3)/MCL (ref 0.6–4.6)
LYMPHOCYTES NFR BLD AUTO: 28.7 %
MCH RBC QN AUTO: 28.7 PG (ref 27–31)
MCHC RBC AUTO-ENTMCNC: 32.5 G/DL (ref 33–36)
MCV RBC AUTO: 88.6 FL (ref 80–94)
MONOCYTES # BLD AUTO: 0.54 X10(3)/MCL (ref 0.1–1.3)
MONOCYTES NFR BLD AUTO: 8.4 %
NEUTROPHILS # BLD AUTO: 3.83 X10(3)/MCL (ref 2.1–9.2)
NEUTROPHILS NFR BLD AUTO: 59.3 %
PLATELET # BLD AUTO: 261 X10(3)/MCL (ref 130–400)
PMV BLD AUTO: 10.1 FL (ref 7.4–10.4)
POTASSIUM SERPL-SCNC: 4.1 MMOL/L (ref 3.5–5.1)
PROT SERPL-MCNC: 6.9 GM/DL (ref 5.8–7.6)
PSA SERPL-MCNC: 4.09 NG/ML
RBC # BLD AUTO: 4.28 X10(6)/MCL (ref 4.7–6.1)
SODIUM SERPL-SCNC: 141 MMOL/L (ref 136–145)
T4 FREE SERPL-MCNC: 0.91 NG/DL (ref 0.7–1.48)
TRIGL SERPL-MCNC: 72 MG/DL (ref 34–140)
TSH SERPL-ACNC: 1.52 UIU/ML (ref 0.35–4.94)
VLDLC SERPL CALC-MCNC: 14 MG/DL
WBC # SPEC AUTO: 6.45 X10(3)/MCL (ref 4.5–11.5)

## 2024-05-07 PROCEDURE — 36415 COLL VENOUS BLD VENIPUNCTURE: CPT

## 2024-05-07 PROCEDURE — 83036 HEMOGLOBIN GLYCOSYLATED A1C: CPT

## 2024-05-07 PROCEDURE — 80053 COMPREHEN METABOLIC PANEL: CPT

## 2024-05-07 PROCEDURE — 85025 COMPLETE CBC W/AUTO DIFF WBC: CPT

## 2024-05-07 PROCEDURE — 84439 ASSAY OF FREE THYROXINE: CPT

## 2024-05-07 PROCEDURE — 84153 ASSAY OF PSA TOTAL: CPT

## 2024-05-07 PROCEDURE — 80061 LIPID PANEL: CPT

## 2024-05-07 PROCEDURE — 84443 ASSAY THYROID STIM HORMONE: CPT

## 2024-05-09 PROBLEM — D64.9 NORMOCYTIC ANEMIA: Status: ACTIVE | Noted: 2024-05-09

## 2024-05-09 PROBLEM — R97.20 ELEVATED PSA: Status: ACTIVE | Noted: 2024-05-09

## 2024-05-09 PROBLEM — Z00.00 WELLNESS EXAMINATION: Status: ACTIVE | Noted: 2024-04-03

## 2024-05-09 PROBLEM — R73.03 PREDIABETES: Status: ACTIVE | Noted: 2024-05-09

## 2024-05-09 NOTE — PROGRESS NOTES
Date: 05/14/2024  Patient ID: [id number]   Chief Complaint: Medicare AWV    HPI:   Gustavo Cheung is a 68 y.o. male here today for a Medicare Wellness.     Opioid Screening: Patient medication list reviewed, patient is not taking prescription opioids. Patient is not using additional opioids than prescribed. Patient is at low risk of substance abuse based on this opioid use history.     Otherwise, patient reports to be doing well.  Labs WNL except PSA 4.09, improved but still decreased hemoglobin at 12.3, A1c slightly increased from 5.9-6    Diet: plans to do IF for weight loss  Activity level: yard work, active at home. Also cares for sister's property  Cognition: answering questions appropriately and following conversation without issues. No sign of cognitive decline during this exam  PSA: 4.09 - refer to cardiology  CRC: refer to GI  CT chest: pending  AAA US(65-74yo): pending      Patient Active Problem List   Diagnosis    Hx of CABG    Wellness examination    GERD (gastroesophageal reflux disease)    Hyperlipidemia    Hypertension    Class 1 obesity with body mass index (BMI) of 30.0 to 30.9 in adult    Elevated PSA    Normocytic anemia    Prediabetes     Outpatient Medications Marked as Taking for the 5/14/24 encounter (Office Visit) with Daja Baron MD   Medication Sig Dispense Refill    clopidogreL (PLAVIX) 75 mg tablet TAKE 1 TABLET(75 MG) BY MOUTH EVERY DAY 30 tablet 11    ezetimibe (ZETIA) 10 mg tablet Take 10 mg by mouth.      famotidine (PEPCID) 20 MG tablet TAKE 1 TABLET(20 MG) BY MOUTH TWICE DAILY 60 tablet 11    lisinopriL 10 MG tablet TAKE 1 TABLET(10 MG) BY MOUTH EVERY DAY 90 tablet 3    metoprolol succinate (TOPROL-XL) 50 MG 24 hr tablet TAKE 1 TABLET(50 MG) BY MOUTH EVERY DAY 90 tablet 1    rosuvastatin (CRESTOR) 20 MG tablet Take 20 mg by mouth.      [DISCONTINUED] aspirin (ECOTRIN) 81 MG EC tablet TAKE 1 TABLET BY MOUTH EVERY DAY 30 tablet 11     Past Medical History:   Diagnosis Date     Calcaneus fracture, right     without immediate repair    COVID 2022    GERD (gastroesophageal reflux disease)     Hyperlipidemia     Hypertension     Myocardial infarction 2022    Stable angina pectoris 2022    Valvular heart disease      Past Surgical History:   Procedure Laterality Date    CORONARY ARTERY BYPASS GRAFT (CABG) N/A 2022    Procedure: CORONARY ARTERY BYPASS GRAFT (CABG);  Surgeon: Adolfo Coleman MD;  Location: [facility] OR;  Service: Cardiothoracic;  Laterality: N/A;    HERNIA REPAIR Right     LEFT HEART CATHETERIZATION Left 2022    Procedure: Left heart cath;  Surgeon: Luis Daniel Maza MD;  Location: [facility] CATH LAB;  Service: Cardiology;  Laterality: Left;    PERCUTANEOUS TRANSLUMINAL BALLOON ANGIOPLASTY OF CORONARY ARTERY  2022    Procedure: Angioplasty-coronary;  Surgeon: Luis Daniel Maza MD;  Location: [facility] CATH LAB;  Service: Cardiology;;     Review of patient's allergies indicates:  No Known Allergies  Family History   Problem Relation Name Age of Onset    Emphysema Father      COPD Father      Cancer Other          mother's side    Diabetes Other          father's side      Social History     Socioeconomic History    Marital status:     Number of children: 0   Occupational History    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   Tobacco Use    Smoking status: Former     Current packs/day: 0.00     Types: Cigarettes     Quit date: 2016     Years since quittin.7    Smokeless tobacco: Never    Tobacco comments:     Started at 34yrs, 1ppd, Quit    Substance and Sexual Activity    Alcohol use: Not Currently    Drug use: Never    Sexual activity: Not Currently     Birth control/protection: Abstinence     Social Determinants of Health     Financial Resource Strain: Medium Risk (2024)    Overall Financial Resource Strain (CARDIA)     Difficulty of Paying Living Expenses: Somewhat hard   Food Insecurity: No Food  Insecurity (5/14/2024)    Hunger Vital Sign     Worried About Running Out of Food in the Last Year: Never true     Ran Out of Food in the Last Year: Never true   Transportation Needs: No Transportation Needs (5/14/2024)    TRANSPORTATION NEEDS     Transportation : No   Physical Activity: Insufficiently Active (5/14/2024)    Exercise Vital Sign     Days of Exercise per Week: 2 days     Minutes of Exercise per Session: 50 min   Stress: No Stress Concern Present (5/14/2024)    [employer] [place] [employer] - Occupational Stress Questionnaire     Feeling of Stress : Not at all   Housing Stability: Low Risk  (5/14/2024)    Housing Stability Vital Sign     Unable to Pay for Housing in the Last Year: No     Homeless in the Last Year: No     Patient Care Team:  Daja Baron MD as PCP - General (Family Medicine)  Ross Gutierrez PA-C as Physician Assistant (Physician Assistant)  Dennys Merida FNP as Nurse Practitioner (Cardiology)   Subjective:   Review of Systems   Constitutional:  Negative for fever and weight loss.   HENT:  Negative for congestion, hearing loss and sore throat.    Eyes:  Negative for blurred vision and double vision.   Respiratory:  Negative for cough and shortness of breath.    Cardiovascular:  Negative for chest pain and palpitations.   Gastrointestinal:  Negative for abdominal pain and diarrhea.   Genitourinary:  Negative for dysuria, frequency, hematuria and urgency.   Musculoskeletal:  Negative for falls.   Skin:  Negative for rash.   Psychiatric/Behavioral:  Negative for depression and memory loss. The patient is not nervous/anxious and does not have insomnia.      See HPI for details  All Other ROS: Negative except as stated in HPI  Patient Reported Health Risk Assessment  What is your age?: 65-69  Are you male or female?: Male  During the past four weeks, how much have you been bothered by emotional problems such as feeling anxious, depressed, irritable, sad, or downhearted  and blue?: Not at all  During the past five weeks, has your physical and/or emotional health limited your social activities with family, friends, neighbors, or groups?: Not at all  During the past four weeks, how much bodily pain have you generally had?: Mild pain  During the past four weeks, was someone available to help if you needed and wanted help?: Yes, as much as I wanted  During the past four weeks, what was the hardest physical activity you could do for at least two minutes?: Very heavy  Can you get to places out of walking distance without help?  (For example, can you travel alone on buses or taxis, or drive your own car?): Yes  Can you go shopping for groceries or clothes without someone's help?: Yes  Can you prepare your own meals?: Yes  Can you do your own housework without help?: Yes  Because of any health problems, do you need the help of another person with your personal care needs such as eating, bathing, dressing, or getting around the house?: No  Can you handle your own money without help?: Yes  During the past four weeks, how would you rate your health in general?: Very good  How have things been going for you during the past four weeks?: Pretty well  Are you having difficulties driving your car?: No  Do you always fasten your seat belt when you are in a car?: Yes, usually  How often in the past four weeks have you been bothered by falling or dizzy when standing up?: Never  How often in the past four weeks have you been bothered by sexual problems?: Never  How often in the past four weeks have you been bothered by trouble eating well?: Never  How often in the past four weeks have you been bothered by teeth or denture problems?: Never  How often in the past four weeks have you been bothered with problems using the telephone?: Never  How often in the past four weeks have you been bothered by tiredness or fatigue?: Never  Have you fallen two or more times in the past year?: No  Are you afraid of  "falling?: No  Are you a smoker?: No  During the past four weeks, how many drinks of wine, beer, or other alcoholic beverages did you have?: No alcohol at all  Do you exercise for about 20 minutes three or more days a week?: Yes, most of the time  Have you been given any information to help you with hazards in your house that might hurt you?: Yes  Have you been given any information to help you with keeping track of your medications?: Yes  How often do you have trouble taking medicines the way you've been told to take them?: I always take them as prescribed  How confident are you that you can control and manage most of your health problems?: Very confident  What is your race? (Check all that apply.):   Objective:   /75   Pulse 70   Temp 98.1 °F (36.7 °C)   Ht 5' 4" (1.626 m)   Wt 81.9 kg (180 lb 8 oz)   SpO2 95%   BMI 30.98 kg/m²   Physical Exam  Constitutional:       Appearance: Normal appearance.   HENT:      Head: Normocephalic and atraumatic.      Right Ear: Tympanic membrane, ear canal and external ear normal.      Left Ear: Tympanic membrane, ear canal and external ear normal.      Nose: No congestion or rhinorrhea.      Mouth/Throat:      Mouth: Mucous membranes are moist.      Pharynx: No oropharyngeal exudate or posterior oropharyngeal erythema.   Eyes:      General: No scleral icterus.        Right eye: No discharge.         Left eye: No discharge.      Extraocular Movements: Extraocular movements intact.      Conjunctiva/sclera: Conjunctivae normal.   Cardiovascular:      Rate and Rhythm: Normal rate and regular rhythm.      Pulses: Normal pulses.      Heart sounds: Normal heart sounds.   Pulmonary:      Effort: Pulmonary effort is normal.      Breath sounds: Normal breath sounds.   Abdominal:      General: Abdomen is flat. Bowel sounds are normal.      Palpations: Abdomen is soft.   Musculoskeletal:         General: No deformity. Normal range of motion.      Cervical back: Normal " range of motion and neck supple.   Skin:     General: Skin is warm and dry.   Neurological:      Mental Status: He is alert and oriented to person, place, and time.   Psychiatric:         Mood and Affect: Mood normal.         Behavior: Behavior normal.         Thought Content: Thought content normal.         Judgment: Judgment normal.            No data to display                  5/14/2024     3:30 PM 4/9/2024     2:00 PM 10/3/2022    10:00 AM   Fall Risk Assessment - Outpatient   Mobility Status Ambulatory Ambulatory Ambulatory   Number of falls 0 0 0   Identified as fall risk False False False           Depression Screening  Over the past two weeks, has the patient felt down, depressed, or hopeless?: No  Over the past two weeks, has the patient felt little interest or pleasure in doing things?: No  Functional Ability/Safety Screening  Was the patient's timed Up & Go test unsteady or longer than 30 seconds?: No  Does the patient need help with phone, transportation, shopping, preparing meals, housework, laundry, meds, or managing money?: No  Does the patient's home have rugs in the hallway, lack grab bars in the bathroom, lack handrails on the stairs or have poor lighting?: No  Have you noticed any hearing difficulties?: No  Cognitive Function (Assessed through direct observation with due consideration of information obtained by way of patient reports and/or concerns raised by family, friends, caretakers, or others)    Does the patient repeat questions/statements in the same day?: No  Does the patient have trouble remembering the date, year, and time?: No  Does the patient have difficulty managing finances?: No  Does the patient have a decreased sense of direction?: No  Assessment:       ICD-10-CM ICD-9-CM   1. Wellness examination  Z00.00 V70.0   2. Elevated PSA  R97.20 790.93   3. Normocytic anemia  D64.9 285.9   4. Prediabetes  R73.03 790.29   5. Colon cancer screening  Z12.11 V76.51   6. Hx of CABG  Z95.1  V45.81   7. Gastroesophageal reflux disease, unspecified whether esophagitis present  K21.9 530.81   8. Primary hypertension  I10 401.9   9. Abnormal finding of blood chemistry, unspecified  R79.9 790.6   10. Hyperlipidemia, unspecified hyperlipidemia type  E78.5 272.4      Plan:   1. Wellness examination  Assessment & Plan:  Provided Gustavo Cheung with a 5-10 year written screening schedule and personal prevention plan. Recommendations were developed using the USPSTF age appropriate recommendations. Education, counseling, and referrals were provided as needed. After Visit Summary printed and given to patient which includes a list of additional screenings\tests needed.         2. Elevated PSA  Assessment & Plan:  Refer to urology for eval    Orders:  -     Ambulatory referral/consult to Urology; Future; Expected date: 05/21/2024    3. Normocytic anemia  Assessment & Plan:  Further anemia studies.   Nutrient rich diet (I.e., red meat, legumes, green/leafy vegetables, etc.)  Consider starting po supplement  Consider referral for endoscopy      Orders:  -     CBC Auto Differential; Future; Expected date: 05/14/2024    4. Prediabetes  Assessment & Plan:  Prediabetic if A1c 5.7-6.4%, fasting glucose 100-125 mg/dL  Recommend 7% weight loss and exercise 150 minutes per week, decreased caloric intake (consider Mediterranean, DASH, vegetarian diet)  Follow ADA Diet. Avoid soda, simple sweets, and limit rice/pasta/breads/starches (no more than 45-50 grams per meal).  Maintain healthy weight with goal BMI <30.  Exercise at least 5 times per week for 30 minutes per day.  Associated with improved insulin sensitivity: Dietary fiber, coffee, cinnamon            5. Colon cancer screening  -     Ambulatory referral/consult to Gastroenterology; Future; Expected date: 05/21/2024    6. Hx of CABG  Overview:  Status post CABG 2022.    Assessment & Plan:  Stable.  Continue management per Cardiology with aspirin, Plavix, Zetia,  Crestor    Orders:  -     aspirin (ECOTRIN) 81 MG EC tablet; Take 1 tablet (81 mg total) by mouth once daily.  Dispense: 90 tablet; Refill: 3  -     Lipid Panel; Future; Expected date: 05/14/2024    7. Gastroesophageal reflux disease, unspecified whether esophagitis present  Assessment & Plan:  Stable  Continue Pepcid 20mg bid      8. Primary hypertension  Assessment & Plan:  Stable  Continue current medication regimen:  Lisinopril 10 mg daily, metoprolol 50 mg daily  Blood pressure at goal <140/90 (<130/80 if otherwise noted)  Recommend DASH diet  Avoid tobacco use  Record BP at home daily and bring log to next office visit, assure that home cuff is calibrated at minimum every 12 months      Orders:  -     Comprehensive Metabolic Panel; Future; Expected date: 05/14/2024    9. Abnormal finding of blood chemistry, unspecified  -     Lipid Panel; Future; Expected date: 05/14/2024    10. Hyperlipidemia, unspecified hyperlipidemia type  Assessment & Plan:  Stable  Continue rosuvastatin 20 mg daily, Zetia 10 mg daily           Medicare Annual Wellness and Personalized Prevention Plan:   Fall Risk + Home Safety + Hearing Impairment + Depression Screen + Opioid and Substance Abuse Screening + Cognitive Impairment Screen + Health Risk Assessment all reviewed.     Health Maintenance Topics with due status: Not Due       Topic Last Completion Date    TETANUS VACCINE 09/21/2016    Influenza Vaccine 10/10/2022    Aspirin/Antiplatelet Therapy 04/09/2024    Hemoglobin A1c (Prediabetes) 05/07/2024    Lipid Panel 05/07/2024      The patient's Health Maintenance was reviewed and the following appears to be due at this time:   Health Maintenance Due   Topic Date Due    Colorectal Cancer Screening  Never done    COVID-19 Vaccine (5 - 2023-24 season) 09/01/2023       Advance Care Planning     Date: 05/09/2024    Living Will  During this visit, I engaged the patient  in the voluntary advance care planning process.  The patient and I  reviewed the role for advance directives and their purpose in directing future healthcare if the patient's unable to speak for him/herself.  At this point in time, the patient does have full decision-making capacity.  We discussed different extreme health states that he could experience, and reviewed what kind of medical care he would want in those situations. Plans to discuss with sister         A separate E/M code has been provided to evaluate additional concerns addressed during the dedicated Wellness Exam.    Follow up in about 6 months (around 11/14/2024) for Chronic Conditions, With Labs; pls obtain screenings from cardiology [place], Pls give copy of [place]. In addition to their scheduled follow up, the patient has also been instructed to follow up on as needed basis.

## 2024-05-09 NOTE — ASSESSMENT & PLAN NOTE
Provided Gustavo Cheung with a 5-10 year written screening schedule and personal prevention plan. Recommendations were developed using the USPSTF age appropriate recommendations. Education, counseling, and referrals were provided as needed. After Visit Summary printed and given to patient which includes a list of additional screenings\tests needed.

## 2024-05-14 ENCOUNTER — OFFICE VISIT (OUTPATIENT)
Dept: PRIMARY CARE CLINIC | Facility: CLINIC | Age: 69
End: 2024-05-14
Payer: MEDICARE

## 2024-05-14 VITALS
TEMPERATURE: 98 F | DIASTOLIC BLOOD PRESSURE: 75 MMHG | BODY MASS INDEX: 30.81 KG/M2 | WEIGHT: 180.5 LBS | HEART RATE: 70 BPM | OXYGEN SATURATION: 95 % | SYSTOLIC BLOOD PRESSURE: 120 MMHG | HEIGHT: 64 IN

## 2024-05-14 DIAGNOSIS — R79.9 ABNORMAL FINDING OF BLOOD CHEMISTRY, UNSPECIFIED: ICD-10-CM

## 2024-05-14 DIAGNOSIS — E78.5 HYPERLIPIDEMIA, UNSPECIFIED HYPERLIPIDEMIA TYPE: ICD-10-CM

## 2024-05-14 DIAGNOSIS — D64.9 NORMOCYTIC ANEMIA: ICD-10-CM

## 2024-05-14 DIAGNOSIS — Z95.1 HX OF CABG: ICD-10-CM

## 2024-05-14 DIAGNOSIS — K21.9 GASTROESOPHAGEAL REFLUX DISEASE, UNSPECIFIED WHETHER ESOPHAGITIS PRESENT: ICD-10-CM

## 2024-05-14 DIAGNOSIS — Z12.11 COLON CANCER SCREENING: ICD-10-CM

## 2024-05-14 DIAGNOSIS — R73.03 PREDIABETES: ICD-10-CM

## 2024-05-14 DIAGNOSIS — Z00.00 WELLNESS EXAMINATION: Primary | ICD-10-CM

## 2024-05-14 DIAGNOSIS — R97.20 ELEVATED PSA: ICD-10-CM

## 2024-05-14 DIAGNOSIS — I10 PRIMARY HYPERTENSION: ICD-10-CM

## 2024-05-14 PROCEDURE — G0439 PPPS, SUBSEQ VISIT: HCPCS | Mod: ,,, | Performed by: STUDENT IN AN ORGANIZED HEALTH CARE EDUCATION/TRAINING PROGRAM

## 2024-05-14 RX ORDER — ASPIRIN 81 MG/1
81 TABLET ORAL DAILY
Qty: 90 TABLET | Refills: 3 | Status: SHIPPED | OUTPATIENT
Start: 2024-05-14

## 2024-05-14 NOTE — ASSESSMENT & PLAN NOTE
Prediabetic if A1c 5.7-6.4%, fasting glucose 100-125 mg/dL  Recommend 7% weight loss and exercise 150 minutes per week, decreased caloric intake (consider Mediterranean, DASH, vegetarian diet)  Follow ADA Diet. Avoid soda, simple sweets, and limit rice/pasta/breads/starches (no more than 45-50 grams per meal).  Maintain healthy weight with goal BMI <30.  Exercise at least 5 times per week for 30 minutes per day.  Associated with improved insulin sensitivity: Dietary fiber, coffee, cinnamon

## 2024-05-14 NOTE — ASSESSMENT & PLAN NOTE
Stable  Continue current medication regimen:  Lisinopril 10 mg daily, metoprolol 50 mg daily  Blood pressure at goal <140/90 (<130/80 if otherwise noted)  Recommend DASH diet  Avoid tobacco use  Record BP at home daily and bring log to next office visit, assure that home cuff is calibrated at minimum every 12 months

## 2024-06-06 ENCOUNTER — TELEPHONE (OUTPATIENT)
Dept: PRIMARY CARE CLINIC | Facility: CLINIC | Age: 69
End: 2024-06-06
Payer: MEDICARE

## 2024-06-06 NOTE — TELEPHONE ENCOUNTER
----- Message from Glenn Palm sent at 6/6/2024  1:36 PM CDT -----  .Type:  Patient Returning Call    Who Called:[facility]   Who Left Message for Patient:  Does the patient know what this is regarding?:staff states that they have tried to gewt in contact with the pt and have been unsuccessful   Would the patient rather a call back or a response via [facility]? Call back   Best Call Back Number:[phone]  Additional Information:

## 2024-08-12 PROBLEM — Z00.00 WELLNESS EXAMINATION: Status: RESOLVED | Noted: 2024-04-03 | Resolved: 2024-08-12

## 2024-08-30 RX ORDER — LISINOPRIL 10 MG/1
TABLET ORAL
Qty: 90 TABLET | Refills: 3 | Status: SHIPPED | OUTPATIENT
Start: 2024-08-30

## 2024-10-28 RX ORDER — FAMOTIDINE 20 MG/1
20 TABLET, FILM COATED ORAL 2 TIMES DAILY
Qty: 60 TABLET | Refills: 11 | Status: SHIPPED | OUTPATIENT
Start: 2024-10-28

## 2024-11-18 NOTE — PROGRESS NOTES
Date: 11/21/2024  Patient ID: [id number]   Chief Complaint: Follow-up (Discuss medication plavix, refill lisinopril, discuss back pain)    HPI:   Gustavo Cheung is a 69 y.o. male here today for Follow-up (Discuss medication plavix, refill lisinopril, discuss back pain)  Pt no longer taking Plavix since procedure was on 2022. Does f/u with cardiology. Needs refill on Lisinopril. Works in yard regularly and walks a lot, but he has low back pain that radiates BL behind legs to knees, slightly improved with stopping activity and pushing cart. Sciatic exercises doesn't help as much anymore.     Patient Active Problem List   Diagnosis    Hx of CABG    GERD (gastroesophageal reflux disease)    Hyperlipidemia    Hypertension    Class 1 obesity with body mass index (BMI) of 30.0 to 30.9 in adult    Elevated PSA    Normocytic anemia    Prediabetes    Chronic bilateral low back pain with bilateral sciatica     Outpatient Medications Marked as Taking for the 11/21/24 encounter (Office Visit) with Daja Baron MD   Medication Sig Dispense Refill    aspirin (ECOTRIN) 81 MG EC tablet Take 1 tablet (81 mg total) by mouth once daily. 90 tablet 3    ezetimibe (ZETIA) 10 mg tablet Take 10 mg by mouth.      famotidine (PEPCID) 20 MG tablet TAKE 1 TABLET(20 MG) BY MOUTH TWICE DAILY 60 tablet 11    metoprolol succinate (TOPROL-XL) 50 MG 24 hr tablet TAKE 1 TABLET(50 MG) BY MOUTH EVERY DAY 90 tablet 1    rosuvastatin (CRESTOR) 20 MG tablet Take 20 mg by mouth.      [DISCONTINUED] lisinopriL 10 MG tablet TAKE 1 TABLET(10 MG) BY MOUTH EVERY DAY 90 tablet 3     Past Medical History:   Diagnosis Date    Calcaneus fracture, right 2010    without immediate repair    COVID 08/2022    GERD (gastroesophageal reflux disease)     Hyperlipidemia     Hypertension     Myocardial infarction 08/2022    Stable angina pectoris 09/02/2022    Valvular heart disease      Past Surgical History:   Procedure Laterality Date    CORONARY ARTERY BYPASS GRAFT  (CABG) N/A 2022    Procedure: CORONARY ARTERY BYPASS GRAFT (CABG);  Surgeon: Adolfo Coleman MD;  Location: [facility];  Service: Cardiothoracic;  Laterality: N/A;    HERNIA REPAIR Right     LEFT HEART CATHETERIZATION Left 2022    Procedure: Left heart cath;  Surgeon: Luis Daniel Maza MD;  Location: [facility] CATH LAB;  Service: Cardiology;  Laterality: Left;    PERCUTANEOUS TRANSLUMINAL BALLOON ANGIOPLASTY OF CORONARY ARTERY  2022    Procedure: Angioplasty-coronary;  Surgeon: Luis Daniel Maza MD;  Location: [facility] CATH LAB;  Service: Cardiology;;     Review of patient's allergies indicates:  No Known Allergies  Family History   Problem Relation Name Age of Onset    Emphysema Father      COPD Father      Cancer Other          mother's side    Diabetes Other          father's side      Social History     Socioeconomic History    Marital status:     Number of children: 0   Occupational History    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   Tobacco Use    Smoking status: Former     Current packs/day: 0.00     Types: Cigarettes     Quit date: 2016     Years since quittin.3    Smokeless tobacco: Never    Tobacco comments:     Started at 34yrs, 1ppd, Quit    Substance and Sexual Activity    Alcohol use: Not Currently    Drug use: Never    Sexual activity: Not Currently     Birth control/protection: Abstinence     Social Drivers of Health     Financial Resource Strain: Medium Risk (2024)    Overall Financial Resource Strain (CARDIA)     Difficulty of Paying Living Expenses: Somewhat hard   Food Insecurity: No Food Insecurity (2024)    Hunger Vital Sign     Worried About Running Out of Food in the Last Year: Never true     Ran Out of Food in the Last Year: Never true   Transportation Needs: No Transportation Needs (2024)    TRANSPORTATION NEEDS     Transportation : No   Physical Activity: Sufficiently Active (2024)    Exercise Vital Sign     " Days of Exercise per Week: 6 days     Minutes of Exercise per Session: 130 min   Stress: No Stress Concern Present (11/20/2024)    [employer] [place] [employer] - Occupational Stress Questionnaire     Feeling of Stress : Only a little   Housing Stability: Low Risk  (11/20/2024)    Housing Stability Vital Sign     Unable to Pay for Housing in the Last Year: No     Homeless in the Last Year: No     Patient Care Team:  Daja Baron MD as PCP - General (Family Medicine)  Ross Gutierrez PA-C as Physician Assistant (Physician Assistant)  Dennys Merida FNP as Nurse Practitioner (Cardiology)   Subjective:   ROS  See HPI for details  All Other ROS: Negative except as stated in HPI.   Objective:   /78   Pulse 76   Ht 5' 4" (1.626 m)   Wt 81.3 kg (179 lb 4.8 oz)   SpO2 96%   BMI 30.78 kg/m²   Physical Exam  Constitutional:       General: He is not in acute distress.  Musculoskeletal:      Comments: BL leg raise negative and lumbar spine/paraspinal muscles NTTP   Neurological:      Mental Status: He is alert.       Assessment:       ICD-10-CM ICD-9-CM   1. Primary hypertension  I10 401.9   2. Chronic bilateral low back pain with bilateral sciatica  M54.42 724.2    M54.41 724.3    G89.29 338.29        Plan:   1. Primary hypertension  Assessment & Plan:  Stable  Continue current medication regimen:  Lisinopril 10 mg daily, metoprolol 50 mg daily  Blood pressure at goal <140/90 (<130/80 if otherwise noted)  Recommend DASH diet  Avoid tobacco use  Record BP at home daily and bring log to next office visit, assure that home cuff is calibrated at minimum every 12 months      Orders:  -     lisinopriL 10 MG tablet; Take 1 tablet (10 mg total) by mouth once daily.  Dispense: 90 tablet; Refill: 3    2. Chronic bilateral low back pain with bilateral sciatica  Assessment & Plan:  Obtain XR  Wear supportive brace  Continue HEP, avoid triggers  Consider orthopedics referral/MRI    Orders:  -     X-Ray " Lumbar Complete Including Flex And Ext; Future; Expected date: 11/21/2024           Medication List with Changes/Refills   Current Medications    ASPIRIN (ECOTRIN) 81 MG EC TABLET    Take 1 tablet (81 mg total) by mouth once daily.       Start Date: 5/14/2024 End Date: --    ATORVASTATIN (LIPITOR) 40 MG TABLET    TAKE 1 TABLET(40 MG) BY MOUTH EVERY EVENING       Start Date: 11/10/2023End Date: --    CLOPIDOGREL (PLAVIX) 75 MG TABLET    TAKE 1 TABLET(75 MG) BY MOUTH EVERY DAY       Start Date: 3/5/2024  End Date: --    EZETIMIBE (ZETIA) 10 MG TABLET    Take 10 mg by mouth.       Start Date: 2/12/2024 End Date: --    FAMOTIDINE (PEPCID) 20 MG TABLET    TAKE 1 TABLET(20 MG) BY MOUTH TWICE DAILY       Start Date: 10/28/2024End Date: --    FOLIC ACID (FOLVITE) 1 MG TABLET    Take 1 tablet (1 mg total) by mouth once daily.       Start Date: 9/10/2022 End Date: 9/10/2023    HYDROCODONE-ACETAMINOPHEN (NORCO) 5-325 MG PER TABLET    Take 1 tablet by mouth every 6 (six) hours as needed for Pain.       Start Date: 9/10/2022 End Date: --    METOPROLOL SUCCINATE (TOPROL-XL) 50 MG 24 HR TABLET    TAKE 1 TABLET(50 MG) BY MOUTH EVERY DAY       Start Date: 4/11/2024 End Date: --    METOPROLOL SUCCINATE (TOPROL-XL) 50 MG 24 HR TABLET    TAKE 1 TABLET(50 MG) BY MOUTH EVERY DAY       Start Date: 4/2/2024  End Date: --    ROSUVASTATIN (CRESTOR) 20 MG TABLET    Take 20 mg by mouth.       Start Date: 2/20/2024 End Date: --   Changed and/or Refilled Medications    Modified Medication Previous Medication    LISINOPRIL 10 MG TABLET lisinopriL 10 MG tablet       Take 1 tablet (10 mg total) by mouth once daily.    TAKE 1 TABLET(10 MG) BY MOUTH EVERY DAY       Start Date: 11/21/2024End Date: --    Start Date: 8/30/2024 End Date: 11/21/2024        Follow up in about 3 weeks (around 12/12/2024) for low back pain, XR results. In addition to their scheduled follow up, the patient has also been instructed to follow up on as needed basis.

## 2024-11-21 ENCOUNTER — OFFICE VISIT (OUTPATIENT)
Dept: PRIMARY CARE CLINIC | Facility: CLINIC | Age: 69
End: 2024-11-21
Payer: MEDICARE

## 2024-11-21 VITALS
SYSTOLIC BLOOD PRESSURE: 136 MMHG | BODY MASS INDEX: 30.61 KG/M2 | HEIGHT: 64 IN | HEART RATE: 76 BPM | OXYGEN SATURATION: 96 % | DIASTOLIC BLOOD PRESSURE: 78 MMHG | WEIGHT: 179.31 LBS

## 2024-11-21 DIAGNOSIS — I10 PRIMARY HYPERTENSION: Primary | ICD-10-CM

## 2024-11-21 DIAGNOSIS — G89.29 CHRONIC BILATERAL LOW BACK PAIN WITH BILATERAL SCIATICA: ICD-10-CM

## 2024-11-21 DIAGNOSIS — M54.41 CHRONIC BILATERAL LOW BACK PAIN WITH BILATERAL SCIATICA: ICD-10-CM

## 2024-11-21 DIAGNOSIS — M54.42 CHRONIC BILATERAL LOW BACK PAIN WITH BILATERAL SCIATICA: ICD-10-CM

## 2024-11-21 PROCEDURE — 99214 OFFICE O/P EST MOD 30 MIN: CPT | Mod: ,,, | Performed by: STUDENT IN AN ORGANIZED HEALTH CARE EDUCATION/TRAINING PROGRAM

## 2024-11-21 RX ORDER — LISINOPRIL 10 MG/1
10 TABLET ORAL DAILY
Qty: 90 TABLET | Refills: 3 | Status: SHIPPED | OUTPATIENT
Start: 2024-11-21

## 2024-11-27 ENCOUNTER — HOSPITAL ENCOUNTER (OUTPATIENT)
Dept: RADIOLOGY | Facility: HOSPITAL | Age: 69
Discharge: HOME | End: 2024-11-27
Attending: STUDENT IN AN ORGANIZED HEALTH CARE EDUCATION/TRAINING PROGRAM
Payer: MEDICARE

## 2024-11-27 DIAGNOSIS — M54.42 CHRONIC BILATERAL LOW BACK PAIN WITH BILATERAL SCIATICA: ICD-10-CM

## 2024-11-27 DIAGNOSIS — M54.41 CHRONIC BILATERAL LOW BACK PAIN WITH BILATERAL SCIATICA: ICD-10-CM

## 2024-11-27 DIAGNOSIS — G89.29 CHRONIC BILATERAL LOW BACK PAIN WITH BILATERAL SCIATICA: ICD-10-CM

## 2024-11-27 PROCEDURE — 72114 X-RAY EXAM L-S SPINE BENDING: CPT | Mod: TC

## 2024-12-12 ENCOUNTER — OFFICE VISIT (OUTPATIENT)
Dept: PRIMARY CARE CLINIC | Facility: CLINIC | Age: 69
End: 2024-12-12
Payer: MEDICARE

## 2024-12-12 VITALS
BODY MASS INDEX: 30.16 KG/M2 | SYSTOLIC BLOOD PRESSURE: 120 MMHG | WEIGHT: 176.63 LBS | OXYGEN SATURATION: 95 % | HEART RATE: 70 BPM | DIASTOLIC BLOOD PRESSURE: 77 MMHG | HEIGHT: 64 IN

## 2024-12-12 DIAGNOSIS — M85.88 OSTEOPENIA OF LUMBAR SPINE: ICD-10-CM

## 2024-12-12 DIAGNOSIS — G89.29 CHRONIC BILATERAL LOW BACK PAIN WITHOUT SCIATICA: ICD-10-CM

## 2024-12-12 DIAGNOSIS — M54.42 CHRONIC BILATERAL LOW BACK PAIN WITH BILATERAL SCIATICA: ICD-10-CM

## 2024-12-12 DIAGNOSIS — M54.41 CHRONIC BILATERAL LOW BACK PAIN WITH BILATERAL SCIATICA: ICD-10-CM

## 2024-12-12 DIAGNOSIS — M85.80 OSTEOPENIA, UNSPECIFIED LOCATION: Primary | ICD-10-CM

## 2024-12-12 DIAGNOSIS — G89.29 CHRONIC BILATERAL LOW BACK PAIN WITH BILATERAL SCIATICA: ICD-10-CM

## 2024-12-12 DIAGNOSIS — M54.50 CHRONIC BILATERAL LOW BACK PAIN WITHOUT SCIATICA: ICD-10-CM

## 2024-12-12 NOTE — ASSESSMENT & PLAN NOTE
Referral to PT today.   Wear supportive brace  Continue HEP, avoid triggers  Consider orthopedics referral/MRI

## 2024-12-12 NOTE — PROGRESS NOTES
Family Medicine    Patient ID: [id number]     Chief Complaint: Follow-up (Follow up for xrays of back)      HPI:     Gustavo Cheung is a 69 y.o. male here today for a follow up lower back pain with radiation to the BLE, better with leaning forward, worse with upright position.  Reviewed X ray with the patient today.     Past Medical History:   Diagnosis Date    Calcaneus fracture, right     without immediate repair    COVID 2022    GERD (gastroesophageal reflux disease)     Hyperlipidemia     Hypertension     Myocardial infarction 2022    Stable angina pectoris 2022    Valvular heart disease         Past Surgical History:   Procedure Laterality Date    CORONARY ARTERY BYPASS GRAFT (CABG) N/A 2022    Procedure: CORONARY ARTERY BYPASS GRAFT (CABG);  Surgeon: Adolfo Coleman MD;  Location: [facility] OR;  Service: Cardiothoracic;  Laterality: N/A;    HERNIA REPAIR Right     LEFT HEART CATHETERIZATION Left 2022    Procedure: Left heart cath;  Surgeon: Luis Daniel Maza MD;  Location: [facility] CATH LAB;  Service: Cardiology;  Laterality: Left;    PERCUTANEOUS TRANSLUMINAL BALLOON ANGIOPLASTY OF CORONARY ARTERY  2022    Procedure: Angioplasty-coronary;  Surgeon: Luis Daniel Maza MD;  Location: [facility] CATH LAB;  Service: Cardiology;;        Social History     Tobacco Use    Smoking status: Former     Current packs/day: 0.00     Types: Cigarettes     Quit date: 2016     Years since quittin.3    Smokeless tobacco: Never    Tobacco comments:     Started at 34yrs, 1ppd, Quit    Substance and Sexual Activity    Alcohol use: Not Currently    Drug use: Never    Sexual activity: Not Currently     Birth control/protection: Abstinence        Current Outpatient Medications   Medication Instructions    aspirin (ECOTRIN) 81 mg, Oral, Daily    atorvastatin (LIPITOR) 40 mg, Oral, Nightly    clopidogreL (PLAVIX) 75 mg, Oral    ezetimibe (ZETIA) 10 mg    famotidine (PEPCID) 20 mg, Oral, 2 times daily  "   folic acid (FOLVITE) 1 mg, Oral, Daily    HYDROcodone-acetaminophen (NORCO) 5-325 mg per tablet 1 tablet, Oral, Every 6 hours PRN    lisinopriL 10 mg, Oral, Daily    metoprolol succinate (TOPROL-XL) 50 mg, Oral    metoprolol succinate (TOPROL-XL) 50 mg, Oral    rosuvastatin (CRESTOR) 20 mg       Review of patient's allergies indicates:  No Known Allergies     Patient Care Team:  Daja Baron MD as PCP - General (Family Medicine)  Ross Gutierrez PA-C as Physician Assistant (Physician Assistant)  Dennys Merida FNP as Nurse Practitioner (Cardiology)     Subjective:     Review of Systems    12 point review of systems conducted, negative except as stated in the history of present illness. See HPI for details.    Objective:     Visit Vitals  /77   Pulse 70   Ht 5' 4" (1.626 m)   Wt 80.1 kg (176 lb 9.6 oz)   SpO2 95%   BMI 30.31 kg/m²       Physical Exam  Vitals and nursing note reviewed.   Constitutional:       Appearance: He is not ill-appearing.   HENT:      Head: Normocephalic.      Mouth/Throat:      Mouth: Mucous membranes are moist.   Cardiovascular:      Rate and Rhythm: Normal rate.   Pulmonary:      Effort: Pulmonary effort is normal.   Neurological:      General: No focal deficit present.      Mental Status: He is alert.   Psychiatric:         Mood and Affect: Mood normal.         Labs Reviewed:     Chemistry:  Lab Results   Component Value Date     05/07/2024    K 4.1 05/07/2024    BUN 15.0 05/07/2024    CREATININE 0.85 05/07/2024    EGFRNORACEVR >60 05/07/2024    GLUCOSE 95 05/07/2024    CALCIUM 8.9 05/07/2024    ALKPHOS 51 05/07/2024    LABPROT 6.9 05/07/2024    ALBUMIN 4.1 05/07/2024    AST 23 05/07/2024    ALT 25 05/07/2024    MG 2.10 09/09/2022    PHOS 3.8 08/31/2022    TSH 1.525 05/07/2024    FDQBFH2OPKN 0.91 05/07/2024    PSA 4.09 (H) 05/07/2024        Lab Results   Component Value Date    HGBA1C 6.0 05/07/2024        Hematology:  Lab Results   Component Value Date    WBC 6.45 " 05/07/2024    HGB 12.3 (L) 05/07/2024    HCT 37.9 (L) 05/07/2024     05/07/2024       Lipid Panel:  Lab Results   Component Value Date    CHOL 138 05/07/2024    HDL 56 05/07/2024    LDL 68.00 05/07/2024    TRIG 72 05/07/2024    TOTALCHOLEST 2 05/07/2024        Urine:  Lab Results   Component Value Date    APPEARANCEUA Clear 09/04/2022    SGUA 1.011 09/04/2022    PROTEINUA Negative 09/04/2022    KETONESUA Negative 09/04/2022    LEUKOCYTESUR Negative 09/04/2022    RBCUA <5 09/04/2022    WBCUA <5 09/04/2022    BACTERIA None Seen 09/04/2022        Assessment:       ICD-10-CM ICD-9-CM   1. Osteopenia, unspecified location  M85.80 733.90   2. Chronic bilateral low back pain without sciatica  M54.50 724.2    G89.29 338.29   3. Osteopenia of lumbar spine  M85.88 733.90   4. Chronic bilateral low back pain with bilateral sciatica  M54.42 724.2    M54.41 724.3    G89.29 338.29        Plan:     1. Osteopenia, unspecified location  -     DXA Bone Density Axial Skeleton 1 or more sites; Future; Expected date: 12/12/2024    2. Chronic bilateral low back pain without sciatica  -     Ambulatory referral/consult to Physical/Occupational Therapy; Future; Expected date: 12/19/2024    3. Osteopenia of lumbar spine  Overview:  DEXA to evaluate extend of osteopenia.  Start vitamin D and Calcium supplementation.     Orders:  -     DXA Bone Density Axial Skeleton 1 or more sites; Future; Expected date: 12/12/2024    4. Chronic bilateral low back pain with bilateral sciatica  Assessment & Plan:  Referral to PT today.   Wear supportive brace  Continue HEP, avoid triggers  Consider orthopedics referral/MRI           No follow-ups on file. In addition to their scheduled follow up, the patient has also been instructed to follow up on as needed basis.     No future appointments.     Michelle Friend MD

## 2024-12-18 ENCOUNTER — HOSPITAL ENCOUNTER (OUTPATIENT)
Dept: RADIOLOGY | Facility: HOSPITAL | Age: 69
Discharge: HOME | End: 2024-12-18
Attending: FAMILY MEDICINE
Payer: MEDICARE

## 2024-12-18 DIAGNOSIS — M85.80 OSTEOPENIA, UNSPECIFIED LOCATION: ICD-10-CM

## 2024-12-18 DIAGNOSIS — M85.88 OSTEOPENIA OF LUMBAR SPINE: ICD-10-CM

## 2024-12-18 PROCEDURE — 77080 DXA BONE DENSITY AXIAL: CPT | Mod: TC

## 2024-12-19 DIAGNOSIS — M85.80 OSTEOPENIA, UNSPECIFIED LOCATION: Primary | ICD-10-CM

## 2025-01-29 ENCOUNTER — TELEPHONE (OUTPATIENT)
Dept: PRIMARY CARE CLINIC | Facility: CLINIC | Age: 70
End: 2025-01-29
Payer: MEDICARE

## 2025-01-29 NOTE — TELEPHONE ENCOUNTER
----- Message from Tech Heidy sent at 1/29/2025  2:15 PM CST -----  .Type:  Needs Medical Advice    Who Called:   Referral Team    Symptoms (please be specific):  no     How long has patient had these symptoms:   no    Pharmacy name and phone #:   no    Would the patient rather a call back or a response via [facility]?  Cb    Best Call Back Number:  [phone]    Additional Information:  please resend pt's referral to [facility] fax# [phone] thanks

## 2025-01-30 NOTE — ASSESSMENT & PLAN NOTE
[id number]   Obtain XR  Wear supportive brace  Continue HEP, avoid triggers  Consider orthopedics referral/MRI

## 2025-02-05 ENCOUNTER — TELEPHONE (OUTPATIENT)
Dept: PRIMARY CARE CLINIC | Facility: CLINIC | Age: 70
End: 2025-02-05
Payer: MEDICARE

## 2025-02-05 NOTE — TELEPHONE ENCOUNTER
----- Message from Tech Heidy sent at 2/5/2025 11:19 AM CST -----  .Type:  Needs Medical Advice    Who Called:  [facility]    Symptoms (please be specific):  no     How long has patient had these symptoms:   no    Pharmacy name and phone #:   no    Would the patient rather a call back or a response via [facility]?      Best Call Back Number:   [phone]    Additional Information:  pt's referral was sent to wrong fax# please refax pt's referral for Physical Therapy to     [facility]    ATTN:  Marva    Fax#  [phone]     This will get the referral directly to the scheduling department thank you

## 2025-04-16 LAB
ALBUMIN/GLOB SERPL: 2 {RATIO}
ALBUMIN: 4.5
ALP ISOS SERPL LEV INH-CCNC: 55 U/L
ALT SERPL-CCNC: 14 UNIT/L
ANION GAP SERPL CALC-SCNC: NORMAL MMOL/L
AST: 17
BILIRUB SERPL-MCNC: 0.3 MG/DL (ref 0.1–1.4)
BILIRUBIN DIRECT+TOT PNL SERPL-MCNC: 0.1 MG/DL (ref 0.01–0.4)
BUN SERPL-MCNC: 12 MG/DL
BUN SERPL-MCNC: NORMAL MG/DL
BUN/CREAT SERPL: NORMAL
CALCIUM SERPL-MCNC: 9.4 MG/DL
CHLORIDE SERPL-SCNC: 105 MMOL/L (ref 99–108)
CK-BB: 175
CO2 SERPL-SCNC: 24 MMOL/L
CO2 SERPL-SCNC: NORMAL MMOL/L
CREAT SERPL-MCNC: 1 MG/DL
EGFR: 81.3
GLOBULIN SER-MCNC: 2.2 G/DL
GLUCOSE SERPL-MCNC: 126 MG/DL
IRON: NORMAL
MAGNESIUM SERPL-MCNC: NORMAL MG/DL
PHOSPHATE FLD-MCNC: 3.7 MG/DL (ref 2.5–4.9)
POTASSIUM SERPL-SCNC: 4.3 MMOL/L (ref 3.4–5.3)
PROTEIN TOTAL: 6.7
SODIUM BLD-SCNC: 141 MMOL/L (ref 137–147)
URATE SERPL-MCNC: 5.8 MG/DL

## 2025-05-03 RX ORDER — METOPROLOL SUCCINATE 50 MG/1
50 TABLET, EXTENDED RELEASE ORAL
Qty: 90 TABLET | Refills: 1 | Status: SHIPPED | OUTPATIENT
Start: 2025-05-03

## 2025-06-04 LAB
ALBUMIN/GLOB SERPL: 1.8 {RATIO}
ALBUMIN: 4.4
ALP ISOS SERPL LEV INH-CCNC: 45 U/L
ALT SERPL-CCNC: 23 UNIT/L
ANION GAP SERPL CALC-SCNC: NORMAL MMOL/L
AST: 30
BILIRUB SERPL-MCNC: 0.5 MG/DL (ref 0.1–1.4)
BILIRUBIN DIRECT+TOT PNL SERPL-MCNC: NORMAL
BUN SERPL-MCNC: 15 MG/DL
BUN SERPL-MCNC: NORMAL MG/DL
BUN/CREAT SERPL: NORMAL
CALCIUM SERPL-MCNC: 9.5 MG/DL
CHLORIDE SERPL-SCNC: 107 MMOL/L (ref 99–108)
CHOL/HDLC RATIO: NORMAL
CHOL/HDLC RATIO: NORMAL
CHOLEST SERPL-MSCNC: 128 MG/DL (ref 0–200)
CHOLEST SERPL-MSCNC: 128 MG/DL (ref 0–200)
CHOLEST/HDLC SERPL: 2.3 {RATIO}
CHOLEST/HDLC SERPL: 2.3 {RATIO}
CK-BB: NORMAL
CO2 SERPL-SCNC: 27 MMOL/L
CO2 SERPL-SCNC: NORMAL MMOL/L
CREAT SERPL-MCNC: 1 MG/DL
EGFR: 73.9
GLOBULIN SER-MCNC: 2.4 G/DL
GLUCOSE SERPL-MCNC: 102 MG/DL
HBA1C MFR BLD: 5.9 % (ref 4–6)
HBA1C MFR BLD: 5.9 % (ref 4–6)
HDL/CHOLESTEROL RATIO: NORMAL
HDL/CHOLESTEROL RATIO: NORMAL
HDLC SERPL-MCNC: 55 MG/DL (ref 35–70)
HDLC SERPL-MCNC: 55 MG/DL (ref 35–70)
IRON: NORMAL
LDL CHOLESTEROL DIRECT: 61 MG/DL
LDL CHOLESTEROL DIRECT: 61 MG/DL
LDLC SERPL CALC-MCNC: NORMAL MG/DL
MAGNESIUM SERPL-MCNC: NORMAL MG/DL
NONHDLC SERPL-MCNC: 73 MG/DL
NONHDLC SERPL-MCNC: 73 MG/DL
PHOSPHATE FLD-MCNC: NORMAL MG/DL
POTASSIUM SERPL-SCNC: 4.1 MMOL/L (ref 3.4–5.3)
PROTEIN TOTAL: 6.8
SODIUM BLD-SCNC: 141 MMOL/L (ref 137–147)
TRIGL SERPL-MCNC: 70 MG/DL (ref 40–160)
TRIGL SERPL-MCNC: 70 MG/DL (ref 40–160)
URATE SERPL-MCNC: NORMAL MG/DL
VLDL CHOLESTEROL: 14 MG/DL
VLDL CHOLESTEROL: 14 MG/DL

## 2025-06-16 ENCOUNTER — PATIENT OUTREACH (OUTPATIENT)
Facility: CLINIC | Age: 70
End: 2025-06-16
Payer: MEDICARE

## 2025-06-16 ENCOUNTER — PATIENT OUTREACH (OUTPATIENT)
Dept: ADMINISTRATIVE | Facility: HOSPITAL | Age: 70
End: 2025-06-16
Payer: MEDICARE

## 2025-06-16 NOTE — PROGRESS NOTES
Health Maintenance Topic(s) Outreach Outcomes & Actions Taken:    Lab(s) - Outreach Outcomes & Actions Taken  : External Records Uploaded & Care Team Updated if Applicable     Additional Notes:  Lipid, A1C 6/4/25

## 2025-07-22 ENCOUNTER — PATIENT OUTREACH (OUTPATIENT)
Facility: CLINIC | Age: 70
End: 2025-07-22
Payer: MEDICARE

## 2025-07-22 DIAGNOSIS — Z12.11 COLON CANCER SCREENING: Primary | ICD-10-CM

## 2025-07-22 NOTE — PROGRESS NOTES
Population Health Outreach.    Updated Outside Immunizations.    Health Maintenance Topic(s) Outreach Outcomes & Actions Taken:  Colorectal Cancer Screening - Outreach Outcomes & Actions Taken  : :DUE  Referred to Dajuan Matthews 5/14/2024  Per [facility] 6/2024 scanned note,  [facility] did not take pt insurance for colonoscopy.   Portal Active, message sent.     Lab(s) - Outreach Outcomes & Actions Taken  : External Records Requested & Care Team Updated if Applicable  SHANEKA sent to pool for external lab claims:  A1c and Lipid Panel @ Dennys Merida FNP

## 2025-08-10 RX ORDER — METOPROLOL SUCCINATE 50 MG/1
50 TABLET, EXTENDED RELEASE ORAL
Qty: 90 TABLET | Refills: 1 | Status: SHIPPED | OUTPATIENT
Start: 2025-08-10

## (undated) DEVICE — CONTAINER SPECIMEN SCREW 4OZ

## (undated) DEVICE — DRESSING TELFA + RECT 6X10IN

## (undated) DEVICE — ADAPTER DLP Y PERF 3.5 &10IN

## (undated) DEVICE — KIT VAVD

## (undated) DEVICE — DRAPE SLUSH WARMER WITH DISC

## (undated) DEVICE — GUIDEWIRE SION BLUE STR 190CM

## (undated) DEVICE — GLOVE SURG BIOGEL LATEX SZ 7.5

## (undated) DEVICE — BAND TR COMP DEVICE REG 24CM

## (undated) DEVICE — CARTRIDGE SILV 4CHAN 2.0-3.5MG

## (undated) DEVICE — CARTRIDGE HEPARIN 2 CHNNL ACT

## (undated) DEVICE — DRESSING TELFA + BARR 4X6IN

## (undated) DEVICE — ADHESIVE DERMABOND ADVANCED

## (undated) DEVICE — BOWL STERILE LARGE 32OZ

## (undated) DEVICE — DRAIN CHEST DRY SUCTION

## (undated) DEVICE — Device

## (undated) DEVICE — SUT PROLENE BL 4-0 RB-1 36IN

## (undated) DEVICE — BANDAGE ACE NON LATEX 3IN

## (undated) DEVICE — GLOVE PROTEXIS BLUE LATEX 7

## (undated) DEVICE — SOL IRRI STRL WATER 1000ML

## (undated) DEVICE — PAD HEARTSTART DEFIB ADULT

## (undated) DEVICE — BAG MEDI-PLAST DECANTER C-FLOW

## (undated) DEVICE — KIT CATHGARD DBL LUMN 9FRX11.5

## (undated) DEVICE — KNIFE OPTIMUM STR GRN 15DEG

## (undated) DEVICE — GLOVE PROTEXIS LTX MICRO  7

## (undated) DEVICE — SUT VICRYL 1 OB 36 CTX

## (undated) DEVICE — APPLICATOR ENDOSCP 32CM5MM2TIP

## (undated) DEVICE — GUIDEWIRE OMNI STR TIP 185CM

## (undated) DEVICE — BANDAGE ACE DOUBLE STER 6IN

## (undated) DEVICE — SOL NACL IRR 3000ML

## (undated) DEVICE — HEMOSTAT SURGICEL NUKNIT 3X4IN

## (undated) DEVICE — SOL PLASMALYTE PH 7.4 1000ML

## (undated) DEVICE — PUNCH AORTIC ROT TIP 4.0MM

## (undated) DEVICE — KIT C.A.T.S. FAST START 4/CASE

## (undated) DEVICE — SUT PROLENE 7-0 BV175-6

## (undated) DEVICE — SEE MEDLINE ITEM 159606

## (undated) DEVICE — PACK VARISPREED BATTERY

## (undated) DEVICE — SUT ETHIBOND 0 CR/CTX 8-18

## (undated) DEVICE — STOPCOCK 4-WAY

## (undated) DEVICE — SUT 1 36IN PDS II VIO MONO

## (undated) DEVICE — GLOVE PROTEXIS HYDROGEL SZ6.5

## (undated) DEVICE — INSERT INTRACK CLAMP ULT 66MM

## (undated) DEVICE — CATH BLLN FG APEX MR 2.50X15MM

## (undated) DEVICE — GLOVE COTTON KNITTED CUFF

## (undated) DEVICE — BLOWER MISTER

## (undated) DEVICE — SUT MONOCRYL PLUS UD 3-0 27

## (undated) DEVICE — UNIT AUTOTRANSFUSION PL 450ML

## (undated) DEVICE — SYR 50CC LL

## (undated) DEVICE — SUT BONE WAX 2.5 GRMS 12/BX

## (undated) DEVICE — SYS VIRTUOSAPH PLUS EVM

## (undated) DEVICE — SUT VICRYL 2-0 36 CT-1

## (undated) DEVICE — KIT GLIDESHEATH SLEND 6FR 10CM

## (undated) DEVICE — SUT PROLENE RB-1 BL MONO

## (undated) DEVICE — CANNULA SOFT FLOW ANG 14IN 21F

## (undated) DEVICE — NDL HYPO REG 25G X 1 1/2

## (undated) DEVICE — MARKER SURG SM U SHAPE GRAFT

## (undated) DEVICE — ELECTRODE BLADE INSULATED 1 IN

## (undated) DEVICE — CATH ALL PUR URTHL 20FR

## (undated) DEVICE — GUIDEWIRE SION BLK STR 190CM

## (undated) DEVICE — SOL NORMAL USPCA 0.9%

## (undated) DEVICE — APPLICATOR SURG 2 SPRY 1 PLUNG

## (undated) DEVICE — CATH OPTITORQUE RADIAL 5FR

## (undated) DEVICE — CANNULA MC2 NVENT .5IN 28/36FR

## (undated) DEVICE — SOL LAC RINGERS 1000ML INJ

## (undated) DEVICE — SET ANGIO ACIST CVI ANGIOTOUCH

## (undated) DEVICE — KIT SURGICAL TURNOVER

## (undated) DEVICE — HOLDER STRIP-T SELF ADH 2X10IN

## (undated) DEVICE — SUT ETHBND XTRA 1 OS-8 30IN

## (undated) DEVICE — GAUZE VISTEC XR DTECT 16 4X4IN

## (undated) DEVICE — CARTRIDGE HEPARIN DOSE

## (undated) DEVICE — SUT PROLENE 6-0 C-1 30IN BL

## (undated) DEVICE — GUIDEWIRE STF .035X260CM STR

## (undated) DEVICE — SENSOR LOW LEVEL OXYGEN

## (undated) DEVICE — ELECTRODE UTAHLOOP EXT 10CM

## (undated) DEVICE — PAD DEFIB CADENCE ADULT R2

## (undated) DEVICE — GLOVE PROTEXIS LTX MICRO  7.5

## (undated) DEVICE — CANNULA ADULT NASAL 7FT

## (undated) DEVICE — TRAY CATH FOL SIL TEMP 10 16FR

## (undated) DEVICE — CANNULA AORT ROOT VNT LN 14GA

## (undated) DEVICE — NDL HYPODERMIC BLUNT 18G 1.5IN

## (undated) DEVICE — SEALANT VISTASEAL FIBRIN 10ML

## (undated) DEVICE — GLOVE BIOGEL 7.5

## (undated) DEVICE — SOL ELECTROLYTE PH 7.4 500ML

## (undated) DEVICE — SOL .9NACL PF 100 ML

## (undated) DEVICE — SET PERFUSION

## (undated) DEVICE — GUIDE LAUNCHER 6FR EBU 3.5